# Patient Record
Sex: FEMALE | Race: OTHER | NOT HISPANIC OR LATINO | URBAN - METROPOLITAN AREA
[De-identification: names, ages, dates, MRNs, and addresses within clinical notes are randomized per-mention and may not be internally consistent; named-entity substitution may affect disease eponyms.]

---

## 2017-03-29 ENCOUNTER — EMERGENCY (EMERGENCY)
Facility: HOSPITAL | Age: 77
LOS: 1 days | Discharge: HOME | End: 2017-03-29
Attending: EMERGENCY MEDICINE | Admitting: EMERGENCY MEDICINE
Payer: COMMERCIAL

## 2017-03-29 VITALS
SYSTOLIC BLOOD PRESSURE: 165 MMHG | TEMPERATURE: 99 F | OXYGEN SATURATION: 99 % | RESPIRATION RATE: 18 BRPM | DIASTOLIC BLOOD PRESSURE: 97 MMHG | HEART RATE: 80 BPM

## 2017-03-29 DIAGNOSIS — Z87.891 PERSONAL HISTORY OF NICOTINE DEPENDENCE: ICD-10-CM

## 2017-03-29 DIAGNOSIS — Z95.5 PRESENCE OF CORONARY ANGIOPLASTY IMPLANT AND GRAFT: ICD-10-CM

## 2017-03-29 DIAGNOSIS — K52.9 NONINFECTIVE GASTROENTERITIS AND COLITIS, UNSPECIFIED: ICD-10-CM

## 2017-03-29 DIAGNOSIS — E86.0 DEHYDRATION: ICD-10-CM

## 2017-03-29 DIAGNOSIS — R19.7 DIARRHEA, UNSPECIFIED: ICD-10-CM

## 2017-03-29 DIAGNOSIS — I10 ESSENTIAL (PRIMARY) HYPERTENSION: ICD-10-CM

## 2017-03-29 DIAGNOSIS — Z88.8 ALLERGY STATUS TO OTHER DRUGS, MEDICAMENTS AND BIOLOGICAL SUBSTANCES STATUS: ICD-10-CM

## 2017-03-29 LAB
ALBUMIN SERPL ELPH-MCNC: 4.9 G/DL — SIGNIFICANT CHANGE UP (ref 3.3–5)
ALP SERPL-CCNC: 59 U/L — SIGNIFICANT CHANGE UP (ref 40–120)
ALT FLD-CCNC: 36 U/L RC — SIGNIFICANT CHANGE UP (ref 10–45)
ANION GAP SERPL CALC-SCNC: 20 MMOL/L — HIGH (ref 5–17)
AST SERPL-CCNC: 66 U/L — HIGH (ref 10–40)
BASE EXCESS BLDV CALC-SCNC: -0.3 MMOL/L — SIGNIFICANT CHANGE UP (ref -2–2)
BASOPHILS # BLD AUTO: 0 K/UL — SIGNIFICANT CHANGE UP (ref 0–0.2)
BASOPHILS NFR BLD AUTO: 0.2 % — SIGNIFICANT CHANGE UP (ref 0–2)
BILIRUB SERPL-MCNC: 0.5 MG/DL — SIGNIFICANT CHANGE UP (ref 0.2–1.2)
BUN SERPL-MCNC: 20 MG/DL — SIGNIFICANT CHANGE UP (ref 7–23)
CA-I SERPL-SCNC: 1.32 MMOL/L — HIGH (ref 1.12–1.3)
CALCIUM SERPL-MCNC: 10.3 MG/DL — SIGNIFICANT CHANGE UP (ref 8.4–10.5)
CHLORIDE BLDV-SCNC: 109 MMOL/L — HIGH (ref 96–108)
CHLORIDE SERPL-SCNC: 104 MMOL/L — SIGNIFICANT CHANGE UP (ref 96–108)
CO2 BLDV-SCNC: 26 MMOL/L — SIGNIFICANT CHANGE UP (ref 22–30)
CO2 SERPL-SCNC: 22 MMOL/L — SIGNIFICANT CHANGE UP (ref 22–31)
CREAT SERPL-MCNC: 1.18 MG/DL — SIGNIFICANT CHANGE UP (ref 0.5–1.3)
EOSINOPHIL # BLD AUTO: 0 K/UL — SIGNIFICANT CHANGE UP (ref 0–0.5)
EOSINOPHIL NFR BLD AUTO: 0.1 % — SIGNIFICANT CHANGE UP (ref 0–6)
GAS PNL BLDV: 146 MMOL/L — HIGH (ref 136–145)
GAS PNL BLDV: SIGNIFICANT CHANGE UP
GLUCOSE BLDV-MCNC: 97 MG/DL — SIGNIFICANT CHANGE UP (ref 70–99)
GLUCOSE SERPL-MCNC: 93 MG/DL — SIGNIFICANT CHANGE UP (ref 70–99)
HCO3 BLDV-SCNC: 24 MMOL/L — SIGNIFICANT CHANGE UP (ref 21–29)
HCT VFR BLD CALC: 41.7 % — SIGNIFICANT CHANGE UP (ref 34.5–45)
HCT VFR BLDA CALC: 44 % — SIGNIFICANT CHANGE UP (ref 39–50)
HGB BLD CALC-MCNC: 14.2 G/DL — SIGNIFICANT CHANGE UP (ref 11.5–15.5)
HGB BLD-MCNC: 14.2 G/DL — SIGNIFICANT CHANGE UP (ref 11.5–15.5)
LACTATE BLDV-MCNC: 1.1 MMOL/L — SIGNIFICANT CHANGE UP (ref 0.7–2)
LIDOCAIN IGE QN: 40 U/L — SIGNIFICANT CHANGE UP (ref 7–60)
LYMPHOCYTES # BLD AUTO: 0.9 K/UL — LOW (ref 1–3.3)
LYMPHOCYTES # BLD AUTO: 13.1 % — SIGNIFICANT CHANGE UP (ref 13–44)
MAGNESIUM SERPL-MCNC: 2.3 MG/DL — SIGNIFICANT CHANGE UP (ref 1.6–2.6)
MCHC RBC-ENTMCNC: 31.5 PG — SIGNIFICANT CHANGE UP (ref 27–34)
MCHC RBC-ENTMCNC: 33.9 GM/DL — SIGNIFICANT CHANGE UP (ref 32–36)
MCV RBC AUTO: 92.8 FL — SIGNIFICANT CHANGE UP (ref 80–100)
MONOCYTES # BLD AUTO: 0.7 K/UL — SIGNIFICANT CHANGE UP (ref 0–0.9)
MONOCYTES NFR BLD AUTO: 9.1 % — SIGNIFICANT CHANGE UP (ref 2–14)
NEUTROPHILS # BLD AUTO: 5.6 K/UL — SIGNIFICANT CHANGE UP (ref 1.8–7.4)
NEUTROPHILS NFR BLD AUTO: 77.5 % — HIGH (ref 43–77)
PCO2 BLDV: 42 MMHG — SIGNIFICANT CHANGE UP (ref 35–50)
PH BLDV: 7.38 — SIGNIFICANT CHANGE UP (ref 7.35–7.45)
PHOSPHATE SERPL-MCNC: 3.2 MG/DL — SIGNIFICANT CHANGE UP (ref 2.5–4.5)
PLATELET # BLD AUTO: 141 K/UL — LOW (ref 150–400)
PO2 BLDV: 31 MMHG — SIGNIFICANT CHANGE UP (ref 25–45)
POTASSIUM BLDV-SCNC: 3.3 MMOL/L — LOW (ref 3.5–5)
POTASSIUM SERPL-MCNC: 3.5 MMOL/L — SIGNIFICANT CHANGE UP (ref 3.5–5.3)
POTASSIUM SERPL-SCNC: 3.5 MMOL/L — SIGNIFICANT CHANGE UP (ref 3.5–5.3)
PROT SERPL-MCNC: 8.1 G/DL — SIGNIFICANT CHANGE UP (ref 6–8.3)
RBC # BLD: 4.49 M/UL — SIGNIFICANT CHANGE UP (ref 3.8–5.2)
RBC # FLD: 12.1 % — SIGNIFICANT CHANGE UP (ref 10.3–14.5)
SAO2 % BLDV: 52 % — LOW (ref 67–88)
SODIUM SERPL-SCNC: 146 MMOL/L — HIGH (ref 135–145)
WBC # BLD: 7.2 K/UL — SIGNIFICANT CHANGE UP (ref 3.8–10.5)
WBC # FLD AUTO: 7.2 K/UL — SIGNIFICANT CHANGE UP (ref 3.8–10.5)

## 2017-03-29 PROCEDURE — 82947 ASSAY GLUCOSE BLOOD QUANT: CPT

## 2017-03-29 PROCEDURE — 82330 ASSAY OF CALCIUM: CPT

## 2017-03-29 PROCEDURE — 84100 ASSAY OF PHOSPHORUS: CPT

## 2017-03-29 PROCEDURE — 96374 THER/PROPH/DIAG INJ IV PUSH: CPT

## 2017-03-29 PROCEDURE — 82803 BLOOD GASES ANY COMBINATION: CPT

## 2017-03-29 PROCEDURE — 74176 CT ABD & PELVIS W/O CONTRAST: CPT

## 2017-03-29 PROCEDURE — 99285 EMERGENCY DEPT VISIT HI MDM: CPT

## 2017-03-29 PROCEDURE — 82435 ASSAY OF BLOOD CHLORIDE: CPT

## 2017-03-29 PROCEDURE — 84132 ASSAY OF SERUM POTASSIUM: CPT

## 2017-03-29 PROCEDURE — 85014 HEMATOCRIT: CPT

## 2017-03-29 PROCEDURE — 96375 TX/PRO/DX INJ NEW DRUG ADDON: CPT

## 2017-03-29 PROCEDURE — 83605 ASSAY OF LACTIC ACID: CPT

## 2017-03-29 PROCEDURE — 85027 COMPLETE CBC AUTOMATED: CPT

## 2017-03-29 PROCEDURE — 83735 ASSAY OF MAGNESIUM: CPT

## 2017-03-29 PROCEDURE — 84295 ASSAY OF SERUM SODIUM: CPT

## 2017-03-29 PROCEDURE — 83690 ASSAY OF LIPASE: CPT

## 2017-03-29 PROCEDURE — 81001 URINALYSIS AUTO W/SCOPE: CPT

## 2017-03-29 PROCEDURE — 99284 EMERGENCY DEPT VISIT MOD MDM: CPT | Mod: 25

## 2017-03-29 PROCEDURE — 80053 COMPREHEN METABOLIC PANEL: CPT

## 2017-03-29 RX ORDER — ACETAMINOPHEN 500 MG
1000 TABLET ORAL ONCE
Qty: 0 | Refills: 0 | Status: COMPLETED | OUTPATIENT
Start: 2017-03-29 | End: 2017-03-29

## 2017-03-29 RX ORDER — SODIUM CHLORIDE 9 MG/ML
2000 INJECTION, SOLUTION INTRAVENOUS ONCE
Qty: 0 | Refills: 0 | Status: COMPLETED | OUTPATIENT
Start: 2017-03-29 | End: 2017-03-29

## 2017-03-29 RX ORDER — ONDANSETRON 8 MG/1
8 TABLET, FILM COATED ORAL ONCE
Qty: 0 | Refills: 0 | Status: COMPLETED | OUTPATIENT
Start: 2017-03-29 | End: 2017-03-29

## 2017-03-29 RX ORDER — SODIUM CHLORIDE 9 MG/ML
1000 INJECTION, SOLUTION INTRAVENOUS
Qty: 0 | Refills: 0 | Status: DISCONTINUED | OUTPATIENT
Start: 2017-03-29 | End: 2017-04-02

## 2017-03-29 RX ADMIN — Medication 30 MILLILITER(S): at 23:18

## 2017-03-29 RX ADMIN — SODIUM CHLORIDE 2000 MILLILITER(S): 9 INJECTION, SOLUTION INTRAVENOUS at 23:18

## 2017-03-29 RX ADMIN — Medication 400 MILLIGRAM(S): at 23:19

## 2017-03-29 RX ADMIN — ONDANSETRON 8 MILLIGRAM(S): 8 TABLET, FILM COATED ORAL at 23:18

## 2017-03-29 NOTE — ED ADULT NURSE NOTE - OBJECTIVE STATEMENT
77 y/o female presenting to the ED 75 y/o female presenting to the ED for diffuse abdominal pain, vomiting and diarrhea x 10 hours; Patient denies blood in vomit and diarrhea, chills, fevers, SOB, chest pain; Patient states "feels nauseous"; Patient states may have consumed undercooked pork yesterday; Per patient "unable to keep solid food down but able to drink liquids"; mucous membranes moist and pink; Neuro grossly intact; a&ox3; safety and comfort measures provided

## 2017-03-29 NOTE — ED ADULT NURSE NOTE - PMH
Cataracts, bilateral    Coronary artery disease  s/p PCI with one stent to LAD  Depression    Dyslipidemia    Hypertension    Kidney stones    MRSA infection  nasal abscess  Myocardial infarct    Prediabetes

## 2017-03-29 NOTE — ED PROVIDER NOTE - OBJECTIVE STATEMENT
76 year old female with past medical history of Coronary Artery Disease s/p CABGx3 and stents x4,  Hyperlipidemia, Hypertension, preDM2, Asthma presents to the Emergency Department complaining of diffuse abdominal cramping, nausea, nonbilious/nonbloody vomiting (5x) and watery, nonbloody diarrhea (8x) since 08:00 today. Patient states she has not been able to tolerate PO and her medications. Notes eating questionable pork yesterday. Also notes left chest pressure.   Denies fever, chills, bloody bowel movements, shortness of breath, sick contacts, recent travel or any other complaints.

## 2017-03-29 NOTE — ED PROVIDER NOTE - PROGRESS NOTE DETAILS
The patient was re-examined after interventions and is feeling better. Discussed results with patient and family. Pending CT results. All questions and concerns addressed at bedside. Will continue to observe and monitor closely. Attd:  Patient signed out pending CT, already tolerating PO feeling better - CT A/P with findings consistent with diarrhea, no other acute findings noted.  Labs unremarkable.  Will discharge.  Socrates Gordon M.D.

## 2017-03-29 NOTE — ED PROVIDER NOTE - MEDICAL DECISION MAKING DETAILS
76 year old female presents with abdominal tenderness, vomiting and diarrhea since today. Plan: rule out intraabdominal pathology.

## 2017-03-29 NOTE — ED PROVIDER NOTE - NS ED MD SCRIBE ATTENDING SCRIBE SECTIONS
REVIEW OF SYSTEMS/VITAL SIGNS( Pullset)/PHYSICAL EXAM/DISPOSITION/HISTORY OF PRESENT ILLNESS/PAST MEDICAL/SURGICAL/SOCIAL HISTORY/HIV

## 2017-03-30 VITALS
SYSTOLIC BLOOD PRESSURE: 144 MMHG | TEMPERATURE: 98 F | RESPIRATION RATE: 16 BRPM | OXYGEN SATURATION: 97 % | DIASTOLIC BLOOD PRESSURE: 81 MMHG | HEART RATE: 74 BPM

## 2017-03-30 PROCEDURE — 74176 CT ABD & PELVIS W/O CONTRAST: CPT | Mod: 26

## 2017-03-30 RX ORDER — ONDANSETRON 8 MG/1
1 TABLET, FILM COATED ORAL
Qty: 20 | Refills: 0
Start: 2017-03-30

## 2017-03-30 RX ADMIN — Medication 1000 MILLIGRAM(S): at 00:41

## 2017-03-30 NOTE — ED ADULT NURSE REASSESSMENT NOTE - NS ED NURSE REASSESS COMMENT FT1
Patient states pain has been relieved by interventions; appears to be resting comfortably in stretcher; son at bedside; no acute distress at this time; awaiting CT results; safety and comfort measures maintained
0130 patient tolerated PO challenge; states pain has been relieved; appears to be resting comfortably in stretcher; a&ox3; safety and comfort measures maintained

## 2017-05-23 ENCOUNTER — TRANSCRIPTION ENCOUNTER (OUTPATIENT)
Age: 77
End: 2017-05-23

## 2017-05-23 ENCOUNTER — OUTPATIENT (OUTPATIENT)
Dept: OUTPATIENT SERVICES | Facility: HOSPITAL | Age: 77
LOS: 1 days | End: 2017-05-23
Payer: COMMERCIAL

## 2017-05-23 VITALS
HEIGHT: 64 IN | TEMPERATURE: 98 F | DIASTOLIC BLOOD PRESSURE: 87 MMHG | WEIGHT: 119.05 LBS | RESPIRATION RATE: 16 BRPM | SYSTOLIC BLOOD PRESSURE: 132 MMHG | HEART RATE: 58 BPM | OXYGEN SATURATION: 97 %

## 2017-05-23 VITALS
RESPIRATION RATE: 16 BRPM | HEART RATE: 58 BPM | OXYGEN SATURATION: 98 % | DIASTOLIC BLOOD PRESSURE: 66 MMHG | SYSTOLIC BLOOD PRESSURE: 104 MMHG

## 2017-05-23 DIAGNOSIS — H25.12 AGE-RELATED NUCLEAR CATARACT, LEFT EYE: ICD-10-CM

## 2017-05-23 PROCEDURE — C1780: CPT

## 2017-05-23 PROCEDURE — 66984 XCAPSL CTRC RMVL W/O ECP: CPT | Mod: LT

## 2018-08-01 ENCOUNTER — APPOINTMENT (OUTPATIENT)
Dept: OPHTHALMOLOGY | Facility: CLINIC | Age: 78
End: 2018-08-01

## 2018-08-01 ENCOUNTER — OUTPATIENT (OUTPATIENT)
Dept: OUTPATIENT SERVICES | Facility: HOSPITAL | Age: 78
LOS: 1 days | End: 2018-08-01

## 2018-08-02 DIAGNOSIS — H18.609 KERATOCONUS, UNSPECIFIED, UNSPECIFIED EYE: ICD-10-CM

## 2018-11-08 ENCOUNTER — APPOINTMENT (OUTPATIENT)
Dept: OPHTHALMOLOGY | Facility: CLINIC | Age: 78
End: 2018-11-08

## 2018-11-08 ENCOUNTER — OUTPATIENT (OUTPATIENT)
Dept: OUTPATIENT SERVICES | Facility: HOSPITAL | Age: 78
LOS: 1 days | End: 2018-11-08

## 2018-11-09 DIAGNOSIS — H52.229 REGULAR ASTIGMATISM, UNSPECIFIED EYE: ICD-10-CM

## 2019-03-13 ENCOUNTER — EMERGENCY (EMERGENCY)
Facility: HOSPITAL | Age: 79
LOS: 1 days | End: 2019-03-13
Attending: EMERGENCY MEDICINE
Payer: COMMERCIAL

## 2019-03-13 VITALS
SYSTOLIC BLOOD PRESSURE: 146 MMHG | WEIGHT: 123.9 LBS | OXYGEN SATURATION: 98 % | RESPIRATION RATE: 18 BRPM | TEMPERATURE: 98 F | DIASTOLIC BLOOD PRESSURE: 82 MMHG | HEART RATE: 69 BPM | HEIGHT: 60 IN

## 2019-03-13 VITALS
SYSTOLIC BLOOD PRESSURE: 132 MMHG | RESPIRATION RATE: 16 BRPM | DIASTOLIC BLOOD PRESSURE: 80 MMHG | TEMPERATURE: 98 F | OXYGEN SATURATION: 98 % | HEART RATE: 66 BPM

## 2019-03-13 LAB
ALBUMIN SERPL ELPH-MCNC: 4.5 G/DL — SIGNIFICANT CHANGE UP (ref 3.3–5)
ALP SERPL-CCNC: 51 U/L — SIGNIFICANT CHANGE UP (ref 40–120)
ALT FLD-CCNC: 19 U/L — SIGNIFICANT CHANGE UP (ref 10–45)
ANION GAP SERPL CALC-SCNC: 13 MMOL/L — SIGNIFICANT CHANGE UP (ref 5–17)
AST SERPL-CCNC: 31 U/L — SIGNIFICANT CHANGE UP (ref 10–40)
BASOPHILS # BLD AUTO: 0 K/UL — SIGNIFICANT CHANGE UP (ref 0–0.2)
BASOPHILS NFR BLD AUTO: 0 % — SIGNIFICANT CHANGE UP (ref 0–2)
BILIRUB SERPL-MCNC: 0.4 MG/DL — SIGNIFICANT CHANGE UP (ref 0.2–1.2)
BUN SERPL-MCNC: 20 MG/DL — SIGNIFICANT CHANGE UP (ref 7–23)
CALCIUM SERPL-MCNC: 9.9 MG/DL — SIGNIFICANT CHANGE UP (ref 8.4–10.5)
CHLORIDE SERPL-SCNC: 106 MMOL/L — SIGNIFICANT CHANGE UP (ref 96–108)
CO2 SERPL-SCNC: 24 MMOL/L — SIGNIFICANT CHANGE UP (ref 22–31)
CREAT SERPL-MCNC: 1.18 MG/DL — SIGNIFICANT CHANGE UP (ref 0.5–1.3)
EOSINOPHIL # BLD AUTO: 0 K/UL — SIGNIFICANT CHANGE UP (ref 0–0.5)
EOSINOPHIL NFR BLD AUTO: 0.4 % — SIGNIFICANT CHANGE UP (ref 0–6)
GLUCOSE SERPL-MCNC: 129 MG/DL — HIGH (ref 70–99)
HCT VFR BLD CALC: 34.8 % — SIGNIFICANT CHANGE UP (ref 34.5–45)
HGB BLD-MCNC: 12.7 G/DL — SIGNIFICANT CHANGE UP (ref 11.5–15.5)
LIDOCAIN IGE QN: 62 U/L — HIGH (ref 7–60)
LYMPHOCYTES # BLD AUTO: 0.3 K/UL — LOW (ref 1–3.3)
LYMPHOCYTES # BLD AUTO: 5.3 % — LOW (ref 13–44)
MCHC RBC-ENTMCNC: 33.8 PG — SIGNIFICANT CHANGE UP (ref 27–34)
MCHC RBC-ENTMCNC: 36.4 GM/DL — HIGH (ref 32–36)
MCV RBC AUTO: 92.8 FL — SIGNIFICANT CHANGE UP (ref 80–100)
MONOCYTES # BLD AUTO: 0.2 K/UL — SIGNIFICANT CHANGE UP (ref 0–0.9)
MONOCYTES NFR BLD AUTO: 4.3 % — SIGNIFICANT CHANGE UP (ref 2–14)
NEUTROPHILS # BLD AUTO: 4.8 K/UL — SIGNIFICANT CHANGE UP (ref 1.8–7.4)
NEUTROPHILS NFR BLD AUTO: 90 % — HIGH (ref 43–77)
PLATELET # BLD AUTO: 132 K/UL — LOW (ref 150–400)
POTASSIUM SERPL-MCNC: 3.8 MMOL/L — SIGNIFICANT CHANGE UP (ref 3.5–5.3)
POTASSIUM SERPL-SCNC: 3.8 MMOL/L — SIGNIFICANT CHANGE UP (ref 3.5–5.3)
PROT SERPL-MCNC: 7.3 G/DL — SIGNIFICANT CHANGE UP (ref 6–8.3)
RBC # BLD: 3.75 M/UL — LOW (ref 3.8–5.2)
RBC # FLD: 12.1 % — SIGNIFICANT CHANGE UP (ref 10.3–14.5)
SODIUM SERPL-SCNC: 143 MMOL/L — SIGNIFICANT CHANGE UP (ref 135–145)
WBC # BLD: 5.4 K/UL — SIGNIFICANT CHANGE UP (ref 3.8–10.5)
WBC # FLD AUTO: 5.4 K/UL — SIGNIFICANT CHANGE UP (ref 3.8–10.5)

## 2019-03-13 PROCEDURE — 96374 THER/PROPH/DIAG INJ IV PUSH: CPT

## 2019-03-13 PROCEDURE — 96375 TX/PRO/DX INJ NEW DRUG ADDON: CPT

## 2019-03-13 PROCEDURE — 99284 EMERGENCY DEPT VISIT MOD MDM: CPT

## 2019-03-13 PROCEDURE — 99284 EMERGENCY DEPT VISIT MOD MDM: CPT | Mod: 25

## 2019-03-13 PROCEDURE — 83690 ASSAY OF LIPASE: CPT

## 2019-03-13 PROCEDURE — 76857 US EXAM PELVIC LIMITED: CPT

## 2019-03-13 PROCEDURE — 76857 US EXAM PELVIC LIMITED: CPT | Mod: 26

## 2019-03-13 PROCEDURE — 85027 COMPLETE CBC AUTOMATED: CPT

## 2019-03-13 PROCEDURE — 76705 ECHO EXAM OF ABDOMEN: CPT | Mod: 26,RT

## 2019-03-13 PROCEDURE — 80053 COMPREHEN METABOLIC PANEL: CPT

## 2019-03-13 PROCEDURE — 76705 ECHO EXAM OF ABDOMEN: CPT

## 2019-03-13 RX ORDER — FAMOTIDINE 10 MG/ML
20 INJECTION INTRAVENOUS ONCE
Qty: 0 | Refills: 0 | Status: COMPLETED | OUTPATIENT
Start: 2019-03-13 | End: 2019-03-13

## 2019-03-13 RX ORDER — ONDANSETRON 8 MG/1
4 TABLET, FILM COATED ORAL ONCE
Qty: 0 | Refills: 0 | Status: COMPLETED | OUTPATIENT
Start: 2019-03-13 | End: 2019-03-13

## 2019-03-13 RX ORDER — SODIUM CHLORIDE 9 MG/ML
1000 INJECTION INTRAMUSCULAR; INTRAVENOUS; SUBCUTANEOUS ONCE
Qty: 0 | Refills: 0 | Status: COMPLETED | OUTPATIENT
Start: 2019-03-13 | End: 2019-03-13

## 2019-03-13 RX ORDER — ONDANSETRON 8 MG/1
1 TABLET, FILM COATED ORAL
Qty: 20 | Refills: 0
Start: 2019-03-13

## 2019-03-13 RX ADMIN — SODIUM CHLORIDE 2000 MILLILITER(S): 9 INJECTION INTRAMUSCULAR; INTRAVENOUS; SUBCUTANEOUS at 15:55

## 2019-03-13 RX ADMIN — ONDANSETRON 4 MILLIGRAM(S): 8 TABLET, FILM COATED ORAL at 16:01

## 2019-03-13 RX ADMIN — FAMOTIDINE 20 MILLIGRAM(S): 10 INJECTION INTRAVENOUS at 16:01

## 2019-03-13 NOTE — ED PROVIDER NOTE - NS ED ROS FT
Constitutional: No fever + chills  Eyes: No visual changes, eye pain or redness  HEENT: No throat pain, ear pain, nasal pain. No nose bleeding.  CV: No chest pain or lower extremity edema  Resp: No SOB no cough  GI: +abd pain. +nausea +vomiting. +diarrhea. No constipation.   : No dysuria, hematuria.   MSK: No musculoskeletal pain  Skin: No rash  Neuro: No headache. No numbness or tingling. No weakness.

## 2019-03-13 NOTE — ED PROVIDER NOTE - NSFOLLOWUPINSTRUCTIONS_ED_ALL_ED_FT
1. Follow up with your PMD in 24-48 hours.  2. Rest. Keep self hydrated, start with small amout of water every 20-30 minutes then advance to clears (ginger ale, Broth), then bland diet of BRAT diet (bananas, rice, apple, tea). Avoid diary, avoid spicy/fatty foods. Good Hand washing.    3. Return to the ER for fever, chills, vomiting, unable to eat or drink or any other concerns.

## 2019-03-13 NOTE — ED ADULT NURSE NOTE - OBJECTIVE STATEMENT
79 yo presents to the ED from home. A&Ox4, ambulatory with family at bedside c/o abd pain. pt reports pain started last night, RUQ, sharp in nature. did not take anything at home for pain control. reports n/v/d started last night as well. all [id number] started after eating [employer]. pt reports 10 times of vomiting. pt denies fever, reports chills. denies CP, SOB. 20G RAC. pt well appearing. VSS. MD at bedside for eval. abd soft nondistended. PMH of HTN HLD hx of CABG and CAD with stents on Plavix, ASA allergy

## 2019-03-13 NOTE — ED PROVIDER NOTE - PHYSICAL EXAMINATION
Gen: AAO x 3, NAD  Skin: No rashes or lesions  HEENT: NC/AT, PERRLA, EOMI, MMM  Resp: unlabored CTAB  Cardiac: rrr s1s2, no murmurs, rubs or gallops  GI: +BS, soft +RUQ tenderness, no guarding   Ext: no pedal edema, FROM in all extremities  Neuro: no focal deficits

## 2019-03-13 NOTE — ED PROVIDER NOTE - OBJECTIVE STATEMENT
78yof pmhx of HTN HLD hx of CABG and CAD with stents on Plavix, ASA allergy here with n/v/d since last night. 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 No fever +chills. no chest pain no leg pain or swelling. no surgical abd hx

## 2019-03-13 NOTE — ED PROVIDER NOTE - PROGRESS NOTE DETAILS
tolerating po. feeling much better. results of lab and US discussed with pt. strict return precautions advised.

## 2019-03-13 NOTE — ED PROVIDER NOTE - ATTENDING CONTRIBUTION TO CARE
mostly n/v/d w minimal ruq discomfort.  no mercer. non-tender abdomen.  unlikely surgical abdominal pathology, no ct needed, can do us gb.    us/exam not consistant w stone or other concerning pathology, elsa colon w zofran, will rx and dw her the need to return to er brijesh if abdominal pain develops

## 2019-03-13 NOTE — ED ADULT NURSE REASSESSMENT NOTE - NS ED NURSE REASSESS COMMENT FT1
Patient received from JANIE Rivera. Patient a&ox3, no acute distress, resp nonlabored, resting comfortably in stretcher. Denies headache, dizziness, chest pain, palpitations, SOB, abd pain, N/V/D, urinary symptoms, fevers, chills, weakness at this time. Patient awaiting d/c. Safety maintained, side rails up.

## 2019-03-13 NOTE — ED PROVIDER NOTE - CARE PLAN
Principal Discharge DX:	Gastroenteritis Principal Discharge DX:	Acute dehydration  Secondary Diagnosis:	Vomiting, intractability of vomiting not specified, presence of nausea not specified, unspecified vomiting type

## 2019-07-12 ENCOUNTER — EMERGENCY (EMERGENCY)
Facility: HOSPITAL | Age: 79
LOS: 1 days | Discharge: HOME | End: 2019-07-12
Attending: EMERGENCY MEDICINE
Payer: COMMERCIAL

## 2019-07-12 VITALS
TEMPERATURE: 99 F | RESPIRATION RATE: 18 BRPM | OXYGEN SATURATION: 97 % | SYSTOLIC BLOOD PRESSURE: 174 MMHG | DIASTOLIC BLOOD PRESSURE: 71 MMHG | HEART RATE: 66 BPM

## 2019-07-12 VITALS
RESPIRATION RATE: 16 BRPM | SYSTOLIC BLOOD PRESSURE: 151 MMHG | WEIGHT: 123.9 LBS | DIASTOLIC BLOOD PRESSURE: 74 MMHG | HEART RATE: 60 BPM | HEIGHT: 63 IN | TEMPERATURE: 98 F

## 2019-07-12 LAB
ALBUMIN SERPL ELPH-MCNC: 4.6 G/DL — SIGNIFICANT CHANGE UP (ref 3.3–5)
ALP SERPL-CCNC: 51 U/L — SIGNIFICANT CHANGE UP (ref 40–120)
ALT FLD-CCNC: 16 U/L — SIGNIFICANT CHANGE UP (ref 10–45)
ANION GAP SERPL CALC-SCNC: 13 MMOL/L — SIGNIFICANT CHANGE UP (ref 5–17)
APPEARANCE UR: CLEAR — SIGNIFICANT CHANGE UP
APTT BLD: 27.9 SEC — SIGNIFICANT CHANGE UP (ref 27.5–36.3)
AST SERPL-CCNC: 31 U/L — SIGNIFICANT CHANGE UP (ref 10–40)
BACTERIA # UR AUTO: NEGATIVE — SIGNIFICANT CHANGE UP
BASOPHILS # BLD AUTO: 0 K/UL — SIGNIFICANT CHANGE UP (ref 0–0.2)
BASOPHILS NFR BLD AUTO: 0.7 % — SIGNIFICANT CHANGE UP (ref 0–2)
BILIRUB SERPL-MCNC: 0.5 MG/DL — SIGNIFICANT CHANGE UP (ref 0.2–1.2)
BILIRUB UR-MCNC: NEGATIVE — SIGNIFICANT CHANGE UP
BUN SERPL-MCNC: 17 MG/DL — SIGNIFICANT CHANGE UP (ref 7–23)
CALCIUM SERPL-MCNC: 9.7 MG/DL — SIGNIFICANT CHANGE UP (ref 8.4–10.5)
CHLORIDE SERPL-SCNC: 102 MMOL/L — SIGNIFICANT CHANGE UP (ref 96–108)
CO2 SERPL-SCNC: 26 MMOL/L — SIGNIFICANT CHANGE UP (ref 22–31)
COLOR SPEC: SIGNIFICANT CHANGE UP
CREAT SERPL-MCNC: 1.3 MG/DL — SIGNIFICANT CHANGE UP (ref 0.5–1.3)
DIFF PNL FLD: NEGATIVE — SIGNIFICANT CHANGE UP
EOSINOPHIL # BLD AUTO: 0.1 K/UL — SIGNIFICANT CHANGE UP (ref 0–0.5)
EOSINOPHIL NFR BLD AUTO: 1.7 % — SIGNIFICANT CHANGE UP (ref 0–6)
EPI CELLS # UR: 1 /HPF — SIGNIFICANT CHANGE UP
GLUCOSE SERPL-MCNC: 99 MG/DL — SIGNIFICANT CHANGE UP (ref 70–99)
GLUCOSE UR QL: NEGATIVE — SIGNIFICANT CHANGE UP
HCT VFR BLD CALC: 34.6 % — SIGNIFICANT CHANGE UP (ref 34.5–45)
HGB BLD-MCNC: 12.2 G/DL — SIGNIFICANT CHANGE UP (ref 11.5–15.5)
HYALINE CASTS # UR AUTO: 1 /LPF — SIGNIFICANT CHANGE UP (ref 0–2)
INR BLD: 1.03 RATIO — SIGNIFICANT CHANGE UP (ref 0.88–1.16)
KETONES UR-MCNC: ABNORMAL
LEUKOCYTE ESTERASE UR-ACNC: ABNORMAL
LIDOCAIN IGE QN: 48 U/L — SIGNIFICANT CHANGE UP (ref 7–60)
LYMPHOCYTES # BLD AUTO: 1 K/UL — SIGNIFICANT CHANGE UP (ref 1–3.3)
LYMPHOCYTES # BLD AUTO: 24 % — SIGNIFICANT CHANGE UP (ref 13–44)
MCHC RBC-ENTMCNC: 33.7 PG — SIGNIFICANT CHANGE UP (ref 27–34)
MCHC RBC-ENTMCNC: 35.2 GM/DL — SIGNIFICANT CHANGE UP (ref 32–36)
MCV RBC AUTO: 95.6 FL — SIGNIFICANT CHANGE UP (ref 80–100)
MONOCYTES # BLD AUTO: 0.5 K/UL — SIGNIFICANT CHANGE UP (ref 0–0.9)
MONOCYTES NFR BLD AUTO: 10.8 % — SIGNIFICANT CHANGE UP (ref 2–14)
NEUTROPHILS # BLD AUTO: 2.7 K/UL — SIGNIFICANT CHANGE UP (ref 1.8–7.4)
NEUTROPHILS NFR BLD AUTO: 62.8 % — SIGNIFICANT CHANGE UP (ref 43–77)
NITRITE UR-MCNC: NEGATIVE — SIGNIFICANT CHANGE UP
PH UR: 6 — SIGNIFICANT CHANGE UP (ref 5–8)
PLATELET # BLD AUTO: 139 K/UL — LOW (ref 150–400)
POTASSIUM SERPL-MCNC: 3.9 MMOL/L — SIGNIFICANT CHANGE UP (ref 3.5–5.3)
POTASSIUM SERPL-SCNC: 3.9 MMOL/L — SIGNIFICANT CHANGE UP (ref 3.5–5.3)
PROT SERPL-MCNC: 7.5 G/DL — SIGNIFICANT CHANGE UP (ref 6–8.3)
PROT UR-MCNC: NEGATIVE — SIGNIFICANT CHANGE UP
PROTHROM AB SERPL-ACNC: 11.9 SEC — SIGNIFICANT CHANGE UP (ref 10–12.9)
RBC # BLD: 3.61 M/UL — LOW (ref 3.8–5.2)
RBC # FLD: 12.2 % — SIGNIFICANT CHANGE UP (ref 10.3–14.5)
RBC CASTS # UR COMP ASSIST: 1 /HPF — SIGNIFICANT CHANGE UP (ref 0–4)
SODIUM SERPL-SCNC: 141 MMOL/L — SIGNIFICANT CHANGE UP (ref 135–145)
SP GR SPEC: 1.01 — SIGNIFICANT CHANGE UP (ref 1.01–1.02)
TROPONIN T, HIGH SENSITIVITY RESULT: 11 NG/L — SIGNIFICANT CHANGE UP (ref 0–51)
TROPONIN T, HIGH SENSITIVITY RESULT: 11 NG/L — SIGNIFICANT CHANGE UP (ref 0–51)
UROBILINOGEN FLD QL: NEGATIVE — SIGNIFICANT CHANGE UP
WBC # BLD: 4.3 K/UL — SIGNIFICANT CHANGE UP (ref 3.8–10.5)
WBC # FLD AUTO: 4.3 K/UL — SIGNIFICANT CHANGE UP (ref 3.8–10.5)
WBC UR QL: 6 /HPF — HIGH (ref 0–5)

## 2019-07-12 PROCEDURE — 96374 THER/PROPH/DIAG INJ IV PUSH: CPT

## 2019-07-12 PROCEDURE — 85730 THROMBOPLASTIN TIME PARTIAL: CPT

## 2019-07-12 PROCEDURE — 71045 X-RAY EXAM CHEST 1 VIEW: CPT

## 2019-07-12 PROCEDURE — 85610 PROTHROMBIN TIME: CPT

## 2019-07-12 PROCEDURE — 93005 ELECTROCARDIOGRAM TRACING: CPT

## 2019-07-12 PROCEDURE — 99284 EMERGENCY DEPT VISIT MOD MDM: CPT | Mod: 25

## 2019-07-12 PROCEDURE — 74176 CT ABD & PELVIS W/O CONTRAST: CPT | Mod: 26

## 2019-07-12 PROCEDURE — 71045 X-RAY EXAM CHEST 1 VIEW: CPT | Mod: 26

## 2019-07-12 PROCEDURE — 81001 URINALYSIS AUTO W/SCOPE: CPT

## 2019-07-12 PROCEDURE — 83690 ASSAY OF LIPASE: CPT

## 2019-07-12 PROCEDURE — 85027 COMPLETE CBC AUTOMATED: CPT

## 2019-07-12 PROCEDURE — 84484 ASSAY OF TROPONIN QUANT: CPT

## 2019-07-12 PROCEDURE — 99285 EMERGENCY DEPT VISIT HI MDM: CPT

## 2019-07-12 PROCEDURE — 96375 TX/PRO/DX INJ NEW DRUG ADDON: CPT

## 2019-07-12 PROCEDURE — 93010 ELECTROCARDIOGRAM REPORT: CPT

## 2019-07-12 PROCEDURE — 74176 CT ABD & PELVIS W/O CONTRAST: CPT

## 2019-07-12 PROCEDURE — 87086 URINE CULTURE/COLONY COUNT: CPT

## 2019-07-12 PROCEDURE — 76700 US EXAM ABDOM COMPLETE: CPT

## 2019-07-12 PROCEDURE — 76700 US EXAM ABDOM COMPLETE: CPT | Mod: 26

## 2019-07-12 PROCEDURE — 80053 COMPREHEN METABOLIC PANEL: CPT

## 2019-07-12 RX ORDER — SODIUM CHLORIDE 9 MG/ML
500 INJECTION INTRAMUSCULAR; INTRAVENOUS; SUBCUTANEOUS ONCE
Refills: 0 | Status: COMPLETED | OUTPATIENT
Start: 2019-07-12 | End: 2019-07-12

## 2019-07-12 RX ORDER — ONDANSETRON 8 MG/1
4 TABLET, FILM COATED ORAL ONCE
Refills: 0 | Status: COMPLETED | OUTPATIENT
Start: 2019-07-12 | End: 2019-07-12

## 2019-07-12 RX ORDER — CEPHALEXIN 500 MG
1 CAPSULE ORAL
Qty: 21 | Refills: 0
Start: 2019-07-12 | End: 2019-07-18

## 2019-07-12 RX ORDER — LIDOCAINE 4 G/100G
10 CREAM TOPICAL ONCE
Refills: 0 | Status: COMPLETED | OUTPATIENT
Start: 2019-07-12 | End: 2019-07-12

## 2019-07-12 RX ORDER — CEFTRIAXONE 500 MG/1
1000 INJECTION, POWDER, FOR SOLUTION INTRAMUSCULAR; INTRAVENOUS ONCE
Refills: 0 | Status: COMPLETED | OUTPATIENT
Start: 2019-07-12 | End: 2019-07-12

## 2019-07-12 RX ORDER — FAMOTIDINE 10 MG/ML
20 INJECTION INTRAVENOUS ONCE
Refills: 0 | Status: COMPLETED | OUTPATIENT
Start: 2019-07-12 | End: 2019-07-12

## 2019-07-12 RX ADMIN — FAMOTIDINE 20 MILLIGRAM(S): 10 INJECTION INTRAVENOUS at 17:01

## 2019-07-12 RX ADMIN — LIDOCAINE 10 MILLILITER(S): 4 CREAM TOPICAL at 17:04

## 2019-07-12 RX ADMIN — CEFTRIAXONE 100 MILLIGRAM(S): 500 INJECTION, POWDER, FOR SOLUTION INTRAMUSCULAR; INTRAVENOUS at 20:00

## 2019-07-12 RX ADMIN — Medication 30 MILLILITER(S): at 17:04

## 2019-07-12 RX ADMIN — ONDANSETRON 4 MILLIGRAM(S): 8 TABLET, FILM COATED ORAL at 16:59

## 2019-07-12 RX ADMIN — SODIUM CHLORIDE 500 MILLILITER(S): 9 INJECTION INTRAMUSCULAR; INTRAVENOUS; SUBCUTANEOUS at 17:00

## 2019-07-12 NOTE — ED PROVIDER NOTE - ATTENDING CONTRIBUTION TO CARE
78 yo F presents with 2 days of upper abdominal discomfort and bloating sensation. states that she feels very full for two days straight. no real pain. + nausea, decreased po intake over past two days secondary to nausea and discomfort. doesn't know if food/drink aggravates the pain. symptoms are not exertional. vomited her food once yesterday. loose stools but not diarrhea. not melena, no bright red blood. had similar pain last year went to ED, ct negative, pain went away.   no f/ch, cp, sob, urinary complaints.   PE well appearing. lungs CTA. upper abdominal TTP. not peritoneal. no CVAT. 80 yo F presents with 2 days of upper abdominal discomfort (burning) and bloating sensation. states that she feels very full for two days straight. no real pain. + nausea, decreased po intake over past two days secondary to nausea and discomfort. doesn't know if food/drink aggravates the pain. symptoms are not exertional. vomited her food once yesterday. loose stools but not diarrhea. not melena, no bright red blood. had similar pain last year went to ED, ct negative, pain went away.   no f/ch, cp, sob, urinary complaints.   PE well appearing. lungs CTA. upper abdominal TTP. not peritoneal. no CVAT. neuro intact.   CT done without IV contrast as patient reports resp symptoms and oral swelling with IV contrast in the past  labs unremarkable. UA appears positive for infection with moder LE, and WBCs. trop negative x2, ekg with  no acute abnormalities, CXR clear, CT a/p with no acute pathology. there was hydronephrosis and othe rincidental findings which were discussed with pt. pt treated with ivfs, zofran, and gi cocktail in the er. on patient re-eval she reported full resolution of her symptoms. denied any abd pain, or nausea, tolerated po without vomited, ambulated around the er without any complaints. no abdominal TTP. no pe findings or ct findings concerning for khadar. vs stable, requesting to be discharged. Patient was discharged with instructions to adhere to GERD diet, pepcid daily, course of abx for uti , and follow up with PMD and GI in 1-2 days for repeat evaluation and further management.    I reviewed all results from this ED visit, and discussed ALL results with the patient and/or family, including all abnormal results and incidental findings. All questions/concerns were addressed, and I recommended appropriate follow up for all findings. All discharge instructions were thoroughly discussed with the patient and/or family, as well as important warning signs and new/ worsening symptoms which should necessitate patient's immediate return to the ED. The patient expressed understanding of all results discussed and follow up instructions given.The patient was agreeable with discharge and was discharged from the ED in stable condition.

## 2019-07-12 NOTE — ED ADULT NURSE NOTE - OBJECTIVE STATEMENT
Pt presents for eval of abd pain and tenderness below epigastrium, accompanied by abd bloating.  She has been able to drink sips of water and oral mucosa are moist.  Abd is soft.  C/o nausea, no vomiting.  One episode loose stool yesterday.

## 2019-07-12 NOTE — ED PROVIDER NOTE - NSFOLLOWUPINSTRUCTIONS_ED_ALL_ED_FT
rest and hydration  keflex 500mg every 8 hours for 7 days  Follow up with your primary doctor and Gastroenterologist next week or with ours if you cant  return to the ER immediately for worsening symptoms

## 2019-07-12 NOTE — ED PROVIDER NOTE - CARE PLAN
Principal Discharge DX:	Urinary tract infection without hematuria, site unspecified Principal Discharge DX:	Pain of upper abdomen

## 2019-07-12 NOTE — ED PROVIDER NOTE - NSFOLLOWUPCLINICS_GEN_ALL_ED_FT
[facility]enter[facility]  Gastroenterology  [address] [place] [address] [address]  [place] [address]  Phone: [phone]  Fax:   Follow Up Time: 1-3 Days    [facility] General Internal Medicine  General Internal Medicine  [address] [place] [id number]  Phone: [phone]  Fax:   Follow Up Time: 1-3 Days

## 2019-07-12 NOTE — ED PROVIDER NOTE - OBJECTIVE STATEMENT
80 yo F with pmhx cabg, kidney stones, predm, htn and hld presenting with abdominal pain x two days. Patient states she is having upper abdominal pain, bloating, and decreased PO intake for the past two days. Patient states pain is like a burning sensation that is 6/10 and doesn't radiate. Patient admits to chills and is having "loose" bm. Patient states she took pepcid the first day which helped but stopped taking because she wasn't eating. Patient denies cp, sob, cough, fever, nvd, dysuria, hematuria, melena, brbpr, recent travel, recent antibiotic use, and recent NSAID use. 78 yo F with pmhx cabg, kidney stones, predm, htn and hld presenting with abdominal pain x two days. Patient states she is having upper abdominal pain, bloating, and decreased PO intake for the past two days. Patient states pain is like a burning sensation that is 6/10 and doesn't radiate. Patient admits to chills and is having "loose" bm. Patient states she took pepcid the first day which helped but stopped taking because she wasn't eating. Patient denies cp, sob, cough, fever, nvd, dysuria, hematuria, melena, brbpr, recent travel, recent antibiotic use, and recent NSAID use. patient scheduled for colonoscopy in august. Hasn't had an endoscopy in "years"

## 2019-07-12 NOTE — ED ADULT NURSE NOTE - CHPI ED NUR SYMPTOMS NEG
no hematuria/no abdominal distension/no dysuria/no blood in stool/no burning urination/no fever/no vomiting/no chills

## 2019-07-12 NOTE — ED PROVIDER NOTE - PROGRESS NOTE DETAILS
discussed results with patient. Patient currently asymptomatic. Will follow up with her primary doctor and gastroenterologist

## 2019-07-13 LAB
CULTURE RESULTS: SIGNIFICANT CHANGE UP
SPECIMEN SOURCE: SIGNIFICANT CHANGE UP

## 2019-08-08 ENCOUNTER — OUTPATIENT (OUTPATIENT)
Dept: OUTPATIENT SERVICES | Facility: HOSPITAL | Age: 79
LOS: 1 days | Discharge: STILL ADMITTED OR ADMINISTRATIVE | End: 2019-08-08

## 2019-09-13 ENCOUNTER — INPATIENT (INPATIENT)
Facility: HOSPITAL | Age: 79
LOS: 1 days | Discharge: HOME | DRG: 392 | End: 2019-09-15
Attending: INTERNAL MEDICINE | Admitting: INTERNAL MEDICINE
Payer: COMMERCIAL

## 2019-09-13 VITALS
OXYGEN SATURATION: 100 % | RESPIRATION RATE: 21 BRPM | SYSTOLIC BLOOD PRESSURE: 214 MMHG | HEART RATE: 63 BPM | WEIGHT: 117.95 LBS | DIASTOLIC BLOOD PRESSURE: 82 MMHG | HEIGHT: 60 IN | TEMPERATURE: 98 F

## 2019-09-13 PROCEDURE — 93010 ELECTROCARDIOGRAM REPORT: CPT | Mod: GC

## 2019-09-13 PROCEDURE — 99285 EMERGENCY DEPT VISIT HI MDM: CPT | Mod: GC

## 2019-09-13 RX ORDER — ACETAMINOPHEN 500 MG
650 TABLET ORAL ONCE
Refills: 0 | Status: COMPLETED | OUTPATIENT
Start: 2019-09-13 | End: 2019-09-13

## 2019-09-13 NOTE — ED PROVIDER NOTE - OBJECTIVE STATEMENT
78 yo F with PMH of CABG, HTN, HLD, kidney stones comes in for pressure-like chest pain x1 week.    Pt started to experience intermittent pressure-like chest pain along with "stomach bloating" x 1 week. Pt recently saw her cardiologist and underwent cath yesterday at [facility] which showed that 2 of her grafts are gone and it was decided that the patient would be treated medically with ?Ranolazine. Patient decided to come in because pain has been worsening today.    Chest pain comes in waves. Non-exertional and non-radiating. It is worse with deep inspiration and lying on her back. Improves when she lies on her side. Pain is not worse at night or after meals. Pt states pain is similar to prior ACS. Pain is associated with occasional SOB and increased bilateral leg swelling. No new onset numbness/tingling or calf pain. No f/N/V/diaphoresis.     PMH: As above  Allergies: Aspirin (lip swells)  SH: Prior 20+ years smoking history 2 cigarettes/day. No illicit drug use. Lives in co-op by herself. 78 yo F with PMH of CABG, HTN, HLD, kidney stones comes in for pressure-like chest pain x1 week.    Pt started to experience intermittent pressure-like chest pain along with "stomach bloating" x 1 week. Pt recently saw her cardiologist and underwent cath yesterday at [facility] which showed that 2 of her grafts are gone and it was decided that the patient would be treated medically with ?Ranolazine. Patient decided to come in because pain has been worsening today.    Chest pain comes in waves. Non-exertional and non-radiating. It is worse with deep inspiration and lying on her back. Improves when she lies on her side. Pain is not worse at night or after meals. Pt states pain is similar to prior ACS. Pain is associated with occasional SOB and increased bilateral leg swelling. No new onset numbness/tingling or calf pain. No f/N/V/diaphoresis.     PMH: As above  Allergies: Aspirin (lip swells)  Medications: Clopidogrel, Rosuvastatin, Carvedilol, Hydralazine, Lorazepam, Pioglitazone, Vitamin D  SH: Prior 20+ years smoking history 2 cigarettes/day. No illicit drug use. Lives in co-op by herself. 78 yo F with PMH of CABG, HTN, HLD, kidney stones comes in for pressure-like chest pain x1 week.    Pt started to experience intermittent pressure-like chest pain along with constant "stomach bloating" x 1 week. Pt recently saw her cardiologist and underwent cath yesterday at [facility] which showed that 2 of her grafts were non-functioning and it was decided that the patient would be treated medically with unknown drug. Patient decided to come in because pain has been worsening today. Pt has cardiology follow up next week with Dr. Carlton.     Chest pain comes in waves. Non-exertional and non-radiating. It is worse with deep inspiration and lying on her back. Improves when she lies on her side. Pain is not worse at night or after meals. Pt states pain is similar to prior ACS. Pain is associated with occasional SOB and increased bilateral leg swelling. No new onset numbness/tingling or calf pain. No f/N/V/diaphoresis.     PMH: As above  Allergies: Aspirin (lip swells)  Medications: Clopidogrel, Rosuvastatin, Carvedilol, Hydralazine, Lorazepam, Pioglitazone, Vitamin D  SH: Prior 20+ years smoking history 2 cigarettes/day. No illicit drug use. Lives in co-op by herself.

## 2019-09-13 NOTE — ED PROVIDER NOTE - ATTENDING CONTRIBUTION TO CARE
Attending MD Alvarado.  Agree with above.  Pt is a 78 yo female with pmhx of CABG with cath yesterday.  Presents with 1 wk epigastric abdominal pain rather than chest pain x 1 wk.  Pain is intermittent, she feels bloated.  PT had a cath yesterday demonstrating 2 of her CABG grafts are non-functioning.  Has cards follow-up in 1 wk.  Pt presents today because of persistent epigastric abdominal pain. Attending MD Alvarado.  Agree with above.  Pt is a 80 yo female with pmhx of CABG with cath yesterday.  Presents with 1 wk epigastric abdominal pain rather than chest pain x 1 wk.  Pain is intermittent, she feels bloated.  PT had a cath yesterday demonstrating 2 of her CABG grafts are non-functioning.  Has cards follow-up in 1 wk.  Pt presents today because of persistent epigastric abdominal pain.    Pt denies assoc SOB.  RUQ TTP on exam.  Given cath yesterday without acute intervention at that time, unlikely cardiac origin however cath report not readily available.  RUQ sono obtained without acutely actionable findings.  On the way back from sono pt began to feel light-headed and nauseas.  Zofran and fluids ordered and pending at time of signout with plan to reassess and dispo with likely d/c if improved.

## 2019-09-13 NOTE — ED PROVIDER NOTE - CLINICAL SUMMARY MEDICAL DECISION MAKING FREE TEXT BOX
80 yo F with PMH of CABG, HTN, HLD, kidney stones comes in for pressure-like chest pain and abdominal bloating x1 week. Patient received cath yesterday and was decided to be medically treated. EKG shows no acute ST pathologies. Differential includes ACS, GERD, CHF, Pneumonia.

## 2019-09-13 NOTE — ED ADULT NURSE NOTE - OBJECTIVE STATEMENT
79 y.o F A&OX3, with PMH of prediabetes, MI, cardiac stents, CKD, MRSA, CAD, HLD, HTN and kidney stones presents to the ED c.o chest pressure, and ABD bloating x1 week. Pt. reports intermittent substernal chest pressure - states "feels like elephant is sitting on my chest." Pt. was seen by PMD since symptoms presented and had cardiac cath yesterday at [facility]. Pt. reports results revealed two grafts are not functioning properly and was started on new medication - pt. unsure of what medication is called. Pt. reports she continued to have chest pressure after cath. Pt. has a follow up appointment scheduled with cardiologist. Reports CP is intermittent, improves at night at rest. States she has been having constant ABD discomfort and bloating also. Endorses chronic lower extremity swelling and numbness/tingling sensation. Pt. does not appear to be in any acute distress at this time. IV access obtained. Denies HA, vomiting, fevers, chills, urinary symptoms. EKG done. Pt. placed on CM. Safety and comfort provided. Family at bedside.

## 2019-09-13 NOTE — ED PROVIDER NOTE - PHYSICAL EXAMINATION
General: Non-toxic appearing woman. AAOX3  CV: Normal s1s2. Soft systolic murmur appreciated. RRR. 2+DP pulses  Resp: CTABL  Abd: Epigastric tenderness. Soft ND abdomen.  MSK: No sternal tenderness  Extremities: 1+ bilateral nonpitting edema. No calf tenderness or erythema  Neuro: 5+ strength in LEs. Numbness around bilateral ankles

## 2019-09-13 NOTE — ED PROVIDER NOTE - PROGRESS NOTE DETAILS
Pt was informed about ekg and lab results. She states she currently does not feel any chest pressure and pain improved even prior to her receiving medications

## 2019-09-14 DIAGNOSIS — R10.13 EPIGASTRIC PAIN: ICD-10-CM

## 2019-09-14 LAB
ALBUMIN SERPL ELPH-MCNC: 4.3 G/DL — SIGNIFICANT CHANGE UP (ref 3.3–5)
ALP SERPL-CCNC: 47 U/L — SIGNIFICANT CHANGE UP (ref 40–120)
ALT FLD-CCNC: 13 U/L — SIGNIFICANT CHANGE UP (ref 10–45)
ANION GAP SERPL CALC-SCNC: 14 MMOL/L — SIGNIFICANT CHANGE UP (ref 5–17)
APTT BLD: 27.6 SEC — SIGNIFICANT CHANGE UP (ref 27.5–36.3)
AST SERPL-CCNC: 27 U/L — SIGNIFICANT CHANGE UP (ref 10–40)
BASOPHILS # BLD AUTO: 0 K/UL — SIGNIFICANT CHANGE UP (ref 0–0.2)
BASOPHILS NFR BLD AUTO: 0.8 % — SIGNIFICANT CHANGE UP (ref 0–2)
BILIRUB SERPL-MCNC: 0.4 MG/DL — SIGNIFICANT CHANGE UP (ref 0.2–1.2)
BUN SERPL-MCNC: 14 MG/DL — SIGNIFICANT CHANGE UP (ref 7–23)
CALCIUM SERPL-MCNC: 9.9 MG/DL — SIGNIFICANT CHANGE UP (ref 8.4–10.5)
CHLORIDE SERPL-SCNC: 106 MMOL/L — SIGNIFICANT CHANGE UP (ref 96–108)
CO2 SERPL-SCNC: 21 MMOL/L — LOW (ref 22–31)
CREAT SERPL-MCNC: 1.42 MG/DL — HIGH (ref 0.5–1.3)
EOSINOPHIL # BLD AUTO: 0.1 K/UL — SIGNIFICANT CHANGE UP (ref 0–0.5)
EOSINOPHIL NFR BLD AUTO: 2.8 % — SIGNIFICANT CHANGE UP (ref 0–6)
GAS PNL BLDV: SIGNIFICANT CHANGE UP
GLUCOSE SERPL-MCNC: 103 MG/DL — HIGH (ref 70–99)
HCT VFR BLD CALC: 31.3 % — LOW (ref 34.5–45)
HGB BLD-MCNC: 10.5 G/DL — LOW (ref 11.5–15.5)
INR BLD: 1.06 RATIO — SIGNIFICANT CHANGE UP (ref 0.88–1.16)
LYMPHOCYTES # BLD AUTO: 1.2 K/UL — SIGNIFICANT CHANGE UP (ref 1–3.3)
LYMPHOCYTES # BLD AUTO: 27.9 % — SIGNIFICANT CHANGE UP (ref 13–44)
MCHC RBC-ENTMCNC: 31.9 PG — SIGNIFICANT CHANGE UP (ref 27–34)
MCHC RBC-ENTMCNC: 33.4 GM/DL — SIGNIFICANT CHANGE UP (ref 32–36)
MCV RBC AUTO: 95.4 FL — SIGNIFICANT CHANGE UP (ref 80–100)
MONOCYTES # BLD AUTO: 0.5 K/UL — SIGNIFICANT CHANGE UP (ref 0–0.9)
MONOCYTES NFR BLD AUTO: 11.6 % — SIGNIFICANT CHANGE UP (ref 2–14)
NEUTROPHILS # BLD AUTO: 2.4 K/UL — SIGNIFICANT CHANGE UP (ref 1.8–7.4)
NEUTROPHILS NFR BLD AUTO: 56.8 % — SIGNIFICANT CHANGE UP (ref 43–77)
NT-PROBNP SERPL-SCNC: 546 PG/ML — HIGH (ref 0–300)
PLATELET # BLD AUTO: 117 K/UL — LOW (ref 150–400)
POTASSIUM SERPL-MCNC: 3.6 MMOL/L — SIGNIFICANT CHANGE UP (ref 3.5–5.3)
POTASSIUM SERPL-SCNC: 3.6 MMOL/L — SIGNIFICANT CHANGE UP (ref 3.5–5.3)
PROT SERPL-MCNC: 7 G/DL — SIGNIFICANT CHANGE UP (ref 6–8.3)
PROTHROM AB SERPL-ACNC: 12.1 SEC — SIGNIFICANT CHANGE UP (ref 10–12.9)
RBC # BLD: 3.29 M/UL — LOW (ref 3.8–5.2)
RBC # FLD: 12.3 % — SIGNIFICANT CHANGE UP (ref 10.3–14.5)
SODIUM SERPL-SCNC: 141 MMOL/L — SIGNIFICANT CHANGE UP (ref 135–145)
TROPONIN T, HIGH SENSITIVITY RESULT: 12 NG/L — SIGNIFICANT CHANGE UP (ref 0–51)
TROPONIN T, HIGH SENSITIVITY RESULT: 14 NG/L — SIGNIFICANT CHANGE UP (ref 0–51)
WBC # BLD: 4.3 K/UL — SIGNIFICANT CHANGE UP (ref 3.8–10.5)
WBC # FLD AUTO: 4.3 K/UL — SIGNIFICANT CHANGE UP (ref 3.8–10.5)

## 2019-09-14 PROCEDURE — 71046 X-RAY EXAM CHEST 2 VIEWS: CPT | Mod: 26

## 2019-09-14 PROCEDURE — 74176 CT ABD & PELVIS W/O CONTRAST: CPT | Mod: 26

## 2019-09-14 PROCEDURE — 76705 ECHO EXAM OF ABDOMEN: CPT | Mod: 26

## 2019-09-14 RX ORDER — SODIUM CHLORIDE 9 MG/ML
1000 INJECTION INTRAMUSCULAR; INTRAVENOUS; SUBCUTANEOUS
Refills: 0 | Status: DISCONTINUED | OUTPATIENT
Start: 2019-09-14 | End: 2019-09-15

## 2019-09-14 RX ORDER — HYDRALAZINE HCL 50 MG
25 TABLET ORAL ONCE
Refills: 0 | Status: COMPLETED | OUTPATIENT
Start: 2019-09-14 | End: 2019-09-14

## 2019-09-14 RX ORDER — SUCRALFATE 1 G
1 TABLET ORAL
Refills: 0 | Status: DISCONTINUED | OUTPATIENT
Start: 2019-09-14 | End: 2019-09-15

## 2019-09-14 RX ORDER — LIDOCAINE 4 G/100G
10 CREAM TOPICAL ONCE
Refills: 0 | Status: COMPLETED | OUTPATIENT
Start: 2019-09-14 | End: 2019-09-14

## 2019-09-14 RX ORDER — FAMOTIDINE 10 MG/ML
20 INJECTION INTRAVENOUS ONCE
Refills: 0 | Status: COMPLETED | OUTPATIENT
Start: 2019-09-14 | End: 2019-09-14

## 2019-09-14 RX ORDER — CARVEDILOL PHOSPHATE 80 MG/1
12.5 CAPSULE, EXTENDED RELEASE ORAL EVERY 12 HOURS
Refills: 0 | Status: DISCONTINUED | OUTPATIENT
Start: 2019-09-14 | End: 2019-09-15

## 2019-09-14 RX ORDER — ROSUVASTATIN CALCIUM 5 MG/1
40 TABLET ORAL AT BEDTIME
Refills: 0 | Status: DISCONTINUED | OUTPATIENT
Start: 2019-09-14 | End: 2019-09-15

## 2019-09-14 RX ORDER — CHOLECALCIFEROL (VITAMIN D3) 125 MCG
1000 CAPSULE ORAL
Refills: 0 | Status: DISCONTINUED | OUTPATIENT
Start: 2019-09-14 | End: 2019-09-15

## 2019-09-14 RX ORDER — ONDANSETRON 8 MG/1
4 TABLET, FILM COATED ORAL ONCE
Refills: 0 | Status: COMPLETED | OUTPATIENT
Start: 2019-09-14 | End: 2019-09-14

## 2019-09-14 RX ORDER — MIRTAZAPINE 45 MG/1
30 TABLET, ORALLY DISINTEGRATING ORAL DAILY
Refills: 0 | Status: DISCONTINUED | OUTPATIENT
Start: 2019-09-14 | End: 2019-09-15

## 2019-09-14 RX ORDER — HEPARIN SODIUM 5000 [USP'U]/ML
5000 INJECTION INTRAVENOUS; SUBCUTANEOUS EVERY 12 HOURS
Refills: 0 | Status: DISCONTINUED | OUTPATIENT
Start: 2019-09-14 | End: 2019-09-15

## 2019-09-14 RX ORDER — PANTOPRAZOLE SODIUM 20 MG/1
40 TABLET, DELAYED RELEASE ORAL
Refills: 0 | Status: DISCONTINUED | OUTPATIENT
Start: 2019-09-14 | End: 2019-09-15

## 2019-09-14 RX ORDER — INFLUENZA VIRUS VACCINE 15; 15; 15; 15 UG/.5ML; UG/.5ML; UG/.5ML; UG/.5ML
0.5 SUSPENSION INTRAMUSCULAR ONCE
Refills: 0 | Status: DISCONTINUED | OUTPATIENT
Start: 2019-09-14 | End: 2019-09-15

## 2019-09-14 RX ORDER — SIMETHICONE 80 MG/1
80 TABLET, CHEWABLE ORAL
Refills: 0 | Status: DISCONTINUED | OUTPATIENT
Start: 2019-09-14 | End: 2019-09-15

## 2019-09-14 RX ORDER — HYDRALAZINE HCL 50 MG
25 TABLET ORAL
Refills: 0 | Status: DISCONTINUED | OUTPATIENT
Start: 2019-09-14 | End: 2019-09-15

## 2019-09-14 RX ORDER — SUCRALFATE 1 G
1 TABLET ORAL ONCE
Refills: 0 | Status: COMPLETED | OUTPATIENT
Start: 2019-09-14 | End: 2019-09-14

## 2019-09-14 RX ORDER — CLOPIDOGREL BISULFATE 75 MG/1
75 TABLET, FILM COATED ORAL DAILY
Refills: 0 | Status: DISCONTINUED | OUTPATIENT
Start: 2019-09-14 | End: 2019-09-15

## 2019-09-14 RX ORDER — ATORVASTATIN CALCIUM 80 MG/1
80 TABLET, FILM COATED ORAL AT BEDTIME
Refills: 0 | Status: DISCONTINUED | OUTPATIENT
Start: 2019-09-14 | End: 2019-09-14

## 2019-09-14 RX ORDER — SODIUM CHLORIDE 9 MG/ML
1000 INJECTION INTRAMUSCULAR; INTRAVENOUS; SUBCUTANEOUS ONCE
Refills: 0 | Status: COMPLETED | OUTPATIENT
Start: 2019-09-14 | End: 2019-09-14

## 2019-09-14 RX ADMIN — SODIUM CHLORIDE 1000 MILLILITER(S): 9 INJECTION INTRAMUSCULAR; INTRAVENOUS; SUBCUTANEOUS at 07:19

## 2019-09-14 RX ADMIN — Medication 1 GRAM(S): at 21:58

## 2019-09-14 RX ADMIN — CLOPIDOGREL BISULFATE 75 MILLIGRAM(S): 75 TABLET, FILM COATED ORAL at 18:41

## 2019-09-14 RX ADMIN — SIMETHICONE 80 MILLIGRAM(S): 80 TABLET, CHEWABLE ORAL at 21:58

## 2019-09-14 RX ADMIN — FAMOTIDINE 20 MILLIGRAM(S): 10 INJECTION INTRAVENOUS at 15:26

## 2019-09-14 RX ADMIN — SODIUM CHLORIDE 1000 MILLILITER(S): 9 INJECTION INTRAMUSCULAR; INTRAVENOUS; SUBCUTANEOUS at 09:28

## 2019-09-14 RX ADMIN — CARVEDILOL PHOSPHATE 12.5 MILLIGRAM(S): 80 CAPSULE, EXTENDED RELEASE ORAL at 23:24

## 2019-09-14 RX ADMIN — FAMOTIDINE 20 MILLIGRAM(S): 10 INJECTION INTRAVENOUS at 00:46

## 2019-09-14 RX ADMIN — ROSUVASTATIN CALCIUM 40 MILLIGRAM(S): 5 TABLET ORAL at 23:24

## 2019-09-14 RX ADMIN — HEPARIN SODIUM 5000 UNIT(S): 5000 INJECTION INTRAVENOUS; SUBCUTANEOUS at 18:41

## 2019-09-14 RX ADMIN — Medication 25 MILLIGRAM(S): at 21:58

## 2019-09-14 RX ADMIN — ONDANSETRON 4 MILLIGRAM(S): 8 TABLET, FILM COATED ORAL at 07:33

## 2019-09-14 RX ADMIN — Medication 30 MILLILITER(S): at 00:46

## 2019-09-14 RX ADMIN — Medication 1 GRAM(S): at 14:54

## 2019-09-14 RX ADMIN — Medication 25 MILLIGRAM(S): at 09:29

## 2019-09-14 RX ADMIN — SODIUM CHLORIDE 60 MILLILITER(S): 9 INJECTION INTRAMUSCULAR; INTRAVENOUS; SUBCUTANEOUS at 18:41

## 2019-09-14 RX ADMIN — LIDOCAINE 10 MILLILITER(S): 4 CREAM TOPICAL at 09:29

## 2019-09-14 RX ADMIN — MIRTAZAPINE 30 MILLIGRAM(S): 45 TABLET, ORALLY DISINTEGRATING ORAL at 23:24

## 2019-09-14 RX ADMIN — Medication 25 MILLIGRAM(S): at 02:50

## 2019-09-14 NOTE — H&P ADULT - NSHPLABSRESULTS_GEN_ALL_CORE
LABS:                        10.5   4.3   )-----------( 117      ( 14 Sep 2019 00:03 )             31.3     09-14    141  |  106  |  14  ----------------------------<  103<H>  3.6   |  21<L>  |  1.42<H>    Ca    9.9      14 Sep 2019 00:03    TPro  7.0  /  Alb  4.3  /  TBili  0.4  /  DBili  x   /  AST  27  /  ALT  13  /  AlkPhos  47  09-14    PT/INR - ( 14 Sep 2019 00:03 )   PT: 12.1 sec;   INR: 1.06 ratio         PTT - ( 14 Sep 2019 00:03 )  PTT:27.6 sec            09-14 @ 15:47  3.6  58

## 2019-09-14 NOTE — CONSULT NOTE ADULT - ASSESSMENT
Pt started to experience intermittent pressure-like chest pain along with constant "stomach bloating" x 1 week. Pt recently saw her cardiologist and underwent cath yesterday at [facility] which showed that 2 of her grafts were non-functioning and it was decided that the patient would be treated medically with unknown drug. Patient decided to come in because pain has been worsening today. Pt has cardiology follow up next week with Dr. Carlton. Chest pain comes in waves. chest pain Non-exertional and non-radiating. It is worse with deep inspiration and lying on her back. Improves when she lies on her side. Pain is not worse at night or after meals. Pt states pain is similar to prior ACS. Pain is associated with occasional SOB and increased bilateral leg swelling. No new onset numbness/tingling or calf pain. No f/N/V/diaphoresis.  pt s/p recent cath at [facility] on medical therapy  need tro review the cath film.  continue medical therapy  r/o gastroparesis/ischemia  check abdominal doppler  echo check lvf  tsh  lipid  continue beta blocker ? add imdur ?ranexa  dvt prophylaxis  asa daily  check amylase and lipase

## 2019-09-14 NOTE — H&P ADULT - NSHPPHYSICALEXAM_GEN_ALL_CORE
PHYSICAL EXAMINATION:  Vital Signs Last 24 Hrs  T(C): 36.7 (14 Sep 2019 07:08), Max: 36.9 (14 Sep 2019 00:51)  T(F): 98 (14 Sep 2019 07:08), Max: 98.4 (14 Sep 2019 00:51)  HR: 62 (14 Sep 2019 09:32) (60 - 71)  BP: 172/68 (14 Sep 2019 09:32) (169/73 - 214/82)  BP(mean): --  RR: 18 (14 Sep 2019 09:32) (16 - 21)  SpO2: 100% (14 Sep 2019 09:32) (97% - 100%)  CAPILLARY BLOOD GLUCOSE      POCT Blood Glucose.: 103 mg/dL (14 Sep 2019 06:59)        GENERAL: NAD, well-groomed,  HEAD:  atraumatic, normocephalic  EYES: sclera anicteric  ENMT: mucous membranes moist  NECK: supple, No JVD  CHEST/LUNG: clear to auscultation bilaterally;    no      rales   ,   no rhonchi,   HEART: normal S1, S2  ABDOMEN: BS+, soft, ND, NT   EXTREMITIES:    no    edema    b/l LEs  NEURO: awake, ,     moves all extremities  SKIN: no     rash

## 2019-09-14 NOTE — CONSULT NOTE ADULT - ASSESSMENT
Impression:  # Abdominal Pain: DDx: PUD, gastritis, viral gastroenteritis, cardiac ACS. CT Abd/pelvis negative for acute issues  # CAD s/p CABG with 2 nonpatent grafts  # Hypertension  # Anemia: hemodynamically stable, await outpatient colonoscopy    Recommendation:  - can trial pantoprazole 40mg po daily, 30 minutes before meals  - can take pepcid night for symptoms prior to sleep  - simethicone for bloating  - IV hydration and advance diet as tolerated  - consider cardiac consultation to rule out cardiac cause given comorbid conditions  - supportive care per primary, no further GI workup necessary at this time    Kg Ferris  [phone]  [id number]  Please call/page on call fellow on weekends and weekdays after 5pm

## 2019-09-14 NOTE — CONSULT NOTE ADULT - SUBJECTIVE AND OBJECTIVE BOX
CHIEF COMPLAINT:Patient is a 79y old  Female who presents with a chief complaint of chest pain /Abdominal pain.    HPI:  	Pt started to experience intermittent pressure-like chest pain along with constant "stomach bloating" x 1 week. Pt recently saw her cardiologist and underwent cath yesterday at [facility] which showed that 2 of her grafts were non-functioning and it was decided that the patient would be treated medically with unknown drug. Patient decided to come in because pain has been worsening today. Pt has cardiology follow up next week with Dr. Carlton.     	Chest pain comes in waves. Non-exertional and non-radiating. It is worse with deep inspiration and lying on her back. Improves when she lies on her side. Pain is not worse at night or after meals. Pt states pain is similar to prior ACS. Pain is associated with occasional SOB and increased bilateral leg swelling. No new onset numbness/tingling or calf pain. No f/N/V/diaphoresis.       PAST MEDICAL & SURGICAL HISTORY:  Prediabetes  Cataracts, bilateral  Myocardial infarct  Coronary artery disease: s/p PCI with one stent to LAD  MRSA infection: nasal abscess  Depression  Dyslipidemia  Kidney stones  Hypertension  S/P coronary artery bypass graft x 3: RCA, OM &amp; LAD bypassed (SVG x 2, LIMA x 1)      MEDICATIONS  (STANDING):    MEDICATIONS  (PRN):      FAMILY HISTORY:      SOCIAL HISTORY:    [ ] Non-smoker  [ ] Smoker  [ ] Alcohol    Allergies    aspirin (Other; Swelling)  latex (Pruritus; Rash)    Intolerances    	    REVIEW OF SYSTEMS:  CONSTITUTIONAL: No fever, weight loss, or fatigue  EYES: No eye pain, visual disturbances, or discharge  ENT:  No difficulty hearing, tinnitus, vertigo; No sinus or throat pain  NECK: No pain or stiffness  RESPIRATORY: No cough, wheezing, chills or hemoptysis; + Shortness of Breath  CARDIOVASCULAR: No chest pain, palpitations, passing out, dizziness, or leg swelling  GASTROINTESTINAL:+ abdominal or epigastric pain. No nausea, vomiting, or hematemesis; No diarrhea or constipation. No melena or hematochezia.  GENITOURINARY: No dysuria, frequency, hematuria, or incontinence  NEUROLOGICAL: No headaches, memory loss, loss of strength, numbness, or tremors  SKIN: No itching, burning, rashes, or lesions   LYMPH Nodes: No enlarged glands  ENDOCRINE: No heat or cold intolerance; No hair loss  MUSCULOSKELETAL: No joint pain or swelling; No muscle, back, or extremity pain  PSYCHIATRIC: No depression, anxiety, mood swings, or difficulty sleeping  HEME/LYMPH: No easy bruising, or bleeding gums  ALLERGY AND IMMUNOLOGIC: No hives or eczema	    [ ] All others negative	  [ ] Unable to obtain    PHYSICAL EXAM:  T(C): 36.7 (09-14-19 @ 07:08), Max: 36.9 (09-14-19 @ 00:51)  HR: 62 (09-14-19 @ 09:32) (60 - 71)  BP: 172/68 (09-14-19 @ 09:32) (169/73 - 214/82)  RR: 18 (09-14-19 @ 09:32) (16 - 21)  SpO2: 100% (09-14-19 @ 09:32) (97% - 100%)  Wt(kg): --  I&O's Summary      Appearance: Normal	  HEENT:   Normal oral mucosa, PERRL, EOMI	  Lymphatic: No lymphadenopathy  Cardiovascular: Normal S1 S2, No JVD,+ murmurs, No edema  Respiratory: rhonchi  Psychiatry: A & O x 3, Mood & affect appropriate  Gastrointestinal:  Soft, Non-tender, + BS	  Skin: No rashes, No ecchymoses, No cyanosis	  Neurologic: Non-focal  Extremities: Normal range of motion, No clubbing, cyanosis or edema  Vascular: Peripheral pulses palpable 2+ bilaterally    TELEMETRY: 	    ECG:  	  RADIOLOGY:  OTHER: 	  	  LABS:	 	    CARDIAC MARKERS:                              10.5   4.3   )-----------( 117      ( 14 Sep 2019 00:03 )             31.3     09-14    141  |  106  |  14  ----------------------------<  103<H>  3.6   |  21<L>  |  1.42<H>    Ca    9.9      14 Sep 2019 00:03    TPro  7.0  /  Alb  4.3  /  TBili  0.4  /  DBili  x   /  AST  27  /  ALT  13  /  AlkPhos  47  09-14    proBNP: Serum Pro-Brain Natriuretic Peptide: 546 pg/mL (09-14 @ 00:03)    Lipid Profile:   HgA1c:   TSH:   PT/INR - ( 14 Sep 2019 00:03 )   PT: 12.1 sec;   INR: 1.06 ratio         PTT - ( 14 Sep 2019 00:03 )  PTT:27.6 sec    PREVIOUS DIAGNOSTIC TESTING:    < from: CT Abdomen and Pelvis w/ Oral Cont (09.14.19 @ 13:54) >  BOWEL: No bowel obstruction. Appendix is normal.   PERITONEUM: No ascites.  VESSELS: Atherosclerotic changes.  RETROPERITONEUM/LYMPH NODES: No lymphadenopathy. No retroperitoneal   hematoma.  ABDOMINAL WALL: Postprocedural changes in the right groin.  BONES: Degenerative changes.    IMPRESSION:     No acute pathology.    < from: Xray Chest 2 Views PA/Lat (09.14.19 @ 00:25) >  IMPRESSION: Clear lungs.    < from: 12 Lead ECG (09.14.19 @ 07:38) >  Diagnosis Line NORMAL SINUS RHYTHM  MINIMAL VOLTAGE CRITERIA FOR LVH, MAY BE NORMAL VARIANT  INFERIOR INFARCT , AGE UNDETERMINED  POSSIBLE ANTERIOR INFARCT , AGE UNDETERMINED  ABNORMAL ECG

## 2019-09-14 NOTE — CONSULT NOTE ADULT - ATTENDING COMMENTS
Patient seen and examined. Reports no longer with nausea and tolerated breakfast. She thinks carafate worked for her. Ok to dc home from GI standpoint and follow up with her primary GI, Dr Kenna Ghosh, as outpatient. She has a colonscopy scheduled w Dr Ghosh next week. She may need to have EGD at same time. Patient states she will discuss w Dr ghosh. Continue PPI tx as outpateint.

## 2019-09-14 NOTE — CONSULT NOTE ADULT - SUBJECTIVE AND OBJECTIVE BOX
Chief Complaint:  Patient is a 79y old  Female who presents with a chief complaint of     HPI: Pt is a 78 yo F with PMH CAD s/p CABG, HTN, HLD, kidney stones comes in for progressive chest pain x1 week. Symptoms have been intermittent and substernal. She has associated bloating and abdominal pain. She had recent cath that showed 2 grafts were nonpatent and no intevrntions and being treated medically. She had symptoms and her cardiolgist told her to come to ED. She does not she has had abdominal pain with nausea and difficulty eating for some time. She had eaten small snacks in ED only. Pt concerned symptoms similar to prior symptoms with her cardiac conditions. Denies changes in bowel habits. Of note, planning on colonoscopy on 9/26 for screening purposed. No prior EGD. Takes nexium prn for GERD.      Allergies:  aspirin (Other; Swelling)  latex (Pruritus; Rash)      Home Medications:    Hospital Medications:  aluminum hydroxide/magnesium hydroxide/simethicone Suspension 30 milliLiter(s) Oral once  famotidine Injectable 20 milliGRAM(s) IV Push once  lidocaine 2% Viscous 10 milliLiter(s) Oral once  sucralfate 1 Gram(s) Oral once      PMHX/PSHX:  Prediabetes  Cataracts, bilateral  Myocardial infarct  Coronary artery disease  MRSA infection  Depression  Dyslipidemia  Kidney stones  Hypertension  S/P coronary artery bypass graft x 3      Family history:      Social History:     ROS:     General:  No wt loss, fevers, chills, night sweats, fatigue,   Eyes:  Good vision, no reported pain  ENT:  No sore throat, pain, runny nose, dysphagia  CV:  No pain, palpitations, hypo/hypertension  Resp:  No dyspnea, cough, tachypnea, wheezing  GI:  See HPI  :  No pain, bleeding, incontinence, nocturia  Muscle:  No pain, weakness  Neuro:  No weakness, tingling, memory problems  Psych:  No fatigue, insomnia, mood problems, depression  Endocrine:  No polyuria, polydipsia, cold/heat intolerance  Heme:  No petechiae, ecchymosis, easy bruisability  Skin:  No rash, edema      PHYSICAL EXAM:     Vital Signs:  Vital Signs Last 24 Hrs  T(C): 36.7 (14 Sep 2019 07:08), Max: 36.9 (14 Sep 2019 00:51)  T(F): 98 (14 Sep 2019 07:08), Max: 98.4 (14 Sep 2019 00:51)  HR: 62 (14 Sep 2019 09:32) (60 - 71)  BP: 172/68 (14 Sep 2019 09:32) (169/73 - 214/82)  BP(mean): --  RR: 18 (14 Sep 2019 09:32) (16 - 21)  SpO2: 100% (14 Sep 2019 09:32) (97% - 100%)  Daily Height in cm: 152.4 (13 Sep 2019 21:35)    Daily     GENERAL:  Appears stated age, well-groomed, well-nourished, no distress  HEENT:  NC/AT,  conjunctivae clear and pink  CHEST:  Full & symmetric excursion, no increased effort, breath sounds clear  HEART:  Regular rhythm, S1, S2, no murmur/rub/S3/S4, no abdominal bruit, no edema, no JVD  ABDOMEN:  Soft, tender in epigastrum, non-distended, normoactive bowel sounds,  no masses , no hepatosplenomegaly  EXTREMITIES:  no cyanosis, clubbing or edema  SKIN:  No rash/erythema/ecchymoses/petechiae/wounds/abscess/warm/dry  NEURO:  Alert, oriented    LABS:                        10.5   4.3   )-----------( 117      ( 14 Sep 2019 00:03 )             31.3     09-14    141  |  106  |  14  ----------------------------<  103<H>  3.6   |  21<L>  |  1.42<H>    Ca    9.9      14 Sep 2019 00:03    TPro  7.0  /  Alb  4.3  /  TBili  0.4  /  DBili  x   /  AST  27  /  ALT  13  /  AlkPhos  47  09-14    LIVER FUNCTIONS - ( 14 Sep 2019 00:03 )  Alb: 4.3 g/dL / Pro: 7.0 g/dL / ALK PHOS: 47 U/L / ALT: 13 U/L / AST: 27 U/L / GGT: x           PT/INR - ( 14 Sep 2019 00:03 )   PT: 12.1 sec;   INR: 1.06 ratio         PTT - ( 14 Sep 2019 00:03 )  PTT:27.6 sec    Amylase Serum--      Lipase serum61       Ammonia--      Imaging:    < from: CT Abdomen and Pelvis w/ Oral Cont (09.14.19 @ 13:54) >  FINDINGS:    LOWER CHEST: Trace left pleural effusion.    LIVER: A few scattered hepatic cysts.  BILE DUCTS: Normal caliber.  GALLBLADDER: Within normal limits.  SPLEEN: Within normal limits.  PANCREAS: Within normal limits.  ADRENALS: Within normal limits.  KIDNEYS/URETERS: Small bilateral nonobstructing renal calculi. No   hydronephrosis. Small left renal cyst.    BLADDER: Within normal limits.  REPRODUCTIVE ORGANS: Uterus and adnexa within normal limits.    BOWEL: No bowel obstruction. Appendix is normal.   PERITONEUM: No ascites.  VESSELS: Atherosclerotic changes.  RETROPERITONEUM/LYMPH NODES: No lymphadenopathy. No retroperitoneal   hematoma.  ABDOMINAL WALL: Postprocedural changes in the right groin.  BONES: Degenerative changes.    IMPRESSION:     No acute pathology.      < end of copied text >

## 2019-09-14 NOTE — H&P ADULT - NSHPREVIEWOFSYSTEMS_GEN_ALL_CORE
REVIEW OF SYSTEMS:  GEN: no fever,    no chills  RESP: no SOB,   no cough  CVS:  chest pain,   no palpitations  GI: abdominal pain,   no nausea,   no vomiting,   no constipation,   no diarrhea  : no dysuria,   no frequency  NEURO: no headache,   no dizziness  PSYCH: no depression,   not anxious  Derm : no rash

## 2019-09-14 NOTE — H&P ADULT - NSICDXPASTMEDICALHX_GEN_ALL_CORE_FT
PAST MEDICAL HISTORY:  Cataracts, bilateral     Coronary artery disease s/p PCI with one stent to LAD    Depression     Dyslipidemia     Hypertension     Kidney stones     MRSA infection nasal abscess    Myocardial infarct     Prediabetes

## 2019-09-14 NOTE — H&P ADULT - NSICDXPASTSURGICALHX_GEN_ALL_CORE_FT
PAST SURGICAL HISTORY:  S/P coronary artery bypass graft x 3 RCA, OM & LAD bypassed (SVG x 2, LIMA x 1)

## 2019-09-14 NOTE — H&P ADULT - ASSESSMENT
80 yo F with PMH of CABG, HTN, HLD, kidney stones     comes in for pressure-like   epigasric/ chest pain x1 week.    	Pt started to experience intermittent pressure-like   epigastric  pain along with constant "stomach bloating" x 1 week.   Pt recently saw her cardiologis/  dr suyapa jimenez/  reyes singh and underwent cath yesterday at [facility] which showed that 2 of her grafts were non-functioning and it was decided that the patient would be treated medically   . Patient decided to come in because pain has been worsening today.     very  comfortable  in er  now/ seen by gi  and card  troponin, negative/  tele   mildly elevated lipase   ct abdomen, normal  follow lipase/  labs in am  iv  fluids/  dvt ppx  on asa/ coreg/ plavix/ crestor/ hydralazinr/ remeron       < from: CT Abdomen and Pelvis w/ Oral Cont (09.14.19 @ 13:54) >  IMPRESSION:   No acute pathology  < end of copied text >                        Abdominal Pain: DDx: PUD, gastritis, viral gastroenteritis, cardiac ACS. CT Abd/pelvis negative for acute issues CAD s/p CABG with 2 nonpatent grafts  Hypertension   Anemia: hemodynamically stable, await outpatient colonoscopy. per  gi  -  pantoprazole 40mg po daily, 30 minutes before meals. per  gi 80 yo F with PMH of CABG, HTN, HLD, kidney stones     comes in for pressure-like   epigasric/ chest pain x1 week.    	Pt started to experience intermittent pressure-like   epigastric  pain along with constant "stomach bloating" x 1 week.   Pt recently saw her cardiologis/  dr suyapa jimenez/  reyes singh and underwent cath yesterday at [facility] which showed that 2 of her grafts were non-functioning and it was decided that the patient would be treated medically   . Patient decided to come in because pain has been worsening today.     very  comfortable  in er  now/ seen by gi  and card  troponin, negative/  tele   mildly elevated lipase   ct abdomen, normal  follow lipase/  labs in am  iv  fluids/  dvt ppx  on   coreg/ plavix/ crestor/ hydralazinr/ remeron//   allergic  to asa  abd  dup;ex/ e cho       < from: CT Abdomen and Pelvis w/ Oral Cont (09.14.19 @ 13:54) >  IMPRESSION:   No acute pathology  < end of copied text >                        Abdominal Pain: DDx: PUD, gastritis, viral gastroenteritis, cardiac ACS. CT Abd/pelvis negative for acute issues CAD s/p CABG with 2 nonpatent grafts  Hypertension   Anemia: hemodynamically stable, await outpatient colonoscopy. per  gi  -  pantoprazole 40mg po daily, 30 minutes before meals. per  gi

## 2019-09-14 NOTE — CHART NOTE - NSCHARTNOTEFT_GEN_A_CORE
MEDICINE NP    KIKO VILLA  79y Female    Patient is a 79y old  Female who presents with a chief complaint of chest pain/abdominal pain (14 Sep 2019 17:02)       > Event Summary:  Notified by RN, Patient with refusing Telemetry bed transfer.  Patient seen at bedside w/ son present.  Patient AAOX3, states she does not wish to transfer to Telemetry bed because she was evaluated by outpatient Cardiologist for same symptoms, status post outpatient Cath 9/10/19 for results of Grafts x2 nonpatent with medical management and watchful monitoring.      Sates symptoms are epigastric/ GI related which have resolved once started on Carafate.  Risks and Benefits discussed including cardiac events with patient and son verbalizing understanding  Also Patient requesting home regimen of Coreg 12.5mg BID and Crestor 40mg PO QHS.    -Lipitor changed to Crestor via pharmacy  -D/W Dr. Felder and made aware, recommends to d/c Telemetry order, OK to restart Coreg.       -Vital Signs Last 24 Hrs  T(C): 37.3 (14 Sep 2019 19:42), Max: 37.3 (14 Sep 2019 19:42)  T(F): 99.2 (14 Sep 2019 19:42), Max: 99.2 (14 Sep 2019 19:42)  HR: 67 (14 Sep 2019 19:42) (60 - 71)  BP: 179/72 (14 Sep 2019 19:42) (169/73 - 195/80)  RR: 18 (14 Sep 2019 19:42) (16 - 19)  SpO2: 96% (14 Sep 2019 19:42) (96% - 100%)          CHNADU Sung-BC  Medicine Department

## 2019-09-14 NOTE — H&P ADULT - HISTORY OF PRESENT ILLNESS
78 yo F with PMH of CABG, HTN, HLD, kidney stones     comes in for pressure-like   epigasric/ chest pain x1 week.    	Pt started to experience intermittent pressure-like chest pain along with constant "stomach bloating" x 1 week.   Pt recently saw her cardiologis/  dr suyapa jimenez/  reyes singh and underwent cath yesterday at [facility] which showed that 2 of her grafts were non-functioning and it was decided that the patient would be treated medically   . Patient decided to come in because pain has been worsening today. Pt has cardiology follow up next week with Dr. Jimenez.     	Chest pain comes in waves. Non-exertional and non-radiating. I Improves when she lies on her side. Pain is not worse at night or after meals. Pt states pain is similar to prior ACS.   Pain is   not  associated with  sob    comfortable in er  No new onset numbness/tingling or calf pain. No f/N/V/diaphoresis. 78 yo F with PMH of CABG, HTN, HLD, kidney stones     comes in for pressure-like   epigasric/ chest pain x1 week.    	Pt started to experience intermittent pressure-like  epigastric/ chest pain along with constant "stomach bloating" x 1 week.   Pt recently saw her cardiologis/  dr suyapa jimenez/  reyes singh and underwent cath yesterday at [facility] which showed that 2 of her grafts were non-functioning and it was decided that the patient would be treated medically   . Patient decided to come in because pain has been worsening today. Pt has cardiology follow up next week with Dr. Jimenez.     	Chest pain comes in waves. Non-exertional and non-radiating. I Improves when she lies on her side. Pain is not worse at night or after meals. Pt states pain is similar to prior ACS.   Pain is   not  associated with  sob    comfortable in er  No new onset numbness/tingling or calf pain. No f/N/V/diaphoresis.

## 2019-09-14 NOTE — ED ADULT NURSE REASSESSMENT NOTE - NS ED NURSE REASSESS COMMENT FT1
0730 Received awake, alert and orientedx3. Breathing easy and non labored. Denies chest pain, complained of feeling nauseous and stomach discomfort after eating cracker. Will inform MD. Will continue to reassess.

## 2019-09-15 ENCOUNTER — TRANSCRIPTION ENCOUNTER (OUTPATIENT)
Age: 79
End: 2019-09-15

## 2019-09-15 VITALS
TEMPERATURE: 99 F | OXYGEN SATURATION: 96 % | HEART RATE: 64 BPM | RESPIRATION RATE: 18 BRPM | DIASTOLIC BLOOD PRESSURE: 78 MMHG | SYSTOLIC BLOOD PRESSURE: 178 MMHG

## 2019-09-15 LAB
AMYLASE P1 CFR SERPL: 109 U/L — SIGNIFICANT CHANGE UP (ref 25–125)
ANION GAP SERPL CALC-SCNC: 15 MMOL/L — SIGNIFICANT CHANGE UP (ref 5–17)
BUN SERPL-MCNC: 8 MG/DL — SIGNIFICANT CHANGE UP (ref 7–23)
CALCIUM SERPL-MCNC: 9.2 MG/DL — SIGNIFICANT CHANGE UP (ref 8.4–10.5)
CHLORIDE SERPL-SCNC: 107 MMOL/L — SIGNIFICANT CHANGE UP (ref 96–108)
CHOLEST SERPL-MCNC: 144 MG/DL — SIGNIFICANT CHANGE UP (ref 10–199)
CO2 SERPL-SCNC: 22 MMOL/L — SIGNIFICANT CHANGE UP (ref 22–31)
CREAT SERPL-MCNC: 1.26 MG/DL — SIGNIFICANT CHANGE UP (ref 0.5–1.3)
GLUCOSE SERPL-MCNC: 75 MG/DL — SIGNIFICANT CHANGE UP (ref 70–99)
HCT VFR BLD CALC: 30.9 % — LOW (ref 34.5–45)
HDLC SERPL-MCNC: 66 MG/DL — SIGNIFICANT CHANGE UP
HGB BLD-MCNC: 10.3 G/DL — LOW (ref 11.5–15.5)
LIDOCAIN IGE QN: 47 U/L — SIGNIFICANT CHANGE UP (ref 7–60)
LIPID PNL WITH DIRECT LDL SERPL: 61 MG/DL — SIGNIFICANT CHANGE UP
MCHC RBC-ENTMCNC: 31.5 PG — SIGNIFICANT CHANGE UP (ref 27–34)
MCHC RBC-ENTMCNC: 33.3 GM/DL — SIGNIFICANT CHANGE UP (ref 32–36)
MCV RBC AUTO: 94.5 FL — SIGNIFICANT CHANGE UP (ref 80–100)
PLATELET # BLD AUTO: 113 K/UL — LOW (ref 150–400)
POTASSIUM SERPL-MCNC: 3.5 MMOL/L — SIGNIFICANT CHANGE UP (ref 3.5–5.3)
POTASSIUM SERPL-SCNC: 3.5 MMOL/L — SIGNIFICANT CHANGE UP (ref 3.5–5.3)
RBC # BLD: 3.27 M/UL — LOW (ref 3.8–5.2)
RBC # FLD: 14.1 % — SIGNIFICANT CHANGE UP (ref 10.3–14.5)
SODIUM SERPL-SCNC: 144 MMOL/L — SIGNIFICANT CHANGE UP (ref 135–145)
TOTAL CHOLESTEROL/HDL RATIO MEASUREMENT: 2.2 RATIO — LOW (ref 3.3–7.1)
TRIGL SERPL-MCNC: 87 MG/DL — SIGNIFICANT CHANGE UP (ref 10–149)
TSH SERPL-MCNC: 3.19 UIU/ML — SIGNIFICANT CHANGE UP (ref 0.27–4.2)
WBC # BLD: 4.27 K/UL — SIGNIFICANT CHANGE UP (ref 3.8–10.5)
WBC # FLD AUTO: 4.27 K/UL — SIGNIFICANT CHANGE UP (ref 3.8–10.5)

## 2019-09-15 PROCEDURE — 83690 ASSAY OF LIPASE: CPT

## 2019-09-15 PROCEDURE — 93005 ELECTROCARDIOGRAM TRACING: CPT

## 2019-09-15 PROCEDURE — 82150 ASSAY OF AMYLASE: CPT

## 2019-09-15 PROCEDURE — 82962 GLUCOSE BLOOD TEST: CPT

## 2019-09-15 PROCEDURE — 84295 ASSAY OF SERUM SODIUM: CPT

## 2019-09-15 PROCEDURE — 85027 COMPLETE CBC AUTOMATED: CPT

## 2019-09-15 PROCEDURE — 85610 PROTHROMBIN TIME: CPT

## 2019-09-15 PROCEDURE — 80053 COMPREHEN METABOLIC PANEL: CPT

## 2019-09-15 PROCEDURE — 84132 ASSAY OF SERUM POTASSIUM: CPT

## 2019-09-15 PROCEDURE — 83605 ASSAY OF LACTIC ACID: CPT

## 2019-09-15 PROCEDURE — 96361 HYDRATE IV INFUSION ADD-ON: CPT

## 2019-09-15 PROCEDURE — 96375 TX/PRO/DX INJ NEW DRUG ADDON: CPT | Mod: XU

## 2019-09-15 PROCEDURE — 82947 ASSAY GLUCOSE BLOOD QUANT: CPT

## 2019-09-15 PROCEDURE — 85730 THROMBOPLASTIN TIME PARTIAL: CPT

## 2019-09-15 PROCEDURE — 82330 ASSAY OF CALCIUM: CPT

## 2019-09-15 PROCEDURE — 82803 BLOOD GASES ANY COMBINATION: CPT

## 2019-09-15 PROCEDURE — 84484 ASSAY OF TROPONIN QUANT: CPT

## 2019-09-15 PROCEDURE — 71046 X-RAY EXAM CHEST 2 VIEWS: CPT

## 2019-09-15 PROCEDURE — 85014 HEMATOCRIT: CPT

## 2019-09-15 PROCEDURE — 80048 BASIC METABOLIC PNL TOTAL CA: CPT

## 2019-09-15 PROCEDURE — 96374 THER/PROPH/DIAG INJ IV PUSH: CPT | Mod: XU

## 2019-09-15 PROCEDURE — 83880 ASSAY OF NATRIURETIC PEPTIDE: CPT

## 2019-09-15 PROCEDURE — 80061 LIPID PANEL: CPT

## 2019-09-15 PROCEDURE — 99285 EMERGENCY DEPT VISIT HI MDM: CPT | Mod: 25

## 2019-09-15 PROCEDURE — 99222 1ST HOSP IP/OBS MODERATE 55: CPT | Mod: GC

## 2019-09-15 PROCEDURE — 74176 CT ABD & PELVIS W/O CONTRAST: CPT

## 2019-09-15 PROCEDURE — 76705 ECHO EXAM OF ABDOMEN: CPT

## 2019-09-15 PROCEDURE — 82435 ASSAY OF BLOOD CHLORIDE: CPT

## 2019-09-15 PROCEDURE — 84443 ASSAY THYROID STIM HORMONE: CPT

## 2019-09-15 RX ORDER — ISOSORBIDE MONONITRATE 60 MG/1
1 TABLET, EXTENDED RELEASE ORAL
Qty: 30 | Refills: 0
Start: 2019-09-15 | End: 2019-10-14

## 2019-09-15 RX ORDER — ISOSORBIDE MONONITRATE 60 MG/1
60 TABLET, EXTENDED RELEASE ORAL DAILY
Refills: 0 | Status: DISCONTINUED | OUTPATIENT
Start: 2019-09-15 | End: 2019-09-15

## 2019-09-15 RX ORDER — ZOLPIDEM TARTRATE 10 MG/1
1 TABLET ORAL
Qty: 0 | Refills: 0 | DISCHARGE

## 2019-09-15 RX ORDER — SUCRALFATE 1 G
1 TABLET ORAL
Qty: 60 | Refills: 0
Start: 2019-09-15 | End: 2019-10-14

## 2019-09-15 RX ORDER — PANTOPRAZOLE SODIUM 20 MG/1
1 TABLET, DELAYED RELEASE ORAL
Qty: 30 | Refills: 0
Start: 2019-09-15 | End: 2019-10-14

## 2019-09-15 RX ORDER — HYDRALAZINE HCL 50 MG
25 TABLET ORAL THREE TIMES A DAY
Refills: 0 | Status: DISCONTINUED | OUTPATIENT
Start: 2019-09-15 | End: 2019-09-15

## 2019-09-15 RX ADMIN — CARVEDILOL PHOSPHATE 12.5 MILLIGRAM(S): 80 CAPSULE, EXTENDED RELEASE ORAL at 05:57

## 2019-09-15 RX ADMIN — Medication 25 MILLIGRAM(S): at 13:25

## 2019-09-15 RX ADMIN — ISOSORBIDE MONONITRATE 60 MILLIGRAM(S): 60 TABLET, EXTENDED RELEASE ORAL at 13:22

## 2019-09-15 RX ADMIN — HEPARIN SODIUM 5000 UNIT(S): 5000 INJECTION INTRAVENOUS; SUBCUTANEOUS at 05:57

## 2019-09-15 RX ADMIN — Medication 1 GRAM(S): at 05:57

## 2019-09-15 RX ADMIN — SIMETHICONE 80 MILLIGRAM(S): 80 TABLET, CHEWABLE ORAL at 05:57

## 2019-09-15 RX ADMIN — CLOPIDOGREL BISULFATE 75 MILLIGRAM(S): 75 TABLET, FILM COATED ORAL at 13:22

## 2019-09-15 RX ADMIN — PANTOPRAZOLE SODIUM 40 MILLIGRAM(S): 20 TABLET, DELAYED RELEASE ORAL at 05:57

## 2019-09-15 RX ADMIN — Medication 1000 UNIT(S): at 05:57

## 2019-09-15 RX ADMIN — Medication 25 MILLIGRAM(S): at 05:57

## 2019-09-15 NOTE — PROGRESS NOTE ADULT - ASSESSMENT
80 yo F with PMH of CABG, HTN, HLD, kidney stones     comes in for pressure-like   epigasric/ chest pain x1 week.    	Pt started to experience intermittent pressure-like   epigastric  pain along with constant "stomach bloating" x 1 week.   Pt recently saw her cardiologis/  dr suyapa jimenez/  reyes singh and underwent cath yesterday at [facility] which showed that 2 of her grafts were non-functioning and it was decided that the patient would be treated medically   . Patient decided to come in because pain has been worsening today.     very  comfortable  in er  now/ seen by gi  and card  troponin, negative/  tele   mildly elevated lipase.  which is normal now   ct abdomen, normal  /  dvt ppx  on   coreg/ plavix/ crestor/ hydralazinr/ remeron//   allergic  to asa  doing better  today, has no pain    f/p  with gi for anemia, stbale       < from: CT Abdomen and Pelvis w/ Oral Cont (09.14.19 @ 13:54) >  IMPRESSION:   No acute pathology  < end of copied text >                        Abdominal Pain: DDx: PUD, gastritis, viral gastroenteritis, cardiac ACS. CT Abd/pelvis negative for acute issues CAD s/p CABG with 2 nonpatent grafts  Hypertension   Anemia: hemodynamically stable, await outpatient colonoscopy. per  gi  -  pantoprazole 40mg po daily, 30 minutes before meals. per  gi

## 2019-09-15 NOTE — DISCHARGE NOTE NURSING/CASE MANAGEMENT/SOCIAL WORK - PATIENT PORTAL LINK FT
You can access the FollowMyHealth Patient Portal offered by [facility] by registering at the following website: [email or web address]. By joining [facility]’s FollowMyHealth portal, you will also be able to view your health information using other applications (apps) compatible with our system.

## 2019-09-15 NOTE — CHART NOTE - NSCHARTNOTEFT_GEN_A_CORE
Per Dr. Felder: Pt cleared for DC home with follow up with PCP, GI, Cards  Per GI: Pt cleared for DC with follow up with [facility] on 9/26

## 2019-09-15 NOTE — PROGRESS NOTE ADULT - SUBJECTIVE AND OBJECTIVE BOX
CARDIOLOGY     PROGRESS  NOTE   ________________________________________________    CHIEF COMPLAINT:Patient is a 79y old  Female who presents with a chief complaint of epigastric  pain (15 Sep 2019 10:03)  no complain, doing better  	  REVIEW OF SYSTEMS:  CONSTITUTIONAL: No fever, weight loss, or fatigue  EYES: No eye pain, visual disturbances, or discharge  ENT:  No difficulty hearing, tinnitus, vertigo; No sinus or throat pain  NECK: No pain or stiffness  RESPIRATORY: No cough, wheezing, chills or hemoptysis; No Shortness of Breath  CARDIOVASCULAR: No chest pain, palpitations, passing out, dizziness, or leg swelling  GASTROINTESTINAL: No abdominal or epigastric pain. No nausea, vomiting, or hematemesis; No diarrhea or constipation. No melena or hematochezia.  GENITOURINARY: No dysuria, frequency, hematuria, or incontinence  NEUROLOGICAL: No headaches, memory loss, loss of strength, numbness, or tremors  SKIN: No itching, burning, rashes, or lesions   LYMPH Nodes: No enlarged glands  ENDOCRINE: No heat or cold intolerance; No hair loss  MUSCULOSKELETAL: No joint pain or swelling; No muscle, back, or extremity pain  PSYCHIATRIC: No depression, anxiety, mood swings, or difficulty sleeping  HEME/LYMPH: No easy bruising, or bleeding gums  ALLERGY AND IMMUNOLOGIC: No hives or eczema	    [ ] All others negative	  [ ] Unable to obtain    PHYSICAL EXAM:  T(C): 37.1 (09-15-19 @ 08:30), Max: 37.3 (09-14-19 @ 19:42)  HR: 64 (09-15-19 @ 08:30) (62 - 75)  BP: 178/78 (09-15-19 @ 08:30) (176/72 - 190/71)  RR: 18 (09-15-19 @ 08:30) (17 - 18)  SpO2: 96% (09-15-19 @ 08:30) (95% - 98%)  Wt(kg): --  I&O's Summary    14 Sep 2019 07:01  -  15 Sep 2019 07:00  --------------------------------------------------------  IN: 840 mL / OUT: 0 mL / NET: 840 mL        Appearance: Normal	  HEENT:   Normal oral mucosa, PERRL, EOMI	  Lymphatic: No lymphadenopathy  Cardiovascular: Normal S1 S2, No JVD, N+murmurs, No edema  Respiratory: Lungs clear to auscultation	  Psychiatry: A & O x 3, Mood & affect appropriate  Gastrointestinal:  Soft, Non-tender, + BS	  Skin: No rashes, No ecchymoses, No cyanosis	  Neurologic: Non-focal  Extremities: Normal range of motion, No clubbing, cyanosis or edema  Vascular: Peripheral pulses palpable 2+ bilaterally    MEDICATIONS  (STANDING):  carvedilol 12.5 milliGRAM(s) Oral every 12 hours  clopidogrel Tablet 75 milliGRAM(s) Oral daily  heparin  Injectable 5000 Unit(s) SubCutaneous every 12 hours  hydrALAZINE 25 milliGRAM(s) Oral three times a day  influenza   Vaccine 0.5 milliLiter(s) IntraMuscular once  mirtazapine 30 milliGRAM(s) Oral daily  pantoprazole    Tablet 40 milliGRAM(s) Oral before breakfast  rosuvastatin 40 milliGRAM(s) Oral at bedtime  simethicone 80 milliGRAM(s) Chew two times a day  sucralfate 1 Gram(s) Oral two times a day      TELEMETRY: 	    ECG:  	  RADIOLOGY:  OTHER: 	  	  LABS:	 	    CARDIAC MARKERS:                                10.3   4.27  )-----------( 113      ( 15 Sep 2019 09:12 )             30.9     09-15    144  |  107  |  8   ----------------------------<  75  3.5   |  22  |  1.26    Ca    9.2      15 Sep 2019 06:55    TPro  7.0  /  Alb  4.3  /  TBili  0.4  /  DBili  x   /  AST  27  /  ALT  13  /  AlkPhos  47  09-14    proBNP: Serum Pro-Brain Natriuretic Peptide: 546 pg/mL (09-14 @ 00:03)    Lipid Profile: Cholesterol 144  LDL 61  HDL 66  TG 87    HgA1c:   TSH: Thyroid Stimulating Hormone, Serum: 3.19 uIU/mL (09-15 @ 09:19)    PT/INR - ( 14 Sep 2019 00:03 )   PT: 12.1 sec;   INR: 1.06 ratio         PTT - ( 14 Sep 2019 00:03 )  PTT:27.6 sec      Assessment and plan  ---------------------------  cad/htn  add imdur 60 mg daily  if increase bp will adjust meds  follow up lytes  gi follow up  plavix daily  get cardiac report from [facility]

## 2019-09-15 NOTE — DISCHARGE NOTE PROVIDER - CARE PROVIDER_API CALL
Del Moreno  Primary Care Provider  Phone: (   )    -  Fax: (   )    -  Follow Up Time:     Gastroenterologist,   Phone: (   )    -  Fax: (   )    -  Follow Up Time:     Miguel Jaramillo  Cardiologist  Phone: (   )    -  Fax: (   )    -  Follow Up Time:     Ras Ordonez  Phone: (   )    -  Fax: (   )    -  Follow Up Time:

## 2019-09-15 NOTE — DISCHARGE NOTE PROVIDER - HOSPITAL COURSE
78 y/o F with PMHx of CABG, HTN, HLD, kidney stones. Presents c/o pressure-like epigastric/chest pain x1 week. Recently saw her cardiologist/ [place]. VIRGIE [place] [place] yesterday at [facility] which showed that two of her grafts were non-functioning and it was decided that the patient would be treated medically. Patient decided to come in because pain has been worsening on day of presentation. Seen by GI: PPI and no acute intervention at this time. Pt to follow up with OP GI (adriel on 9/26) as OP for already scheduled colonoscopy and recommending endoscopy as well. Seen by cardiology: troponin negative. Mildly elevated lipase which is normal now. CT A/P with no acute pathology. Incidental: Trace left pleural effusion. A few scattered hepatic cysts. Small bilateral nonobstructing renal calculi. Small left renal cyst.    Cleared for DC by Dr. Felder with follow up with PCP, GI and cardiologist.

## 2019-09-15 NOTE — PROGRESS NOTE ADULT - SUBJECTIVE AND OBJECTIVE BOX
no  cp/  abd pain  REVIEW OF SYSTEMS:  GEN: no fever,    no chills  RESP: no SOB,   no cough  CVS: no chest pain,   no palpitations  GI: no abdominal pain,   no nausea,   no vomiting,   no constipation,   no diarrhea  : no dysuria,   no frequency  NEURO: no headache,   no dizziness  PSYCH: no depression,   not anxious  Derm : no rash    MEDICATIONS  (STANDING):  carvedilol 12.5 milliGRAM(s) Oral every 12 hours  cholecalciferol 1000 Unit(s) Oral two times a day  clopidogrel Tablet 75 milliGRAM(s) Oral daily  heparin  Injectable 5000 Unit(s) SubCutaneous every 12 hours  hydrALAZINE 25 milliGRAM(s) Oral two times a day  influenza   Vaccine 0.5 milliLiter(s) IntraMuscular once  mirtazapine 30 milliGRAM(s) Oral daily  pantoprazole    Tablet 40 milliGRAM(s) Oral before breakfast  rosuvastatin 40 milliGRAM(s) Oral at bedtime  simethicone 80 milliGRAM(s) Chew two times a day  sodium chloride 0.9%. 1000 milliLiter(s) (60 mL/Hr) IV Continuous <Continuous>  sucralfate 1 Gram(s) Oral two times a day    MEDICATIONS  (PRN):      Vital Signs Last 24 Hrs  T(C): 37.1 (15 Sep 2019 08:30), Max: 37.3 (14 Sep 2019 19:42)  T(F): 98.7 (15 Sep 2019 08:30), Max: 99.2 (14 Sep 2019 19:42)  HR: 64 (15 Sep 2019 08:30) (62 - 75)  BP: 178/78 (15 Sep 2019 08:30) (176/72 - 190/71)  BP(mean): --  RR: 18 (15 Sep 2019 08:30) (17 - 18)  SpO2: 96% (15 Sep 2019 08:30) (95% - 98%)  CAPILLARY BLOOD GLUCOSE        I&O's Summary    14 Sep 2019 07:01  -  15 Sep 2019 07:00  --------------------------------------------------------  IN: 840 mL / OUT: 0 mL / NET: 840 mL        PHYSICAL EXAM:  HEAD:  Atraumatic, Normocephalic  NECK: Supple, No   JVD  CHEST/LUNG:   no     rales,     no,    rhonchi  HEART: Regular rate and rhythm;         murmur  ABDOMEN: Soft, Nontender, ;   EXTREMITIES:     no   edema  NEUROLOGY:  alert    LABS:                        10.3   4.27  )-----------( 113      ( 15 Sep 2019 09:12 )             30.9     09-15    144  |  107  |  8   ----------------------------<  75  3.5   |  22  |  1.26    Ca    9.2      15 Sep 2019 06:55    TPro  7.0  /  Alb  4.3  /  TBili  0.4  /  DBili  x   /  AST  27  /  ALT  13  /  AlkPhos  47  09-14    PT/INR - ( 14 Sep 2019 00:03 )   PT: 12.1 sec;   INR: 1.06 ratio         PTT - ( 14 Sep 2019 00:03 )  PTT:27.6 sec            09-14 @ 15:47  3.6  58      Thyroid Stimulating Hormone, Serum: 3.19 uIU/mL (09-15 @ 09:19)          Consultant(s) Notes Reviewed:      Care Discussed with Consultants/Other Providers:

## 2019-09-15 NOTE — CHART NOTE - NSCHARTNOTEFT_GEN_A_CORE
Pt improved with carafate and PPI. No objection from GI standpoint for discharge and outpatient followup    Continue with ppi as outpatient. follow up with outpatient GI doctor for colonoscopy and possible need for EGD

## 2019-09-15 NOTE — DISCHARGE NOTE PROVIDER - NSDCCPCAREPLAN_GEN_ALL_CORE_FT
PRINCIPAL DISCHARGE DIAGNOSIS  Diagnosis: Epigastric pain  Assessment and Plan of Treatment: s/p cath day before presentation, at [facility] which showed that two of her grafts were non-functioning and it was decided that the patient would be treated medically. Patient decided to come 2/2 worsening abdominal pain on day of presentation. Seen by GI: PPI and no acute intervention at this time. Pt to follow up with OP GI (adriel on 9/26) as OP for already scheduled colonoscopy and recommending endoscopy as well. Seen by cardiology: troponin negative. Mildly elevated lipase which is normal now. CT A/P with no acute pathology. Incidental: Trace left pleural effusion. A few scattered hepatic cysts. Small bilateral nonobstructing renal calculi. Small left renal cyst.  Cleared for DC by Dr. Felder with follow up with PCP, GI and cardiologist.

## 2019-10-18 ENCOUNTER — INPATIENT (INPATIENT)
Facility: HOSPITAL | Age: 79
LOS: 2 days | Discharge: HOME | DRG: 392 | End: 2019-10-21
Attending: INTERNAL MEDICINE | Admitting: INTERNAL MEDICINE
Payer: COMMERCIAL

## 2019-10-18 VITALS
TEMPERATURE: 98 F | WEIGHT: 115.96 LBS | HEIGHT: 60 IN | RESPIRATION RATE: 22 BRPM | OXYGEN SATURATION: 100 % | DIASTOLIC BLOOD PRESSURE: 68 MMHG | SYSTOLIC BLOOD PRESSURE: 161 MMHG | HEART RATE: 59 BPM

## 2019-10-18 DIAGNOSIS — R10.13 EPIGASTRIC PAIN: ICD-10-CM

## 2019-10-18 LAB
ALBUMIN SERPL ELPH-MCNC: 4.2 G/DL — SIGNIFICANT CHANGE UP (ref 3.3–5)
ALP SERPL-CCNC: 52 U/L — SIGNIFICANT CHANGE UP (ref 40–120)
ALT FLD-CCNC: 13 U/L — SIGNIFICANT CHANGE UP (ref 10–45)
ANION GAP SERPL CALC-SCNC: 13 MMOL/L — SIGNIFICANT CHANGE UP (ref 5–17)
APPEARANCE UR: CLEAR — SIGNIFICANT CHANGE UP
APTT BLD: 28.2 SEC — SIGNIFICANT CHANGE UP (ref 27.5–36.3)
AST SERPL-CCNC: 23 U/L — SIGNIFICANT CHANGE UP (ref 10–40)
BASOPHILS # BLD AUTO: 0.01 K/UL — SIGNIFICANT CHANGE UP (ref 0–0.2)
BASOPHILS NFR BLD AUTO: 0.3 % — SIGNIFICANT CHANGE UP (ref 0–2)
BILIRUB SERPL-MCNC: 0.4 MG/DL — SIGNIFICANT CHANGE UP (ref 0.2–1.2)
BILIRUB UR-MCNC: NEGATIVE — SIGNIFICANT CHANGE UP
BUN SERPL-MCNC: 14 MG/DL — SIGNIFICANT CHANGE UP (ref 7–23)
CALCIUM SERPL-MCNC: 9.8 MG/DL — SIGNIFICANT CHANGE UP (ref 8.4–10.5)
CHLORIDE SERPL-SCNC: 108 MMOL/L — SIGNIFICANT CHANGE UP (ref 96–108)
CO2 SERPL-SCNC: 22 MMOL/L — SIGNIFICANT CHANGE UP (ref 22–31)
COLOR SPEC: SIGNIFICANT CHANGE UP
CREAT SERPL-MCNC: 1.35 MG/DL — HIGH (ref 0.5–1.3)
DIFF PNL FLD: NEGATIVE — SIGNIFICANT CHANGE UP
EOSINOPHIL # BLD AUTO: 0.04 K/UL — SIGNIFICANT CHANGE UP (ref 0–0.5)
EOSINOPHIL NFR BLD AUTO: 1.1 % — SIGNIFICANT CHANGE UP (ref 0–6)
GLUCOSE SERPL-MCNC: 99 MG/DL — SIGNIFICANT CHANGE UP (ref 70–99)
GLUCOSE UR QL: NEGATIVE — SIGNIFICANT CHANGE UP
HCT VFR BLD CALC: 29.6 % — LOW (ref 34.5–45)
HGB BLD-MCNC: 10 G/DL — LOW (ref 11.5–15.5)
INR BLD: 1.04 RATIO — SIGNIFICANT CHANGE UP (ref 0.88–1.16)
KETONES UR-MCNC: SIGNIFICANT CHANGE UP
LEUKOCYTE ESTERASE UR-ACNC: ABNORMAL
LIDOCAIN IGE QN: 60 U/L — SIGNIFICANT CHANGE UP (ref 7–60)
LYMPHOCYTES # BLD AUTO: 1.01 K/UL — SIGNIFICANT CHANGE UP (ref 1–3.3)
LYMPHOCYTES # BLD AUTO: 26.7 % — SIGNIFICANT CHANGE UP (ref 13–44)
MCHC RBC-ENTMCNC: 31.3 PG — SIGNIFICANT CHANGE UP (ref 27–34)
MCHC RBC-ENTMCNC: 33.8 GM/DL — SIGNIFICANT CHANGE UP (ref 32–36)
MCV RBC AUTO: 92.5 FL — SIGNIFICANT CHANGE UP (ref 80–100)
MONOCYTES # BLD AUTO: 0.39 K/UL — SIGNIFICANT CHANGE UP (ref 0–0.9)
MONOCYTES NFR BLD AUTO: 10.3 % — SIGNIFICANT CHANGE UP (ref 2–14)
NEUTROPHILS # BLD AUTO: 2.33 K/UL — SIGNIFICANT CHANGE UP (ref 1.8–7.4)
NEUTROPHILS NFR BLD AUTO: 61.6 % — SIGNIFICANT CHANGE UP (ref 43–77)
NITRITE UR-MCNC: NEGATIVE — SIGNIFICANT CHANGE UP
NRBC # BLD: 0 /100 WBCS — SIGNIFICANT CHANGE UP (ref 0–0)
PH UR: 6.5 — SIGNIFICANT CHANGE UP (ref 5–8)
PLATELET # BLD AUTO: 163 K/UL — SIGNIFICANT CHANGE UP (ref 150–400)
POTASSIUM SERPL-MCNC: 4 MMOL/L — SIGNIFICANT CHANGE UP (ref 3.5–5.3)
POTASSIUM SERPL-SCNC: 4 MMOL/L — SIGNIFICANT CHANGE UP (ref 3.5–5.3)
PROT SERPL-MCNC: 6.8 G/DL — SIGNIFICANT CHANGE UP (ref 6–8.3)
PROT UR-MCNC: NEGATIVE — SIGNIFICANT CHANGE UP
PROTHROM AB SERPL-ACNC: 12 SEC — SIGNIFICANT CHANGE UP (ref 10–12.9)
RBC # BLD: 3.2 M/UL — LOW (ref 3.8–5.2)
RBC # FLD: 14 % — SIGNIFICANT CHANGE UP (ref 10.3–14.5)
SODIUM SERPL-SCNC: 143 MMOL/L — SIGNIFICANT CHANGE UP (ref 135–145)
SP GR SPEC: 1.01 — SIGNIFICANT CHANGE UP (ref 1.01–1.02)
TROPONIN T, HIGH SENSITIVITY RESULT: 11 NG/L — SIGNIFICANT CHANGE UP (ref 0–51)
TROPONIN T, HIGH SENSITIVITY RESULT: 14 NG/L — SIGNIFICANT CHANGE UP (ref 0–51)
UROBILINOGEN FLD QL: NEGATIVE — SIGNIFICANT CHANGE UP
WBC # BLD: 3.78 K/UL — LOW (ref 3.8–10.5)
WBC # FLD AUTO: 3.78 K/UL — LOW (ref 3.8–10.5)

## 2019-10-18 PROCEDURE — 99285 EMERGENCY DEPT VISIT HI MDM: CPT | Mod: GC

## 2019-10-18 PROCEDURE — 71046 X-RAY EXAM CHEST 2 VIEWS: CPT | Mod: 26

## 2019-10-18 PROCEDURE — 76700 US EXAM ABDOM COMPLETE: CPT | Mod: 26

## 2019-10-18 PROCEDURE — 93010 ELECTROCARDIOGRAM REPORT: CPT

## 2019-10-18 PROCEDURE — 99223 1ST HOSP IP/OBS HIGH 75: CPT

## 2019-10-18 RX ORDER — SUCRALFATE 1 G
1 TABLET ORAL ONCE
Refills: 0 | Status: COMPLETED | OUTPATIENT
Start: 2019-10-18 | End: 2019-10-18

## 2019-10-18 RX ORDER — SODIUM CHLORIDE 9 MG/ML
1000 INJECTION INTRAMUSCULAR; INTRAVENOUS; SUBCUTANEOUS ONCE
Refills: 0 | Status: COMPLETED | OUTPATIENT
Start: 2019-10-18 | End: 2019-10-18

## 2019-10-18 RX ORDER — FAMOTIDINE 10 MG/ML
20 INJECTION INTRAVENOUS ONCE
Refills: 0 | Status: COMPLETED | OUTPATIENT
Start: 2019-10-18 | End: 2019-10-18

## 2019-10-18 RX ORDER — ONDANSETRON 8 MG/1
4 TABLET, FILM COATED ORAL ONCE
Refills: 0 | Status: COMPLETED | OUTPATIENT
Start: 2019-10-18 | End: 2019-10-18

## 2019-10-18 RX ORDER — ACETAMINOPHEN 500 MG
1000 TABLET ORAL ONCE
Refills: 0 | Status: COMPLETED | OUTPATIENT
Start: 2019-10-18 | End: 2019-10-18

## 2019-10-18 RX ADMIN — Medication 400 MILLIGRAM(S): at 23:08

## 2019-10-18 RX ADMIN — SODIUM CHLORIDE 1000 MILLILITER(S): 9 INJECTION INTRAMUSCULAR; INTRAVENOUS; SUBCUTANEOUS at 23:08

## 2019-10-18 RX ADMIN — Medication 1 GRAM(S): at 22:15

## 2019-10-18 RX ADMIN — Medication 30 MILLILITER(S): at 20:07

## 2019-10-18 RX ADMIN — ONDANSETRON 4 MILLIGRAM(S): 8 TABLET, FILM COATED ORAL at 22:15

## 2019-10-18 RX ADMIN — FAMOTIDINE 20 MILLIGRAM(S): 10 INJECTION INTRAVENOUS at 20:07

## 2019-10-18 NOTE — ED PROVIDER NOTE - NS ED ROS FT
General: denies fever, chills  HENT: denies nasal congestion, rhinorrhea  Eyes: denies visual changes, blurred vision  CV: denies chest pain, palpitations  Resp: denies difficulty breathing, cough  Abdominal: + nausea, + abdominal pain  MSK: denies muscle aches, leg swelling  Neuro: denies headaches, numbness, tingling  Skin: denies rashes, bruises

## 2019-10-18 NOTE — ED PROVIDER NOTE - ATTENDING CONTRIBUTION TO CARE
attending Scotty: 79yF h/o CAD, HLD, HTN, renal stones, pre-DM p/w epigastric and RUQ pain with difficulty tolerating PO. In hospital last month for cardiac workup. Scheduled for upper endoscopy next week. Will obtain labs, ekg, RUQ US eval gallbladder, reassess.

## 2019-10-18 NOTE — ED PROVIDER NOTE - PHYSICAL EXAMINATION
CONSTITUTIONAL: NAD, awake, alert  HEAD: Normocephalic; atraumatic  EYES: EOMI, no nystagmus  ENMT: External appears normal, MMM  CARD: Normal Sl, S2; no audible murmurs  RESP: normal wob, lungs ctab  ABD: soft, non-distended; RUQ and epigastric tenderness   MSK: no edema, normal ROM in all four extremities  SKIN: Warm, dry, no rashes  NEURO: aaox3, moving all extremities spontaneously

## 2019-10-18 NOTE — H&P ADULT - PROBLEM SELECTOR PLAN 2
- protonix IV  - needs GI consult in AM  - consider inpt EGD - protonix IV  - needs GI consult in AM  - consider inpt EGD  - CT 1 mo ago was grossly normal. ultrasound without explanation of abd pain

## 2019-10-18 NOTE — ED ADULT NURSE NOTE - OBJECTIVE STATEMENT
Female 79 years old with PMHx of stent came in for abdominal cramping and distention. Pt reports she was diagnosed with Bronchitis Oct8, and was started on Cefdinir and Promethazone. Five days after taking meds she develop abdominal cramping and feeling "bloatedness" associated with soft stool three times a day, nausea and chills. Denies fever or urinary symptoms. Denies chest pain or sob. Denies urinary symptoms. All labs obtained. Will continue to reassess.

## 2019-10-18 NOTE — ED ADULT TRIAGE NOTE - CHIEF COMPLAINT QUOTE
abd cramping, soft bms, recently stopped Cefdinir given for uri sx, has given stool sample to r/o c-diff, results pending, lightheaded

## 2019-10-18 NOTE — H&P ADULT - NSHPLABSRESULTS_GEN_ALL_CORE
Personally reviewed labs.   Personally reviewed imaging.   Personally reviewed EKG. Sinus bridget rate 49, . Q wave in III/V3. No ST or TW changes.                           10.0   3.78  )-----------( 163      ( 18 Oct 2019 17:50 )             29.6       10    143  |  108  |  14  ----------------------------<  99  4.0   |  22  |  1.35<H>    Ca    9.8      18 Oct 2019 17:50    TPro  6.8  /  Alb  4.2  /  TBili  0.4  /  DBili  x   /  AST  23  /  ALT  13  /  AlkPhos  52  10-18            LIVER FUNCTIONS - ( 18 Oct 2019 17:50 )  Alb: 4.2 g/dL / Pro: 6.8 g/dL / ALK PHOS: 52 U/L / ALT: 13 U/L / AST: 23 U/L / GGT: x             PT/INR - ( 18 Oct 2019 17:50 )   PT: 12.0 sec;   INR: 1.04 ratio         PTT - ( 18 Oct 2019 17:50 )  PTT:28.2 sec    Urinalysis Basic - ( 18 Oct 2019 20:46 )    Color: Light Yellow / Appearance: Clear / S.012 / pH: x  Gluc: x / Ketone: Trace  / Bili: Negative / Urobili: Negative   Blood: x / Protein: Negative / Nitrite: Negative   Leuk Esterase: Large / RBC: 1 /hpf / WBC 7 /HPF   Sq Epi: x / Non Sq Epi: 1 /hpf / Bacteria: Negative

## 2019-10-18 NOTE — ED ADULT NURSE NOTE - CHPI ED NUR SYMPTOMS NEG
no vomiting/no diarrhea/no abdominal distension/no dysuria/no fever/no blood in stool/no chills/no burning urination

## 2019-10-18 NOTE — H&P ADULT - NSHPPHYSICALEXAM_GEN_ALL_CORE
PHYSICAL EXAM:   GENERAL: Alert. Not confused. Mild distress 2/2 pain. Not thin. Not cachectic. Not obese.  HEAD:  Atraumatic. Normocephalic.  EYES: EOMI. PERRLA. Normal conjunctiva/sclera.  ENT: Neck supple. No JVD. Moist oral mucosa. Not edentulous. No thrush.  LYMPH: Normal supraclavicular/cervical lymph nodes.   CARDIAC: Not tachy, Not bridget. Regular rhythm. Not irregularly irregular. S1. S2. No murmur. No rub. No distant heart sounds.  LUNG/CHEST: CTAB. BS equal bilaterally. No wheezes. No rales. No rhonchi.  ABDOMEN: Soft. Mild epigastric tenderness. No distension. No fluid wave. Normal bowel sounds.  BACK: No midline/vertebral tenderness. No flank tenderness.  VASCULAR: +2 b/l radial or ulnar pulses. Palpable DP pulses.  EXTREMITIES:  No clubbing. No cyanosis. No edema. Moving all 4.  NEUROLOGY: A&Ox3. Non-focal exam. Cranial nerves intact. Normal speech. Sensation intact.  PSYCH: Normal behavior. Normal affect.  SKIN: No jaundice. No erythema. No rash/lesion.  Vascular Access:     ICU Vital Signs Last 24 Hrs  T(C): 36.9 (18 Oct 2019 20:04), Max: 36.9 (18 Oct 2019 20:04)  T(F): 98.4 (18 Oct 2019 20:04), Max: 98.4 (18 Oct 2019 20:04)  HR: 59 (18 Oct 2019 20:04) (59 - 62)  BP: 176/55 (18 Oct 2019 20:04) (126/57 - 176/55)  BP(mean): --  ABP: --  ABP(mean): --  RR: 17 (18 Oct 2019 20:04) (17 - 22)  SpO2: 97% (18 Oct 2019 20:04) (97% - 100%)      I&O's Summary

## 2019-10-18 NOTE — H&P ADULT - PROBLEM SELECTOR PLAN 3
- known obstructive disease with ?graft occlusion  - cont maximal medical therapy  - will need to obtain record of LHC from 1 mo ago  - pt allergic to asa  - consider different 2nd antiplatelet

## 2019-10-18 NOTE — H&P ADULT - NSHPSOCIALHISTORY_GEN_ALL_CORE
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

## 2019-10-18 NOTE — H&P ADULT - PROBLEM SELECTOR PLAN 1
- cont plavix, lipitor, coreg, imdur, hydral  - tele  - TTE  - trend CE  - card consult in AM, prev seen by Dr. Gary last hospitalization  - hold off hep gtt - cont plavix, lipitor, coreg, imdur, hydral  - tele  - TTE  - trend CE  - card consult in AM, prev seen by Dr. Gary last hospitalization  - hold off hep gtt  - morphine prn for pain

## 2019-10-18 NOTE — H&P ADULT - NSHPREVIEWOFSYSTEMS_GEN_ALL_CORE
REVIEW OF SYSTEMS:  CONSTITUTIONAL: No weakness. No fevers. +chills. No rigors. No weight loss. No night sweats. +poor appetite.  EYES: No visual changes. No eye pain.  ENT: No hearing difficulty. No vertigo. No dysphagia. No sore throat. +Sinusitis/rhinorrhea.   NECK: No pain. No stiffness/rigidity.  CARDIAC: +chest pain. No palpitations. No lightheadedness.  RESPIRATORY: No cough. +SOB. No hemoptysis. +chest congestion  GASTROINTESTINAL: +abdominal pain. No nausea. No vomiting. No hematemesis. No diarrhea. No constipation. No melena. No hematochezia.  GENITOURINARY: No dysuria. No frequency. No hesitancy. No hematuria. No oliguria.  NEUROLOGICAL: No numbness/tingling. No focal weakness. No urinary or fecal incontinence. No headache. No unsteady gait.  BACK: No back pain. No flank pain.  EXTREMITIES: No lower extremity edema. Full ROM. No joint pain.  SKIN: No rashes. No itching. No other lesions.  PSYCHIATRIC: No depression. No anxiety. No SI/HI.  ALLERGIC: No lip swelling. No hives.  All other review of systems is negative unless indicated above.  Unless indicated above, unable to assess ROS 2/2

## 2019-10-18 NOTE — H&P ADULT - HISTORY OF PRESENT ILLNESS
79F c hx CAD s/p stent and 3vCABG, HTN, HLD, kidney stones, CKD, pw 6 days of epigastric/chest pain.    Pt was recently admitted 9/14-15 for the same symptoms. Pt states that after her discharge, she was feeling well and is scheduled to get an EGD on 10/29 as outpt. About 1.5 weeks ago, pt started to have upper respiratory symptoms, so was started on antibiotics, including erythromycin. After taking the antibiotics for 5 days, pt started to get severe epigastric cramping, usually worse after eating. Pt self-tapered the antibiotics, and for the past 6 days, has been unable to tolerate any PO food. Pt reports she gets epigastric/chest pressure that lasts between 5 and 45 minutes, usually occurs a couple times a day, and "comes in waves". Associated with these symptoms is chills, tingling in her b/l feet, and "gas" in her belly causing belching. Pt states that the chest pain/abd pain is mildly worse with deep inspiration, and does not seem to be affected by exertion. Pt states she had a LHC on around 9/13/19 @ [facility] in the city, result of which was that she has a blockage in "1 or 2 of the grafts", and that she is being treated medically for this. Pt states the carafate, pepcid, and maalox didn't seem to help. Pt went to see her GI doctor yesterday, who sent stool culture and cdiff.    VS: Tm 98.4, P 59, /57, R 22, 97% RA  In the Ed, received pepcid, maalox, carafate, zofran, tylenol

## 2019-10-19 DIAGNOSIS — R10.13 EPIGASTRIC PAIN: ICD-10-CM

## 2019-10-19 DIAGNOSIS — I20.0 UNSTABLE ANGINA: ICD-10-CM

## 2019-10-19 DIAGNOSIS — I10 ESSENTIAL (PRIMARY) HYPERTENSION: ICD-10-CM

## 2019-10-19 DIAGNOSIS — N18.9 CHRONIC KIDNEY DISEASE, UNSPECIFIED: ICD-10-CM

## 2019-10-19 DIAGNOSIS — I25.700 ATHEROSCLEROSIS OF CORONARY ARTERY BYPASS GRAFT(S), UNSPECIFIED, WITH UNSTABLE ANGINA PECTORIS: ICD-10-CM

## 2019-10-19 LAB
ALBUMIN SERPL ELPH-MCNC: 4.1 G/DL — SIGNIFICANT CHANGE UP (ref 3.3–5)
ALP SERPL-CCNC: 53 U/L — SIGNIFICANT CHANGE UP (ref 40–120)
ALT FLD-CCNC: 15 U/L — SIGNIFICANT CHANGE UP (ref 10–45)
AMYLASE P1 CFR SERPL: 143 U/L — HIGH (ref 25–125)
ANION GAP SERPL CALC-SCNC: 11 MMOL/L — SIGNIFICANT CHANGE UP (ref 5–17)
AST SERPL-CCNC: 26 U/L — SIGNIFICANT CHANGE UP (ref 10–40)
BASOPHILS # BLD AUTO: 0.01 K/UL — SIGNIFICANT CHANGE UP (ref 0–0.2)
BASOPHILS NFR BLD AUTO: 0.3 % — SIGNIFICANT CHANGE UP (ref 0–2)
BILIRUB SERPL-MCNC: 0.4 MG/DL — SIGNIFICANT CHANGE UP (ref 0.2–1.2)
BUN SERPL-MCNC: 13 MG/DL — SIGNIFICANT CHANGE UP (ref 7–23)
CALCIUM SERPL-MCNC: 9.5 MG/DL — SIGNIFICANT CHANGE UP (ref 8.4–10.5)
CHLORIDE SERPL-SCNC: 106 MMOL/L — SIGNIFICANT CHANGE UP (ref 96–108)
CHOLEST SERPL-MCNC: 156 MG/DL — SIGNIFICANT CHANGE UP (ref 10–199)
CK MB CFR SERPL CALC: 1.3 NG/ML — SIGNIFICANT CHANGE UP (ref 0–3.8)
CK SERPL-CCNC: 71 U/L — SIGNIFICANT CHANGE UP (ref 25–170)
CO2 SERPL-SCNC: 21 MMOL/L — LOW (ref 22–31)
CREAT SERPL-MCNC: 1.38 MG/DL — HIGH (ref 0.5–1.3)
EOSINOPHIL # BLD AUTO: 0.04 K/UL — SIGNIFICANT CHANGE UP (ref 0–0.5)
EOSINOPHIL NFR BLD AUTO: 1.1 % — SIGNIFICANT CHANGE UP (ref 0–6)
GLUCOSE SERPL-MCNC: 95 MG/DL — SIGNIFICANT CHANGE UP (ref 70–99)
HBA1C BLD-MCNC: 5.5 % — SIGNIFICANT CHANGE UP (ref 4–5.6)
HCT VFR BLD CALC: 29.3 % — LOW (ref 34.5–45)
HDLC SERPL-MCNC: 66 MG/DL — SIGNIFICANT CHANGE UP
HGB BLD-MCNC: 10.1 G/DL — LOW (ref 11.5–15.5)
IMM GRANULOCYTES NFR BLD AUTO: 0.3 % — SIGNIFICANT CHANGE UP (ref 0–1.5)
LIPID PNL WITH DIRECT LDL SERPL: 68 MG/DL — SIGNIFICANT CHANGE UP
LYMPHOCYTES # BLD AUTO: 0.99 K/UL — LOW (ref 1–3.3)
LYMPHOCYTES # BLD AUTO: 27.6 % — SIGNIFICANT CHANGE UP (ref 13–44)
MAGNESIUM SERPL-MCNC: 2.3 MG/DL — SIGNIFICANT CHANGE UP (ref 1.6–2.6)
MCHC RBC-ENTMCNC: 31.7 PG — SIGNIFICANT CHANGE UP (ref 27–34)
MCHC RBC-ENTMCNC: 34.5 GM/DL — SIGNIFICANT CHANGE UP (ref 32–36)
MCV RBC AUTO: 91.8 FL — SIGNIFICANT CHANGE UP (ref 80–100)
MONOCYTES # BLD AUTO: 0.41 K/UL — SIGNIFICANT CHANGE UP (ref 0–0.9)
MONOCYTES NFR BLD AUTO: 11.4 % — SIGNIFICANT CHANGE UP (ref 2–14)
NEUTROPHILS # BLD AUTO: 2.13 K/UL — SIGNIFICANT CHANGE UP (ref 1.8–7.4)
NEUTROPHILS NFR BLD AUTO: 59.3 % — SIGNIFICANT CHANGE UP (ref 43–77)
NRBC # BLD: 0 /100 WBCS — SIGNIFICANT CHANGE UP (ref 0–0)
PHOSPHATE SERPL-MCNC: 2.9 MG/DL — SIGNIFICANT CHANGE UP (ref 2.5–4.5)
PLATELET # BLD AUTO: 153 K/UL — SIGNIFICANT CHANGE UP (ref 150–400)
POTASSIUM SERPL-MCNC: 3.6 MMOL/L — SIGNIFICANT CHANGE UP (ref 3.5–5.3)
POTASSIUM SERPL-SCNC: 3.6 MMOL/L — SIGNIFICANT CHANGE UP (ref 3.5–5.3)
PROT SERPL-MCNC: 6.8 G/DL — SIGNIFICANT CHANGE UP (ref 6–8.3)
RBC # BLD: 3.19 M/UL — LOW (ref 3.8–5.2)
RBC # FLD: 13.9 % — SIGNIFICANT CHANGE UP (ref 10.3–14.5)
SODIUM SERPL-SCNC: 138 MMOL/L — SIGNIFICANT CHANGE UP (ref 135–145)
TOTAL CHOLESTEROL/HDL RATIO MEASUREMENT: 2.4 RATIO — LOW (ref 3.3–7.1)
TRIGL SERPL-MCNC: 109 MG/DL — SIGNIFICANT CHANGE UP (ref 10–149)
TROPONIN T, HIGH SENSITIVITY RESULT: 15 NG/L — SIGNIFICANT CHANGE UP (ref 0–51)
TSH SERPL-MCNC: 3.9 UIU/ML — SIGNIFICANT CHANGE UP (ref 0.27–4.2)
WBC # BLD: 3.59 K/UL — LOW (ref 3.8–10.5)
WBC # FLD AUTO: 3.59 K/UL — LOW (ref 3.8–10.5)

## 2019-10-19 PROCEDURE — 93306 TTE W/DOPPLER COMPLETE: CPT | Mod: 26

## 2019-10-19 RX ORDER — ACETAMINOPHEN 500 MG
1000 TABLET ORAL ONCE
Refills: 0 | Status: COMPLETED | OUTPATIENT
Start: 2019-10-19 | End: 2019-10-19

## 2019-10-19 RX ORDER — ACETAMINOPHEN 500 MG
650 TABLET ORAL EVERY 8 HOURS
Refills: 0 | Status: DISCONTINUED | OUTPATIENT
Start: 2019-10-19 | End: 2019-10-21

## 2019-10-19 RX ORDER — ACETAMINOPHEN 500 MG
650 TABLET ORAL ONCE
Refills: 0 | Status: DISCONTINUED | OUTPATIENT
Start: 2019-10-19 | End: 2019-10-19

## 2019-10-19 RX ORDER — INFLUENZA VIRUS VACCINE 15; 15; 15; 15 UG/.5ML; UG/.5ML; UG/.5ML; UG/.5ML
0.5 SUSPENSION INTRAMUSCULAR ONCE
Refills: 0 | Status: DISCONTINUED | OUTPATIENT
Start: 2019-10-19 | End: 2019-10-21

## 2019-10-19 RX ORDER — FUROSEMIDE 40 MG
20 TABLET ORAL DAILY
Refills: 0 | Status: DISCONTINUED | OUTPATIENT
Start: 2019-10-19 | End: 2019-10-21

## 2019-10-19 RX ORDER — MORPHINE SULFATE 50 MG/1
2 CAPSULE, EXTENDED RELEASE ORAL EVERY 6 HOURS
Refills: 0 | Status: DISCONTINUED | OUTPATIENT
Start: 2019-10-19 | End: 2019-10-19

## 2019-10-19 RX ORDER — SUCRALFATE 1 G
1 TABLET ORAL
Refills: 0 | Status: DISCONTINUED | OUTPATIENT
Start: 2019-10-19 | End: 2019-10-21

## 2019-10-19 RX ORDER — ATORVASTATIN CALCIUM 80 MG/1
80 TABLET, FILM COATED ORAL AT BEDTIME
Refills: 0 | Status: DISCONTINUED | OUTPATIENT
Start: 2019-10-19 | End: 2019-10-19

## 2019-10-19 RX ORDER — MORPHINE SULFATE 50 MG/1
2 CAPSULE, EXTENDED RELEASE ORAL EVERY 4 HOURS
Refills: 0 | Status: DISCONTINUED | OUTPATIENT
Start: 2019-10-19 | End: 2019-10-19

## 2019-10-19 RX ORDER — PANTOPRAZOLE SODIUM 20 MG/1
40 TABLET, DELAYED RELEASE ORAL DAILY
Refills: 0 | Status: DISCONTINUED | OUTPATIENT
Start: 2019-10-19 | End: 2019-10-19

## 2019-10-19 RX ORDER — HYDRALAZINE HCL 50 MG
25 TABLET ORAL
Refills: 0 | Status: DISCONTINUED | OUTPATIENT
Start: 2019-10-19 | End: 2019-10-21

## 2019-10-19 RX ORDER — ISOSORBIDE MONONITRATE 60 MG/1
60 TABLET, EXTENDED RELEASE ORAL DAILY
Refills: 0 | Status: DISCONTINUED | OUTPATIENT
Start: 2019-10-19 | End: 2019-10-21

## 2019-10-19 RX ORDER — CARVEDILOL PHOSPHATE 80 MG/1
1 CAPSULE, EXTENDED RELEASE ORAL
Qty: 0 | Refills: 0 | DISCHARGE

## 2019-10-19 RX ORDER — IPRATROPIUM/ALBUTEROL SULFATE 18-103MCG
3 AEROSOL WITH ADAPTER (GRAM) INHALATION ONCE
Refills: 0 | Status: COMPLETED | OUTPATIENT
Start: 2019-10-19 | End: 2019-10-19

## 2019-10-19 RX ORDER — ROSUVASTATIN CALCIUM 5 MG/1
40 TABLET ORAL AT BEDTIME
Refills: 0 | Status: DISCONTINUED | OUTPATIENT
Start: 2019-10-19 | End: 2019-10-21

## 2019-10-19 RX ORDER — CARVEDILOL PHOSPHATE 80 MG/1
25 CAPSULE, EXTENDED RELEASE ORAL EVERY 12 HOURS
Refills: 0 | Status: DISCONTINUED | OUTPATIENT
Start: 2019-10-19 | End: 2019-10-21

## 2019-10-19 RX ORDER — CLOPIDOGREL BISULFATE 75 MG/1
75 TABLET, FILM COATED ORAL DAILY
Refills: 0 | Status: DISCONTINUED | OUTPATIENT
Start: 2019-10-19 | End: 2019-10-21

## 2019-10-19 RX ORDER — SUCRALFATE 1 G
1 TABLET ORAL
Refills: 0 | Status: DISCONTINUED | OUTPATIENT
Start: 2019-10-19 | End: 2019-10-19

## 2019-10-19 RX ORDER — MIRTAZAPINE 45 MG/1
30 TABLET, ORALLY DISINTEGRATING ORAL AT BEDTIME
Refills: 0 | Status: DISCONTINUED | OUTPATIENT
Start: 2019-10-19 | End: 2019-10-21

## 2019-10-19 RX ORDER — LORATADINE 10 MG/1
10 TABLET ORAL DAILY
Refills: 0 | Status: DISCONTINUED | OUTPATIENT
Start: 2019-10-19 | End: 2019-10-21

## 2019-10-19 RX ADMIN — ROSUVASTATIN CALCIUM 40 MILLIGRAM(S): 5 TABLET ORAL at 23:44

## 2019-10-19 RX ADMIN — CARVEDILOL PHOSPHATE 25 MILLIGRAM(S): 80 CAPSULE, EXTENDED RELEASE ORAL at 18:04

## 2019-10-19 RX ADMIN — ISOSORBIDE MONONITRATE 60 MILLIGRAM(S): 60 TABLET, EXTENDED RELEASE ORAL at 11:07

## 2019-10-19 RX ADMIN — Medication 1 GRAM(S): at 11:04

## 2019-10-19 RX ADMIN — Medication 200 MILLIGRAM(S): at 21:34

## 2019-10-19 RX ADMIN — MIRTAZAPINE 30 MILLIGRAM(S): 45 TABLET, ORALLY DISINTEGRATING ORAL at 21:34

## 2019-10-19 RX ADMIN — PANTOPRAZOLE SODIUM 40 MILLIGRAM(S): 20 TABLET, DELAYED RELEASE ORAL at 01:12

## 2019-10-19 RX ADMIN — Medication 3 MILLILITER(S): at 13:49

## 2019-10-19 RX ADMIN — Medication 25 MILLIGRAM(S): at 18:04

## 2019-10-19 RX ADMIN — MIRTAZAPINE 30 MILLIGRAM(S): 45 TABLET, ORALLY DISINTEGRATING ORAL at 01:11

## 2019-10-19 RX ADMIN — Medication 400 MILLIGRAM(S): at 20:02

## 2019-10-19 RX ADMIN — Medication 1 GRAM(S): at 18:04

## 2019-10-19 RX ADMIN — CLOPIDOGREL BISULFATE 75 MILLIGRAM(S): 75 TABLET, FILM COATED ORAL at 11:07

## 2019-10-19 NOTE — PROVIDER CONTACT NOTE (OTHER) - ACTION/TREATMENT ORDERED:
CATERINA King aware. No further instruction at this time. Will continue to monitor. Safety maintained

## 2019-10-19 NOTE — PROGRESS NOTE ADULT - ASSESSMENT
78 yo F     PMH of CABG,   HTN, HLD, kidney stones     comes in for pressure-like   epigastric  chest pain x1 week.  s/p  similar  presentation  on last  admisssion    	Pt started to experience intermittent pressure-like   epigastric  pain along with constant "stomach bloating" x 1 week.   Pt 's  cardiologis/  dr suyapa jimenez/    s/p  cath at  [facility], 9/ 19,  which showed that 2 of her grafts were non-functioning and it was decided that the patient would be treated medically   . Patient decided to come in because pain has been worsening /  abd  exam is  non tender  troponin  negative   gi  eval   card  will see pt in am      home  meds,  coreg/ plavix/ crestor/ hydralazinr/ remeron//     Allergic  to as      < from: CT Abdomen and Pelvis w/ Oral Cont (09.14.19 @ 13:54) >  IMPRESSION:   No acute pathology  < end of copied text > 80 yo F     PMH of CABG,   HTN, HLD, kidney stones     comes in for pressure-like   epigastric  chest pain x1 week.,  after  completing day  5 of a ntibiotics  for  uri  s/p  similar  presentation  on last  admisssion    	Pt started to experience intermittent pressure-like   epigastric  pain along with constant "stomach bloating" x 1 week.   Pt 's  cardiologis/  dr suyapa jimenez/    s/p  cath at  [facility], 9/ 19,  which showed that 2 of her grafts were non-functioning and it was decided that the patient would be treated medically   . Patient decided to come in because pain has been worsening /  abd  exam is  non tender  troponin  negative   gi  eval   card  will see pt in am/ tele,  nsr./pvc;s  pt refusing morphine/  only wants tylenol      home  meds,  coreg/ plavix/ crestor/ hydralazinr/ remeron//     Allergic  to as      < from: CT Abdomen and Pelvis w/ Oral Cont (09.14.19 @ 13:54) >  IMPRESSION:   No acute pathology  < end of copied text >

## 2019-10-19 NOTE — PROVIDER CONTACT NOTE (OTHER) - SITUATION
On cardiac monitor pt Rhythm Sinus Nader HR 48 for 1 second. Pt. denies chest pain Sob, distress or discomfort

## 2019-10-20 LAB
ANION GAP SERPL CALC-SCNC: 13 MMOL/L — SIGNIFICANT CHANGE UP (ref 5–17)
BUN SERPL-MCNC: 11 MG/DL — SIGNIFICANT CHANGE UP (ref 7–23)
CALCIUM SERPL-MCNC: 9.4 MG/DL — SIGNIFICANT CHANGE UP (ref 8.4–10.5)
CHLORIDE SERPL-SCNC: 108 MMOL/L — SIGNIFICANT CHANGE UP (ref 96–108)
CO2 SERPL-SCNC: 23 MMOL/L — SIGNIFICANT CHANGE UP (ref 22–31)
CREAT SERPL-MCNC: 1.36 MG/DL — HIGH (ref 0.5–1.3)
GLUCOSE SERPL-MCNC: 82 MG/DL — SIGNIFICANT CHANGE UP (ref 70–99)
HCT VFR BLD CALC: 29.7 % — LOW (ref 34.5–45)
HGB BLD-MCNC: 10.1 G/DL — LOW (ref 11.5–15.5)
MCHC RBC-ENTMCNC: 31.8 PG — SIGNIFICANT CHANGE UP (ref 27–34)
MCHC RBC-ENTMCNC: 34 GM/DL — SIGNIFICANT CHANGE UP (ref 32–36)
MCV RBC AUTO: 93.4 FL — SIGNIFICANT CHANGE UP (ref 80–100)
NRBC # BLD: 0 /100 WBCS — SIGNIFICANT CHANGE UP (ref 0–0)
PLATELET # BLD AUTO: 155 K/UL — SIGNIFICANT CHANGE UP (ref 150–400)
POTASSIUM SERPL-MCNC: 3.7 MMOL/L — SIGNIFICANT CHANGE UP (ref 3.5–5.3)
POTASSIUM SERPL-SCNC: 3.7 MMOL/L — SIGNIFICANT CHANGE UP (ref 3.5–5.3)
RBC # BLD: 3.18 M/UL — LOW (ref 3.8–5.2)
RBC # FLD: 14.1 % — SIGNIFICANT CHANGE UP (ref 10.3–14.5)
SODIUM SERPL-SCNC: 144 MMOL/L — SIGNIFICANT CHANGE UP (ref 135–145)
WBC # BLD: 3.73 K/UL — LOW (ref 3.8–10.5)
WBC # FLD AUTO: 3.73 K/UL — LOW (ref 3.8–10.5)

## 2019-10-20 RX ORDER — SIMETHICONE 80 MG/1
80 TABLET, CHEWABLE ORAL
Refills: 0 | Status: DISCONTINUED | OUTPATIENT
Start: 2019-10-20 | End: 2019-10-21

## 2019-10-20 RX ADMIN — MIRTAZAPINE 30 MILLIGRAM(S): 45 TABLET, ORALLY DISINTEGRATING ORAL at 22:18

## 2019-10-20 RX ADMIN — Medication 25 MILLIGRAM(S): at 05:56

## 2019-10-20 RX ADMIN — CLOPIDOGREL BISULFATE 75 MILLIGRAM(S): 75 TABLET, FILM COATED ORAL at 11:48

## 2019-10-20 RX ADMIN — CARVEDILOL PHOSPHATE 25 MILLIGRAM(S): 80 CAPSULE, EXTENDED RELEASE ORAL at 05:56

## 2019-10-20 RX ADMIN — ISOSORBIDE MONONITRATE 60 MILLIGRAM(S): 60 TABLET, EXTENDED RELEASE ORAL at 11:48

## 2019-10-20 RX ADMIN — SIMETHICONE 80 MILLIGRAM(S): 80 TABLET, CHEWABLE ORAL at 18:57

## 2019-10-20 RX ADMIN — ROSUVASTATIN CALCIUM 40 MILLIGRAM(S): 5 TABLET ORAL at 22:18

## 2019-10-20 RX ADMIN — Medication 25 MILLIGRAM(S): at 17:33

## 2019-10-20 RX ADMIN — CARVEDILOL PHOSPHATE 25 MILLIGRAM(S): 80 CAPSULE, EXTENDED RELEASE ORAL at 17:33

## 2019-10-20 RX ADMIN — Medication 200 MILLIGRAM(S): at 23:32

## 2019-10-20 NOTE — CONSULT NOTE ADULT - ASSESSMENT
Unclear etiology of UGI symptoms, pt clinically well now, mildly elevated amylase non specific in setting of normal lipase  maybe side effect of multiple medications and erythro given recently  doubt biliary source  she is scheduled for outpt EGD next week  observe today, d/c tomorrow if doing well for outpt EGD, if symptoms recur then would plan inpt EGD and possible HIDA  PPI therapy, diet as tolerates

## 2019-10-20 NOTE — PROGRESS NOTE ADULT - ASSESSMENT
78 yo F     PMH of CABG,   HTN, HLD, kidney stones     comes in for pressure-like   epigastric  chest pain x1 week.,  after  completing day  5 of a ntibiotics  for  uri  s/p  similar  presentation  on last  admisssion    	Pt started to experience intermittent pressure-like   epigastric  pain along with constant "stomach bloating" x 1 week.   Pt 's  cardiologis/  dr suyapa jimenez/    s/p  cath at  [facility], 9/ 19,  which showed that 2 of her grafts were non-functioning and it was decided that the patient would be treated medically   . Patient decided to come in because pain has been worsening /  abd  exam is  non tender  troponin  negative   gi  eval   card  will see pt / dr art    tele,  nsr./pvc;s  pt refusing morphine/  only wants tylenol      home  meds,  coreg/ plavix/ crestor/ hydralazinr/ remeron//     Allergic  to asa  echo noted,  hypokinesis of inferno   basal  and  septal  walls/   with diastolic  dysfunction      < from: CT Abdomen and Pelvis w/ Oral Cont (09.14.19 @ 13:54) >  IMPRESSION:   No acute pathology  < end of copied text >     < from: Transthoracic Echocardiogram (10.19.19 @ 08:23) >  onclusions:  1. Posterior mitral annular calcification, otherwise normal  mitral valve. Mild mitral regurgitation. Mean transmitral  valve gradient equals 4 mm Hg, consistent with mild mitral  stenosis. (HRabout 60s bpm)  2. Calcified trileaflet aortic valve with normal opening.  Minimal aortic regurgitation.  3. Overall preserved left ventricular systolic function  with mild segmental wall motion abnormalities. The basal  inferior and basal inferoseptal segments are akinetic.  Remaining segmentsare normal/hyperkinetic.  4. Moderate diastolic dysfunction (Stage II).  5. Normal right ventricular size and function.  *** No previous Echo exam.    < end of copied text > 80 yo F     PMH of CABG,   HTN, HLD, kidney stones     comes in for pressure-like   epigastric  chest pain x1 week.,  after  completing day  5 of a ntibiotics  for  uri  s/p  similar  presentation  on last  admisssion    	Pt started to experience intermittent pressure-like   epigastric  pain along with constant "stomach bloating" x 1 week.   Pt 's  cardiologis/  dr suyapa jimenez/    s/p  cath at  [facility], 9/ 19,  which showed that 2 of her grafts were non-functioning and it was decided that the patient would be treated medically   . Patient decided to come in because pain has been worsening /  abd  exam is  non tender  troponin  negative   gi  eval   card  will see pt / dr art    tele,  nsr./pvc;s  pt refusing morphine/  only wants tylenol      home  meds,  coreg/ plavix/ crestor/ hydralazinr/ remeron//     Allergic  to asa  echo noted,  hypokinesis of inferno   basal  and  septal  walls/   with diastolic  dysfunction  had bowel   movements/  doing better      < from: CT Abdomen and Pelvis w/ Oral Cont (09.14.19 @ 13:54) >  IMPRESSION:   No acute pathology  < end of copied text >     < from: Transthoracic Echocardiogram (10.19.19 @ 08:23) >  onclusions:  1. Posterior mitral annular calcification, otherwise normal  mitral valve. Mild mitral regurgitation. Mean transmitral  valve gradient equals 4 mm Hg, consistent with mild mitral  stenosis. (HRabout 60s bpm)  2. Calcified trileaflet aortic valve with normal opening.  Minimal aortic regurgitation.  3. Overall preserved left ventricular systolic function  with mild segmental wall motion abnormalities. The basal  inferior and basal inferoseptal segments are akinetic.  Remaining segmentsare normal/hyperkinetic.  4. Moderate diastolic dysfunction (Stage II).  5. Normal right ventricular size and function.  *** No previous Echo exam.    < end of copied text >

## 2019-10-20 NOTE — CONSULT NOTE ADULT - SUBJECTIVE AND OBJECTIVE BOX
Patient is a 79y Female     Patient is a 79y old  Female who presents with a chief complaint of chest/epigastric pain (20 Oct 2019 10:35)      HPI:  79F c hx CAD s/p stent and 3vCABG, HTN, HLD, kidney stones, CKD, pw 6 days of epigastric/chest pain.    Pt was recently admitted 9/14-15 for the same symptoms. Pt states that after her discharge, she was feeling well and is scheduled to get an EGD on 10/29 as outpt. About 1.5 weeks ago, pt started to have upper respiratory symptoms, so was started on antibiotics, including erythromycin. After taking the antibiotics for 5 days, pt started to get severe epigastric cramping, usually worse after eating. Pt self-tapered the antibiotics, and for the past 6 days, has been unable to tolerate any PO food. Pt reports she gets epigastric/chest pressure that lasts between 5 and 45 minutes, usually occurs a couple times a day, and "comes in waves". Associated with these symptoms is chills, tingling in her b/l feet, and "gas" in her belly causing belching. Pt states that the chest pain/abd pain is mildly worse with deep inspiration, and does not seem to be affected by exertion. Pt states she had a LHC on around 9/13/19 @ [facility] in the city, result of which was that she has a blockage in "1 or 2 of the grafts", and that she is being treated medically for this. Pt states the carafate, pepcid, and maalox didn't seem to help. Pt went to see her GI doctor yesterday, who sent stool culture and cdiff.  Ate breakfast this morning and feels well so far.     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, OM &amp; LAD bypassed (SVG x 2, LIMA x 1)      MEDICATIONS  (STANDING):  carvedilol 25 milliGRAM(s) Oral every 12 hours  clopidogrel Tablet 75 milliGRAM(s) Oral daily  furosemide    Tablet 20 milliGRAM(s) Oral daily  hydrALAZINE 25 milliGRAM(s) Oral two times a day  influenza   Vaccine 0.5 milliLiter(s) IntraMuscular once  isosorbide   mononitrate ER Tablet (IMDUR) 60 milliGRAM(s) Oral daily  loratadine Syrup 10 milliGRAM(s) Oral daily  mirtazapine 30 milliGRAM(s) Oral at bedtime  rosuvastatin 40 milliGRAM(s) Oral at bedtime  sucralfate suspension 1 Gram(s) Oral two times a day      Allergies    aspirin (Other; Swelling)  latex (Pruritus; Rash)    Intolerances        SOCIAL HISTORY:  Denies ETOh,Smoking,     FAMILY HISTORY:  No pertinent family history in first degree relatives      REVIEW OF SYSTEMS:    CONSTITUTIONAL: No weakness, fevers or chills  EYES/ENT: No visual changes;  No vertigo or throat pain   NECK: No pain or stiffness  RESPIRATORY: No cough, wheezing, hemoptysis; No shortness of breath  CARDIOVASCULAR: No chest pain or palpitations  GASTROINTESTINAL: No abdominal or epigastric pain. No nausea, vomiting, or hematemesis; No diarrhea or constipation. No melena or hematochezia.  GENITOURINARY: No dysuria, frequency or hematuria  NEUROLOGICAL: No numbness or weakness  SKIN: No itching, burning, rashes, or lesions   All other review of systems is negative unless indicated above.    VITAL:  T(C): , Max: 37.2 (10-19-19 @ 16:30)  T(F): , Max: 99 (10-19-19 @ 16:30)  HR: 54 (10-20-19 @ 12:34)  BP: 120/62 (10-20-19 @ 12:34)  BP(mean): --  RR: 18 (10-20-19 @ 12:34)  SpO2: 96% (10-20-19 @ 12:34)  Wt(kg): --    I and O's:    10-19 @ 07:01  -  10-20 @ 07:00  --------------------------------------------------------  IN: 240 mL / OUT: 0 mL / NET: 240 mL    10-20 @ 07:01  -  10-20 @ 12:47  --------------------------------------------------------  IN: 240 mL / OUT: 0 mL / NET: 240 mL          PHYSICAL EXAM:    Constitutional: NAD  HEENT: PERRLA,   Neck: No JVD  Respiratory: CTA B/L  Cardiovascular: S1 and S2  Gastrointestinal: BS+, soft, mild epigastric discomfort  Extremities: No peripheral edema  Neurological: A/O x 3, no focal deficits  Psychiatric: Normal mood, normal affect  : No Vines  Skin: No rashes  Access: Not applicable  Back: No CVA tenderness    LABS:                        10.1   3.73  )-----------( 155      ( 20 Oct 2019 05:34 )             29.7     10-20    144  |  108  |  11  ----------------------------<  82  3.7   |  23  |  1.36<H>    Ca    9.4      20 Oct 2019 05:34  Phos  2.9     10-19  Mg     2.3     10-19    TPro  6.8  /  Alb  4.1  /  TBili  0.4  /  DBili  x   /  AST  26  /  ALT  15  /  AlkPhos  53  10-19          RADIOLOGY & ADDITIONAL STUDIES: CT abd/pelvis 10-14 no acute pathology, sono shows multiple GB polyps 4mm, possible sludge ball vs polyp, no signs of acute khadar

## 2019-10-20 NOTE — CONSULT NOTE ADULT - SUBJECTIVE AND OBJECTIVE BOX
HPI:   Patient is a 79y female with remote CABG, stent, HTN, here last month with epigastric pain/chest tightness following course of antibx and Phenergan.   She notes return of flu-like Sxs, cough, bronchitis, given Phenergan and Cefdinir.  After 4 days of this, again epigastric and chest discomfort, prompting admission 10/18.  Her Sxs have improved off these.  She denies cough or increased sputum, no vomiting or diarrhea.  No recent fever or chills.  Afebrile since arrival,  No  Sxs at present.    REVIEW OF SYSTEMS:  All other review of systems negative (Comprehensive ROS)    PAST MEDICAL & SURGICAL HISTORY:  Prediabetes  Cataracts, bilateral  Myocardial infarct  Coronary artery disease: s/p PCI with one stent to LAD  MRSA infection: nasal abscess  Depression  Dyslipidemia  Kidney stones  Hypertension  S/P coronary artery bypass graft x 3: RCA, OM &amp; LAD bypassed (SVG x 2, LIMA x 1)      Allergies  aspirin (Other; Swelling)  latex (Pruritus; Rash)    Antimicrobials off      Other Medications:  acetaminophen   Tablet .. 650 milliGRAM(s) Oral every 8 hours PRN  carvedilol 25 milliGRAM(s) Oral every 12 hours  clopidogrel Tablet 75 milliGRAM(s) Oral daily  furosemide    Tablet 20 milliGRAM(s) Oral daily  hydrALAZINE 25 milliGRAM(s) Oral two times a day  influenza   Vaccine 0.5 milliLiter(s) IntraMuscular once  isosorbide   mononitrate ER Tablet (IMDUR) 60 milliGRAM(s) Oral daily  loratadine Syrup 10 milliGRAM(s) Oral daily  mirtazapine 30 milliGRAM(s) Oral at bedtime  rosuvastatin 40 milliGRAM(s) Oral at bedtime  sucralfate suspension 1 Gram(s) Oral two times a day      FAMILY HISTORY:  No pertinent family history in first degree relatives      SOCIAL HISTORY:  Smoking: former    ETOH: no      Single     T(F): 98.6 (10-20-19 @ 12:34), Max: 99 (10-19-19 @ 16:30)  HR: 54 (10-20-19 @ 12:34)  BP: 120/62 (10-20-19 @ 12:34)  RR: 18 (10-20-19 @ 12:34)  SpO2: 96% (10-20-19 @ 12:34)  Wt(kg): --    PHYSICAL EXAM:  General: alert, no acute distress  Eyes:  anicteric, no conjunctival injection, no discharge  Oropharynx: no lesions or injection 	  Neck: supple, without adenopathy  Lungs: clear to auscultation  Heart: regular rate and rhythm; no murmur, rubs or gallops  Abdomen: soft, nondistended, nontender, without mass or organomegaly  Skin: no lesions  Extremities: no clubbing, cyanosis, or edema  Neurologic: alert, oriented, moves all extremities    LAB RESULTS:                        10.1   3.73  )-----------( 155      ( 20 Oct 2019 05:34 )             29.7     10-20    144  |  108  |  11  ----------------------------<  82  3.7   |  23  |  1.36<H>    Ca    9.4      20 Oct 2019 05:34  Phos  2.9     10-19  Mg     2.3     10-19    TPro  6.8  /  Alb  4.1  /  TBili  0.4  /  DBili  x   /  AST  26  /  ALT  15  /  AlkPhos  53  10-19    Urinalysis Basic - ( 18 Oct 2019 20:46 )    Color: Light Yellow / Appearance: Clear / S.012 / pH: x  Gluc: x / Ketone: Trace  / Bili: Negative / Urobili: Negative   Blood: x / Protein: Negative / Nitrite: Negative   Leuk Esterase: Large / RBC: 1 /hpf / WBC 7 /HPF   Sq Epi: x / Non Sq Epi: 1 /hpf / Bacteria: Negative    MICROBIOLOGY:  RECENT CULTURES:  10-19 @ 01:55 .Urine     10,000 - 49,000 CFU/mL Gram Negative Rods  10,000 - 49,000 CFU/mL Gram positive organisms    RADIOLOGY REVIEWED:  Xray Chest 2 Views PA/Lat (10.18.19 @ 17:58) >  Clear lungs.

## 2019-10-20 NOTE — CONSULT NOTE ADULT - ASSESSMENT
79y female with remote CABG, stent, HTN, here last month with epigastric pain/chest tightness following course of antibx and Phenergan- cardiac and GI eval's at that time  She notes return of flu-like Sxs more recently, cough, bronchitis, given Phenergan and Cefdinir.  After 4 days of this, again epigastric and chest discomfort, prompting admission 10/18.    Sxs now improved; no cough or increased sputum, afebrile since arrival, CXR clear  WBC normal  No  Sxs at present- would view + urine cx as asymptomatic bacteriuria    Plan:  No support for active infection, observe off abx  ?EGD and/or HIDA as per GI  Avoid Phenergan in future  D/w pt and son

## 2019-10-21 ENCOUNTER — TRANSCRIPTION ENCOUNTER (OUTPATIENT)
Age: 79
End: 2019-10-21

## 2019-10-21 VITALS
OXYGEN SATURATION: 97 % | TEMPERATURE: 99 F | RESPIRATION RATE: 18 BRPM | SYSTOLIC BLOOD PRESSURE: 145 MMHG | HEART RATE: 60 BPM | DIASTOLIC BLOOD PRESSURE: 69 MMHG

## 2019-10-21 LAB
-  AMIKACIN: SIGNIFICANT CHANGE UP
-  AMPICILLIN/SULBACTAM: SIGNIFICANT CHANGE UP
-  AMPICILLIN: SIGNIFICANT CHANGE UP
-  AMPICILLIN: SIGNIFICANT CHANGE UP
-  AZTREONAM: SIGNIFICANT CHANGE UP
-  CEFAZOLIN: SIGNIFICANT CHANGE UP
-  CEFEPIME: SIGNIFICANT CHANGE UP
-  CEFOXITIN: SIGNIFICANT CHANGE UP
-  CEFTRIAXONE: SIGNIFICANT CHANGE UP
-  CIPROFLOXACIN: SIGNIFICANT CHANGE UP
-  CIPROFLOXACIN: SIGNIFICANT CHANGE UP
-  ERTAPENEM: SIGNIFICANT CHANGE UP
-  GENTAMICIN: SIGNIFICANT CHANGE UP
-  IMIPENEM: SIGNIFICANT CHANGE UP
-  LEVOFLOXACIN: SIGNIFICANT CHANGE UP
-  LEVOFLOXACIN: SIGNIFICANT CHANGE UP
-  MEROPENEM: SIGNIFICANT CHANGE UP
-  NITROFURANTOIN: SIGNIFICANT CHANGE UP
-  NITROFURANTOIN: SIGNIFICANT CHANGE UP
-  PIPERACILLIN/TAZOBACTAM: SIGNIFICANT CHANGE UP
-  TETRACYCLINE: SIGNIFICANT CHANGE UP
-  TIGECYCLINE: SIGNIFICANT CHANGE UP
-  TOBRAMYCIN: SIGNIFICANT CHANGE UP
-  TRIMETHOPRIM/SULFAMETHOXAZOLE: SIGNIFICANT CHANGE UP
-  VANCOMYCIN: SIGNIFICANT CHANGE UP
ANION GAP SERPL CALC-SCNC: 12 MMOL/L — SIGNIFICANT CHANGE UP (ref 5–17)
BUN SERPL-MCNC: 12 MG/DL — SIGNIFICANT CHANGE UP (ref 7–23)
CALCIUM SERPL-MCNC: 9 MG/DL — SIGNIFICANT CHANGE UP (ref 8.4–10.5)
CHLORIDE SERPL-SCNC: 108 MMOL/L — SIGNIFICANT CHANGE UP (ref 96–108)
CO2 SERPL-SCNC: 24 MMOL/L — SIGNIFICANT CHANGE UP (ref 22–31)
CREAT SERPL-MCNC: 1.22 MG/DL — SIGNIFICANT CHANGE UP (ref 0.5–1.3)
CULTURE RESULTS: SIGNIFICANT CHANGE UP
GLUCOSE SERPL-MCNC: 86 MG/DL — SIGNIFICANT CHANGE UP (ref 70–99)
HCT VFR BLD CALC: 29.9 % — LOW (ref 34.5–45)
HGB BLD-MCNC: 10.2 G/DL — LOW (ref 11.5–15.5)
MCHC RBC-ENTMCNC: 31.9 PG — SIGNIFICANT CHANGE UP (ref 27–34)
MCHC RBC-ENTMCNC: 34.1 GM/DL — SIGNIFICANT CHANGE UP (ref 32–36)
MCV RBC AUTO: 93.4 FL — SIGNIFICANT CHANGE UP (ref 80–100)
METHOD TYPE: SIGNIFICANT CHANGE UP
METHOD TYPE: SIGNIFICANT CHANGE UP
NRBC # BLD: 0 /100 WBCS — SIGNIFICANT CHANGE UP (ref 0–0)
ORGANISM # SPEC MICROSCOPIC CNT: SIGNIFICANT CHANGE UP
PLATELET # BLD AUTO: 161 K/UL — SIGNIFICANT CHANGE UP (ref 150–400)
POTASSIUM SERPL-MCNC: 3.8 MMOL/L — SIGNIFICANT CHANGE UP (ref 3.5–5.3)
POTASSIUM SERPL-SCNC: 3.8 MMOL/L — SIGNIFICANT CHANGE UP (ref 3.5–5.3)
RBC # BLD: 3.2 M/UL — LOW (ref 3.8–5.2)
RBC # FLD: 13.9 % — SIGNIFICANT CHANGE UP (ref 10.3–14.5)
SODIUM SERPL-SCNC: 144 MMOL/L — SIGNIFICANT CHANGE UP (ref 135–145)
SPECIMEN SOURCE: SIGNIFICANT CHANGE UP
WBC # BLD: 3.82 K/UL — SIGNIFICANT CHANGE UP (ref 3.8–10.5)
WBC # FLD AUTO: 3.82 K/UL — SIGNIFICANT CHANGE UP (ref 3.8–10.5)

## 2019-10-21 PROCEDURE — 84484 ASSAY OF TROPONIN QUANT: CPT

## 2019-10-21 PROCEDURE — 76700 US EXAM ABDOM COMPLETE: CPT

## 2019-10-21 PROCEDURE — 82553 CREATINE MB FRACTION: CPT

## 2019-10-21 PROCEDURE — 83735 ASSAY OF MAGNESIUM: CPT

## 2019-10-21 PROCEDURE — 80061 LIPID PANEL: CPT

## 2019-10-21 PROCEDURE — 84443 ASSAY THYROID STIM HORMONE: CPT

## 2019-10-21 PROCEDURE — 85027 COMPLETE CBC AUTOMATED: CPT

## 2019-10-21 PROCEDURE — 84100 ASSAY OF PHOSPHORUS: CPT

## 2019-10-21 PROCEDURE — 99285 EMERGENCY DEPT VISIT HI MDM: CPT | Mod: 25

## 2019-10-21 PROCEDURE — 83036 HEMOGLOBIN GLYCOSYLATED A1C: CPT

## 2019-10-21 PROCEDURE — 93005 ELECTROCARDIOGRAM TRACING: CPT

## 2019-10-21 PROCEDURE — 93306 TTE W/DOPPLER COMPLETE: CPT

## 2019-10-21 PROCEDURE — 82150 ASSAY OF AMYLASE: CPT

## 2019-10-21 PROCEDURE — 96374 THER/PROPH/DIAG INJ IV PUSH: CPT

## 2019-10-21 PROCEDURE — 80053 COMPREHEN METABOLIC PANEL: CPT

## 2019-10-21 PROCEDURE — 82550 ASSAY OF CK (CPK): CPT

## 2019-10-21 PROCEDURE — 94640 AIRWAY INHALATION TREATMENT: CPT

## 2019-10-21 PROCEDURE — 96375 TX/PRO/DX INJ NEW DRUG ADDON: CPT

## 2019-10-21 PROCEDURE — 81001 URINALYSIS AUTO W/SCOPE: CPT

## 2019-10-21 PROCEDURE — 87186 SC STD MICRODIL/AGAR DIL: CPT

## 2019-10-21 PROCEDURE — 85610 PROTHROMBIN TIME: CPT

## 2019-10-21 PROCEDURE — 71046 X-RAY EXAM CHEST 2 VIEWS: CPT

## 2019-10-21 PROCEDURE — 80048 BASIC METABOLIC PNL TOTAL CA: CPT

## 2019-10-21 PROCEDURE — 85730 THROMBOPLASTIN TIME PARTIAL: CPT

## 2019-10-21 PROCEDURE — 83690 ASSAY OF LIPASE: CPT

## 2019-10-21 PROCEDURE — 87086 URINE CULTURE/COLONY COUNT: CPT

## 2019-10-21 RX ORDER — HYDRALAZINE HCL 50 MG
1 TABLET ORAL
Qty: 0 | Refills: 0 | DISCHARGE
Start: 2019-10-21

## 2019-10-21 RX ORDER — ZOLPIDEM TARTRATE 10 MG/1
1 TABLET ORAL
Qty: 0 | Refills: 0 | DISCHARGE

## 2019-10-21 RX ORDER — MIRTAZAPINE 45 MG/1
1 TABLET, ORALLY DISINTEGRATING ORAL
Qty: 0 | Refills: 0 | DISCHARGE
Start: 2019-10-21

## 2019-10-21 RX ORDER — ROSUVASTATIN CALCIUM 5 MG/1
1 TABLET ORAL
Qty: 0 | Refills: 0 | DISCHARGE
Start: 2019-10-21

## 2019-10-21 RX ORDER — ISOSORBIDE MONONITRATE 60 MG/1
1 TABLET, EXTENDED RELEASE ORAL
Qty: 0 | Refills: 0 | DISCHARGE
Start: 2019-10-21

## 2019-10-21 RX ORDER — CARVEDILOL PHOSPHATE 80 MG/1
1 CAPSULE, EXTENDED RELEASE ORAL
Qty: 0 | Refills: 0 | DISCHARGE
Start: 2019-10-21

## 2019-10-21 RX ORDER — PANTOPRAZOLE SODIUM 20 MG/1
40 TABLET, DELAYED RELEASE ORAL
Refills: 0 | Status: DISCONTINUED | OUTPATIENT
Start: 2019-10-21 | End: 2019-10-21

## 2019-10-21 RX ORDER — FLUTICASONE PROPIONATE 50 MCG
1 SPRAY, SUSPENSION NASAL
Qty: 0 | Refills: 0 | DISCHARGE

## 2019-10-21 RX ORDER — PIOGLITAZONE HYDROCHLORIDE 15 MG/1
1 TABLET ORAL
Qty: 0 | Refills: 0 | DISCHARGE

## 2019-10-21 RX ORDER — SUCRALFATE 1 G
1 TABLET ORAL
Qty: 60 | Refills: 0
Start: 2019-10-21 | End: 2019-11-19

## 2019-10-21 RX ORDER — ROSUVASTATIN CALCIUM 5 MG/1
1 TABLET ORAL
Qty: 0 | Refills: 0 | DISCHARGE

## 2019-10-21 RX ORDER — FUROSEMIDE 40 MG
1 TABLET ORAL
Qty: 0 | Refills: 0 | DISCHARGE
Start: 2019-10-21

## 2019-10-21 RX ORDER — CHOLECALCIFEROL (VITAMIN D3) 125 MCG
2 CAPSULE ORAL
Qty: 0 | Refills: 0 | DISCHARGE

## 2019-10-21 RX ORDER — CLOPIDOGREL BISULFATE 75 MG/1
1 TABLET, FILM COATED ORAL
Qty: 0 | Refills: 0 | DISCHARGE

## 2019-10-21 RX ORDER — HYDRALAZINE HCL 50 MG
1 TABLET ORAL
Qty: 0 | Refills: 0 | DISCHARGE

## 2019-10-21 RX ORDER — LORATADINE 10 MG/1
10 TABLET ORAL
Qty: 0 | Refills: 0 | DISCHARGE
Start: 2019-10-21

## 2019-10-21 RX ORDER — PANTOPRAZOLE SODIUM 20 MG/1
1 TABLET, DELAYED RELEASE ORAL
Qty: 0 | Refills: 0 | DISCHARGE
Start: 2019-10-21

## 2019-10-21 RX ORDER — MIRTAZAPINE 45 MG/1
1 TABLET, ORALLY DISINTEGRATING ORAL
Qty: 0 | Refills: 0 | DISCHARGE

## 2019-10-21 RX ORDER — CARVEDILOL PHOSPHATE 80 MG/1
1 CAPSULE, EXTENDED RELEASE ORAL
Qty: 0 | Refills: 0 | DISCHARGE

## 2019-10-21 RX ORDER — CLOPIDOGREL BISULFATE 75 MG/1
1 TABLET, FILM COATED ORAL
Qty: 0 | Refills: 0 | DISCHARGE
Start: 2019-10-21

## 2019-10-21 RX ADMIN — ISOSORBIDE MONONITRATE 60 MILLIGRAM(S): 60 TABLET, EXTENDED RELEASE ORAL at 12:00

## 2019-10-21 RX ADMIN — CLOPIDOGREL BISULFATE 75 MILLIGRAM(S): 75 TABLET, FILM COATED ORAL at 12:00

## 2019-10-21 RX ADMIN — CARVEDILOL PHOSPHATE 25 MILLIGRAM(S): 80 CAPSULE, EXTENDED RELEASE ORAL at 05:39

## 2019-10-21 RX ADMIN — Medication 25 MILLIGRAM(S): at 05:38

## 2019-10-21 NOTE — PROGRESS NOTE ADULT - ASSESSMENT
78 yo F     PMH of CABG,   HTN, HLD, kidney stones     comes in for pressure-like   epigastric  chest pain x1 week.,  after  completing day  5 of a ntibiotics  for  uri  s/p  similar  presentation  on last  admisssion    	Pt started to experience intermittent pressure-like   epigastric  pain along with constant "stomach bloating" x 1 week.   Pt 's  cardiologis/  dr suyapa jimenez/    s/p  cath at  [facility], 9/ 19,  which showed that 2 of her grafts were non-functioning and it was decided that the patient would be treated medically   . Patient decided to come in because pain has been worsening /  abd  exam is  non tender  troponin  negative   gi  eval   car / dr art , well  known to pt   tele,  nsr./pvc;s  pt refusing morphine/  only wants tylenol      home  meds,  coreg/ plavix/ crestor/ hydralazinr/ remeron//     Allergic  to asa  echo noted,  hypokinesis of inferno   basal  and  septal  walls/   with diastolic  dysfunction  had bowel   movements/  doing better  no  ab, per  ID    egd  as  an op per  GI  may  d/c  today/ pt  celia  f/p  with dr naeem jimenez      < from: CT Abdomen and Pelvis w/ Oral Cont (09.14.19 @ 13:54) >  IMPRESSION:   No acute pathology  < end of copied text >     < from: Transthoracic Echocardiogram (10.19.19 @ 08:23) >  onclusions:  1. Posterior mitral annular calcification, otherwise normal  mitral valve. Mild mitral regurgitation. Mean transmitral  valve gradient equals 4 mm Hg, consistent with mild mitral  stenosis. (HRabout 60s bpm)  2. Calcified trileaflet aortic valve with normal opening.  Minimal aortic regurgitation.  3. Overall preserved left ventricular systolic function  with mild segmental wall motion abnormalities. The basal  inferior and basal inferoseptal segments are akinetic.  Remaining segmentsare normal/hyperkinetic.  4. Moderate diastolic dysfunction (Stage II).  5. Normal right ventricular size and function.  *** No previous Echo exam.    < end of copied text > 78 yo F     PMH of CABG,   HTN, HLD, kidney stones     comes in for pressure-like   epigastric  chest pain x1 week.,  after  completing day  5 of a ntibiotics  for  uri  s/p  similar  presentation  on last  admisssion    	Pt started to experience intermittent pressure-like   epigastric  pain along with constant "stomach bloating" x 1 week.   Pt 's  cardiologis/  dr suyapa jimenez/    s/p  cath at  [facility], 9/ 19,  which showed that 2 of her grafts were non-functioning and it was decided that the patient would be treated medically   . Patient decided to come in because pain has been worsening /  abd  exam is  non tender  troponin  negative   gi  eval   car / dr art , well  known to pt   tele,  nsr./pvc;s  pt refusing morphine/  only wants tylenol      home  meds,  coreg/ plavix/ crestor/ hydralazinr/ remeron//     Allergic  to asa  echo noted,  hypokinesis of inferno   basal  and  septal  walls/   with diastolic  dysfunction  had bowel   movements/  doing better  no  ab, per  ID    egd  as  an op per  GI, dr bravo , was  plan   noted  now  that  pt is on add  on schedule  today  per  gi dr salima asher  go home,  after  egd, if  stable  spoke  with  np   pt  celia  f/p  with dr naeem jimenez      < from: CT Abdomen and Pelvis w/ Oral Cont (09.14.19 @ 13:54) >  IMPRESSION:   No acute pathology  < end of copied text >     < from: Transthoracic Echocardiogram (10.19.19 @ 08:23) >  onclusions:  1. Posterior mitral annular calcification, otherwise normal  mitral valve. Mild mitral regurgitation. Mean transmitral  valve gradient equals 4 mm Hg, consistent with mild mitral  stenosis. (HRabout 60s bpm)  2. Calcified trileaflet aortic valve with normal opening.  Minimal aortic regurgitation.  3. Overall preserved left ventricular systolic function  with mild segmental wall motion abnormalities. The basal  inferior and basal inferoseptal segments are akinetic.  Remaining segmentsare normal/hyperkinetic.  4. Moderate diastolic dysfunction (Stage II).  5. Normal right ventricular size and function.  *** No previous Echo exam.    < end of copied text >

## 2019-10-21 NOTE — PROGRESS NOTE ADULT - REASON FOR ADMISSION
chest/epigastric pain

## 2019-10-21 NOTE — PROGRESS NOTE ADULT - SUBJECTIVE AND OBJECTIVE BOX
KIKO VILLA:[id number],   79yFemale followed for:  aspirin (Other; Swelling)  latex (Pruritus; Rash)    PAST MEDICAL & SURGICAL HISTORY:  Prediabetes  Cataracts, bilateral  Myocardial infarct  Coronary artery disease: s/p PCI with one stent to LAD  MRSA infection: nasal abscess  Depression  Dyslipidemia  Kidney stones  Hypertension  S/P coronary artery bypass graft x 3: RCA, OM &amp; LAD bypassed (SVG x 2, LIMA x 1)    FAMILY HISTORY:  No pertinent family history in first degree relatives    MEDICATIONS  (STANDING):  carvedilol 25 milliGRAM(s) Oral every 12 hours  clopidogrel Tablet 75 milliGRAM(s) Oral daily  furosemide    Tablet 20 milliGRAM(s) Oral daily  hydrALAZINE 25 milliGRAM(s) Oral two times a day  influenza   Vaccine 0.5 milliLiter(s) IntraMuscular once  isosorbide   mononitrate ER Tablet (IMDUR) 60 milliGRAM(s) Oral daily  loratadine Syrup 10 milliGRAM(s) Oral daily  mirtazapine 30 milliGRAM(s) Oral at bedtime  rosuvastatin 40 milliGRAM(s) Oral at bedtime  simethicone 80 milliGRAM(s) Chew four times a day  sucralfate suspension 1 Gram(s) Oral two times a day    MEDICATIONS  (PRN):  acetaminophen   Tablet .. 650 milliGRAM(s) Oral every 8 hours PRN Mild Pain (1 - 3)      Vital Signs Last 24 Hrs  T(C): 36.8 (21 Oct 2019 09:39), Max: 37 (20 Oct 2019 12:34)  T(F): 98.3 (21 Oct 2019 09:39), Max: 98.6 (20 Oct 2019 12:34)  HR: 53 (21 Oct 2019 09:39) (53 - 64)  BP: 125/69 (21 Oct 2019 09:39) (115/59 - 153/76)  BP(mean): --  RR: 18 (21 Oct 2019 09:39) (18 - 18)  SpO2: 97% (21 Oct 2019 09:39) (94% - 97%)  nc/at  s1s2  cta  soft, nt, nd no guarding or rebound  no c/c/e    CBC Full  -  ( 21 Oct 2019 06:24 )  WBC Count : 3.82 K/uL  RBC Count : 3.20 M/uL  Hemoglobin : 10.2 g/dL  Hematocrit : 29.9 %  Platelet Count - Automated : 161 K/uL  Mean Cell Volume : 93.4 fl  Mean Cell Hemoglobin : 31.9 pg  Mean Cell Hemoglobin Concentration : 34.1 gm/dL  Auto Neutrophil # : x  Auto Lymphocyte # : x  Auto Monocyte # : x  Auto Eosinophil # : x  Auto Basophil # : x  Auto Neutrophil % : x  Auto Lymphocyte % : x  Auto Monocyte % : x  Auto Eosinophil % : x  Auto Basophil % : x    10-21    144  |  108  |  12  ----------------------------<  86  3.8   |  24  |  1.22    Ca    9.0      21 Oct 2019 06:24
CC: f/u for  bactiuria  Patient reports  still gets spasms of pain in the epigastric area  REVIEW OF SYSTEMS:  All other review of systems negative (Comprehensive ROS)    Antimicrobials Day #  :    Other Medications Reviewed    T(F): 98.3 (10-21-19 @ 09:39), Max: 98.6 (10-20-19 @ 12:34)  HR: 51 (10-21-19 @ 11:59)  BP: 160/66 (10-21-19 @ 11:59)  RR: 18 (10-21-19 @ 09:39)  SpO2: 97% (10-21-19 @ 09:39)  Wt(kg): --    PHYSICAL EXAM:  General: alert, no acute distress  Eyes:  anicteric, no conjunctival injection, no discharge  Oropharynx: no lesions or injection 	  Neck: supple, without adenopathy  Lungs: clear to auscultation  Heart: regular rate and rhythm; no murmur, rubs or gallops  Abdomen: soft, nondistended, nontender, without mass or organomegaly  Skin: no lesions  Extremities: no clubbing, cyanosis, or edema  Neurologic: alert, oriented, moves all extremities    LAB RESULTS:                        10.2   3.82  )-----------( 161      ( 21 Oct 2019 06:24 )             29.9     10-21    144  |  108  |  12  ----------------------------<  86  3.8   |  24  |  1.22    Ca    9.0      21 Oct 2019 06:24          MICROBIOLOGY:  RECENT CULTURES:  10-19 @ 01:55 .Urine Enterococcus faecalis    10,000 - 49,000 CFU/mL Gram Negative Rods  10,000 - 49,000 CFU/mL Gram positive organisms      Urine Microscopic-Add On (NC) (10.18.19 @ 20:46)    Red Blood Cell - Urine: 1 /hpf    White Blood Cell - Urine: 7 /HPF    Hyaline Casts: 1 /lpf    Bacteria: Negative    Epithelial Cells: 1 /hpf        RADIOLOGY REVIEWED:      < from: CT Abdomen and Pelvis w/ Oral Cont (09.14.19 @ 13:54) >  EXAM:  CT ABDOMEN AND PELVIS OC                            PROCEDURE DATE:  09/14/2019            INTERPRETATION:  CLINICAL INFORMATION: Epigastric pain, cardiac   catheterization yesterday.    COMPARISON: CT 7/12/2019    PROCEDURE:   CT of the Abdomen and Pelvis was performed without intravenous contrast.   Intravenous contrast: None.  Oral contrast: positive contrast was administered.  Sagittal and coronal reformats were performed.    FINDINGS:    LOWER CHEST: Trace left pleural effusion.    LIVER: A few scattered hepatic cysts.  BILE DUCTS: Normal caliber.  GALLBLADDER: Within normal limits.  SPLEEN: Within normal limits.  PANCREAS: Within normal limits.  ADRENALS: Within normal limits.  KIDNEYS/URETERS: Small bilateral nonobstructing renal calculi. No   hydronephrosis. Small left renal cyst.    BLADDER: Within normal limits.  REPRODUCTIVE ORGANS: Uterus and adnexa within normal limits.    BOWEL: No bowel obstruction. Appendix is normal.   PERITONEUM: No ascites.  VESSELS: Atherosclerotic changes.  RETROPERITONEUM/LYMPH NODES: No lymphadenopathy. No retroperitoneal   hematoma.  ABDOMINAL WALL: Postprocedural changes in the right groin.  BONES: Degenerative changes.    IMPRESSION:     No acute pathology.    < from: US Abdomen Complete (10.18.19 @ 19:35) >    EXAM:  US ABDOMEN COMPLETE                            PROCEDURE DATE:  10/18/2019            INTERPRETATION:  CLINICAL INFORMATION: Right upper quadrant and   epigastric tenderness.    COMPARISON: Ultrasound abdomen from 9/14/2019.    TECHNIQUE: Sonography of the abdomen.     FINDINGS:    Liver: Redemonstration of 1.6 x 0.9 x 1.4 cm complex cystic lesion in the   right lobe of the liver.    Bile ducts: Normal caliber. Common bile duct measures 3 mm.     Gallbladder: Small mobile focus in the gallbladder without obvious   posterior to streak shadowing that may represent a sludge ball and less   likely a gallstone. Redemonstration of multiple gallbladder polyps,   measuring up to 4 mm.    Pancreas: Visualized portions are within normal limits.    Spleen: 6.8 cm. Within normal limits.    Right kidney: 9.9 cm. No hydronephrosis. 1.9 x 1.9 x 1.6 cm cystic lesion   in the right kidney    Left kidney: 8.3 cm.  No hydronephrosis. 1.4 cm cyst lower pole of the   left kidney. 4 mm nonobstructing stone in the mid left kidney.    Ascites: None.    Aorta and IVC: Visualized portions are within normal limits.    IMPRESSION:     No sonographic evidence of acute cholecystitis.    Redemonstration of multiple gallbladder polyps.    4 mm nonobstructing stone in the mid left kidney.        < end of copied text >        < end of copied text >          Assessment:  patient readmitted for evaluation of abdomen and chest pain, positive urine cultures consistent with contaminant or assymptomatic bactiuria. No active infection is apparent at present.   Plan:  Monitor off antibiotics  gi w/u as per Dr. Wahl
feeling better    REVIEW OF SYSTEMS:  GEN: no fever,    no chills  RESP: no SOB,   no cough  CVS: no chest pain,   no palpitations  GI: no abdominal pain,   no nausea,   no vomiting,   no constipation,   no diarrhea  : no dysuria,   no frequency  NEURO: no headache,   no dizziness  PSYCH: no depression,   not anxious  Derm : no rash    MEDICATIONS  (STANDING):  carvedilol 25 milliGRAM(s) Oral every 12 hours  clopidogrel Tablet 75 milliGRAM(s) Oral daily  furosemide    Tablet 20 milliGRAM(s) Oral daily  hydrALAZINE 25 milliGRAM(s) Oral two times a day  influenza   Vaccine 0.5 milliLiter(s) IntraMuscular once  isosorbide   mononitrate ER Tablet (IMDUR) 60 milliGRAM(s) Oral daily  loratadine Syrup 10 milliGRAM(s) Oral daily  mirtazapine 30 milliGRAM(s) Oral at bedtime  rosuvastatin 40 milliGRAM(s) Oral at bedtime  sucralfate suspension 1 Gram(s) Oral two times a day    MEDICATIONS  (PRN):  acetaminophen   Tablet .. 650 milliGRAM(s) Oral every 8 hours PRN Mild Pain (1 - 3)      Vital Signs Last 24 Hrs  T(C): 36.9 (20 Oct 2019 08:04), Max: 37.2 (19 Oct 2019 16:30)  T(F): 98.4 (20 Oct 2019 08:04), Max: 99 (19 Oct 2019 16:30)  HR: 59 (20 Oct 2019 08:04) (56 - 62)  BP: 128/59 (20 Oct 2019 08:04) (128/55 - 156/65)  BP(mean): --  RR: 18 (20 Oct 2019 08:04) (18 - 18)  SpO2: 98% (20 Oct 2019 08:04) (95% - 98%)  CAPILLARY BLOOD GLUCOSE        I&O's Summary    19 Oct 2019 07:01  -  20 Oct 2019 07:00  --------------------------------------------------------  IN: 240 mL / OUT: 0 mL / NET: 240 mL        PHYSICAL EXAM:  HEAD:  Atraumatic, Normocephalic  NECK: Supple, No   JVD  CHEST/LUNG:   no     rales,     no,    rhonchi  HEART: Regular rate and rhythm;         murmur  ABDOMEN: Soft, Nontender, ;   EXTREMITIES:   no     edema  NEUROLOGY:  alert    LABS:                        10.1   3.73  )-----------( 155      ( 20 Oct 2019 05:34 )             29.7     10-20    144  |  108  |  11  ----------------------------<  82  3.7   |  23  |  1.36<H>    Ca    9.4      20 Oct 2019 05:34  Phos  2.9     10-19  Mg     2.3     10-19    TPro  6.8  /  Alb  4.1  /  TBili  0.4  /  DBili  x   /  AST  26  /  ALT  15  /  AlkPhos  53  10-19    PT/INR - ( 18 Oct 2019 17:50 )   PT: 12.0 sec;   INR: 1.04 ratio         PTT - ( 18 Oct 2019 17:50 )  PTT:28.2 sec  CARDIAC MARKERS ( 19 Oct 2019 01:37 )  x     / x     / 71 U/L / x     / 1.3 ng/mL      Urinalysis Basic - ( 18 Oct 2019 20:46 )    Color: Light Yellow / Appearance: Clear / S.012 / pH: x  Gluc: x / Ketone: Trace  / Bili: Negative / Urobili: Negative   Blood: x / Protein: Negative / Nitrite: Negative   Leuk Esterase: Large / RBC: 1 /hpf / WBC 7 /HPF   Sq Epi: x / Non Sq Epi: 1 /hpf / Bacteria: Negative            Hemoglobin A1C, Whole Blood: 5.5 % (10-19 @ 02:52)    Thyroid Stimulating Hormone, Serum: 3.90 uIU/mL (10-19 @ 02:55)          Consultant(s) Notes Reviewed:      Care Discussed with Consultants/Other Providers:
no  cp/  abd pian    REVIEW OF SYSTEMS:  GEN: no fever,    no chills  RESP: no SOB,   no cough  CVS: no chest pain,   no palpitations  GI: no abdominal pain,   no nausea,   no vomiting,   no constipation,   no diarrhea  : no dysuria,   no frequency  NEURO: no headache,   no dizziness  PSYCH: no depression,   not anxious  Derm : no rash    MEDICATIONS  (STANDING):  carvedilol 25 milliGRAM(s) Oral every 12 hours  clopidogrel Tablet 75 milliGRAM(s) Oral daily  furosemide    Tablet 20 milliGRAM(s) Oral daily  hydrALAZINE 25 milliGRAM(s) Oral two times a day  influenza   Vaccine 0.5 milliLiter(s) IntraMuscular once  isosorbide   mononitrate ER Tablet (IMDUR) 60 milliGRAM(s) Oral daily  loratadine Syrup 10 milliGRAM(s) Oral daily  mirtazapine 30 milliGRAM(s) Oral at bedtime  rosuvastatin 40 milliGRAM(s) Oral at bedtime  simethicone 80 milliGRAM(s) Chew four times a day  sucralfate suspension 1 Gram(s) Oral two times a day    MEDICATIONS  (PRN):  acetaminophen   Tablet .. 650 milliGRAM(s) Oral every 8 hours PRN Mild Pain (1 - 3)      Vital Signs Last 24 Hrs  T(C): 36.8 (21 Oct 2019 09:39), Max: 37 (20 Oct 2019 12:34)  T(F): 98.3 (21 Oct 2019 09:39), Max: 98.6 (20 Oct 2019 12:34)  HR: 53 (21 Oct 2019 09:39) (53 - 64)  BP: 125/69 (21 Oct 2019 09:39) (115/59 - 153/76)  BP(mean): --  RR: 18 (21 Oct 2019 09:39) (18 - 18)  SpO2: 97% (21 Oct 2019 09:39) (94% - 97%)  CAPILLARY BLOOD GLUCOSE        I&O's Summary    20 Oct 2019 07:01  -  21 Oct 2019 07:00  --------------------------------------------------------  IN: 240 mL / OUT: 0 mL / NET: 240 mL        PHYSICAL EXAM:  HEAD:  Atraumatic, Normocephalic  NECK: Supple, No   JVD  CHEST/LUNG:   no     rales,     no,    rhonchi  HEART: Regular rate and rhythm;         murmur  ABDOMEN: Soft, Nontender, ;   EXTREMITIES:   no     edema  NEUROLOGY:  alert    LABS:                        10.2   3.82  )-----------( 161      ( 21 Oct 2019 06:24 )             29.9     10-21    144  |  108  |  12  ----------------------------<  86  3.8   |  24  |  1.22    Ca    9.0      21 Oct 2019 06:24                    Hemoglobin A1C, Whole Blood: 5.5 % (10-19 @ 02:52)    Thyroid Stimulating Hormone, Serum: 3.90 uIU/mL (10-19 @ 02:55)          Consultant(s) Notes Reviewed:      Care Discussed with Consultants/Other Providers:
no  cp/sob/ abd pain now    REVIEW OF SYSTEMS:  GEN: no fever,    no chills  RESP: no SOB,   no cough  CVS: no chest pain,   no palpitations  GI: no abdominal pain,   no nausea,   no vomiting,   no constipation,   no diarrhea  : no dysuria,   no frequency  NEURO: no headache,   no dizziness  PSYCH: no depression,   not anxious  Derm : no rash    MEDICATIONS  (STANDING):  acetaminophen  IVPB .. 650 milliGRAM(s) IV Intermittent once  atorvastatin 80 milliGRAM(s) Oral at bedtime  carvedilol 25 milliGRAM(s) Oral every 12 hours  clopidogrel Tablet 75 milliGRAM(s) Oral daily  hydrALAZINE 25 milliGRAM(s) Oral two times a day  influenza   Vaccine 0.5 milliLiter(s) IntraMuscular once  isosorbide   mononitrate ER Tablet (IMDUR) 60 milliGRAM(s) Oral daily  mirtazapine 30 milliGRAM(s) Oral at bedtime  pantoprazole  Injectable 40 milliGRAM(s) IV Push daily  sucralfate 1 Gram(s) Oral two times a day    MEDICATIONS  (PRN):  morphine  - Injectable 2 milliGRAM(s) IV Push every 4 hours PRN Severe Pain (7 - 10)      Vital Signs Last 24 Hrs  T(C): 37.2 (19 Oct 2019 04:58), Max: 37.2 (19 Oct 2019 04:58)  T(F): 98.9 (19 Oct 2019 04:58), Max: 98.9 (19 Oct 2019 04:58)  HR: 55 (19 Oct 2019 04:58) (55 - 65)  BP: 141/58 (19 Oct 2019 04:58) (126/57 - 176/55)  BP(mean): --  RR: 18 (19 Oct 2019 04:58) (17 - 22)  SpO2: 97% (19 Oct 2019 04:58) (95% - 100%)  CAPILLARY BLOOD GLUCOSE        I&O's Summary      PHYSICAL EXAM:  HEAD:  Atraumatic, Normocephalic  NECK: Supple, No   JVD  CHEST/LUNG:   no     rales,     no,    rhonchi  HEART: Regular rate and rhythm;         murmur  ABDOMEN: Soft, Nontender, ;   EXTREMITIES:    no    edema  NEUROLOGY:  alert    LABS:                        10.1   3.59  )-----------( 153      ( 19 Oct 2019 01:37 )             29.3     10-19    138  |  106  |  13  ----------------------------<  95  3.6   |  21<L>  |  1.38<H>    Ca    9.5      19 Oct 2019 01:37  Phos  2.9     10-19  Mg     2.3     10-19    TPro  6.8  /  Alb  4.1  /  TBili  0.4  /  DBili  x   /  AST  26  /  ALT  15  /  AlkPhos  53  10-19    PT/INR - ( 18 Oct 2019 17:50 )   PT: 12.0 sec;   INR: 1.04 ratio         PTT - ( 18 Oct 2019 17:50 )  PTT:28.2 sec  CARDIAC MARKERS ( 19 Oct 2019 01:37 )  x     / x     / 71 U/L / x     / 1.3 ng/mL      Urinalysis Basic - ( 18 Oct 2019 20:46 )    Color: Light Yellow / Appearance: Clear / S.012 / pH: x  Gluc: x / Ketone: Trace  / Bili: Negative / Urobili: Negative   Blood: x / Protein: Negative / Nitrite: Negative   Leuk Esterase: Large / RBC: 1 /hpf / WBC 7 /HPF   Sq Epi: x / Non Sq Epi: 1 /hpf / Bacteria: Negative            Hemoglobin A1C, Whole Blood: 5.5 % (10-19 @ 02:52)    Thyroid Stimulating Hormone, Serum: 3.90 uIU/mL (10-19 @ 02:55)          Consultant(s) Notes Reviewed:      Care Discussed with Consultants/Other Providers:

## 2019-10-21 NOTE — DISCHARGE NOTE PROVIDER - HOSPITAL COURSE
79yF h/o CAD, HLD, HTN, renal stones, pre-DM p/w epigastric and RUQ pain with difficulty tolerating PO.    Evaluated by GI recommended  OP work up US abdomen  with gallbladder cysts .    Pt tolerating diet  be discharged  home  with Follow up with DR Cortes  for EGD

## 2019-10-21 NOTE — DISCHARGE NOTE PROVIDER - PROVIDER TOKENS
PROVIDER:[TOKEN:[[id number]]],FREE:[LAST:[DR Villafuerte],FIRST:[Del],PHONE:[(   )    -],FAX:[(   )    -],ADDRESS:[PCP]]

## 2019-10-21 NOTE — DISCHARGE NOTE NURSING/CASE MANAGEMENT/SOCIAL WORK - PATIENT PORTAL LINK FT
You can access the FollowMyHealth Patient Portal offered by [facility] by registering at the following website: [email or web address]. By joining [employer]’s FollowMyHealth portal, you will also be able to view your health information using other applications (apps) compatible with our system.

## 2019-10-21 NOTE — DISCHARGE NOTE PROVIDER - NSDCCPCAREPLAN_GEN_ALL_CORE_FT
PRINCIPAL DISCHARGE DIAGNOSIS  Diagnosis: Epigastric pain  Assessment and Plan of Treatment: resolved   please follow up with DR Arce for EGD      SECONDARY DISCHARGE DIAGNOSES  Diagnosis: Essential hypertension  Assessment and Plan of Treatment: Follow up with your medical doctor to establish long term blood pressure treatment goals.      Diagnosis: Chronic kidney disease, unspecified CKD stage  Assessment and Plan of Treatment: Avoid taking (NSAIDs) - (ex: Ibuprofen, Advil, Celebrex, Naprosyn)  Avoid taking any nephrotoxic agents (can harm kidneys) - Intravenous contrast for diagnostic testing, combination cold medications.  Have all medications adjusted for your renal function by your Health Care Provider.  Blood pressure control is important.  Take all medication as prescribed.

## 2019-10-30 NOTE — ED ADULT TRIAGE NOTE - SOURCE OF INFORMATION
Patient anticipated discharge recommendation/impairments found/rehab potential/anticipated equipment needs at discharge

## 2020-01-05 NOTE — PROVIDER CONTACT NOTE (OTHER) - NAME OF MD/NP/PA/DO NOTIFIED:
Pt reports falling this a.m. on right knee after passing out when started coughing and was \"choking\". Reports this happens occasionally when he coughs too hard. Hit right knee and right side of head. Right knee is now swollen. Able to bear weight with limp. Denies blood thinners. CATERINA Walters

## 2020-02-05 NOTE — ED ADULT NURSE NOTE - FALL HARM RISK TYPE OF ASSESSMENT
"Consult Date:  02/05/2020      PSYCHIATRY CONSULTATION      REQUESTING PHYSICIAN:  Dr. Tu Vasquez.        REASON FOR CONSULTATION:  Alcohol dependence.      IDENTIFYING DATA:  The patient is a 60-year-old   male with a notable history of an alcohol use disorder who comes in with symptoms of alcohol withdrawal.  He had been trying to wean himself and started vomiting and had a seizure in the emergency room.  He is currently convalescing in the ICU and is alert and oriented.      CHIEF COMPLAINT:  \"I know what I need to do.  I was getting away from AA.\"      HISTORY OF PRESENT ILLNESS:  The patient is a 60-year-old gentleman with a known alcohol use disorder.  He has had a lifelong history of drinking and been in treatment several times with 1 DWI.  He has had long periods of sobriety up to 10 years.  He relapsed last fall and then he relapsed again several days ago and drank heavy amounts of vodka.  He knew that he was getting into trouble, so he started trying to wean himself by drinking 3.2 beer.  He ended up getting quite ill.  He came to the ER and then he had a seizure.  He does not remember much of that.  He had some hypochloremic hyponatremia on admission with an anion gap metabolic acidosis.  He seems to be improving, he is on a withdrawal protocol.  His blood alcohol level on admission was less than 0.01.  CT scan of the head was normal.  His sodium on admission was 123.      On further questioning, he does not endorse any convincing history of depression, anxiety, panic, OCD, karla, psychosis, eating disorder history, trauma history or ADHD.  He is not currently on any psychotropic meds that I am aware of.  He has never been hospitalized for psychiatric reasons.  He works as a  and lives with his wife.  She does not drink and he acknowledges she is concerned about his drinking.  We talked about options to add additional support with a chemical dependency assessment, and he is " "ambivalent about this, feeling like he just wants to get back to AA.      PAST PSYCHIATRIC HISTORY:  Unremarkable.      PAST CHEMICAL DEPENDENCY HISTORY:  Notable for an alcohol use disorder.  He has been in treatment before and has had consistent involvement with AA, though he has been tailing off with this recently and relapsed several days prior to admission.  He denies other substance use.      PAST MEDICAL HISTORY:  Per chart review includes alcohol withdrawal with alcohol withdrawal seizure this admission, GERD, cervical stenosis.      PRIOR TO ADMISSION MEDICATIONS:  Aspirin, EpiPen.      FAMILY HISTORY:  Notable for an alcohol use disorder in both parents.      SOCIAL HISTORY:  The patient is , no children, works as a .  He is living locally and has a couple of half siblings.  His parents were  when he was 11.  He has 1 DWI, denies  history or other trauma history.      REVIEW OF SYSTEMS:  A 10-point review of systems currently notable for some tremulousness on neurologic exam consistent with alcohol withdrawal, otherwise negative.      MOST RECENT VITAL SIGNS:  Temperature 98.8, pulse 71, respiratory rate 17, blood pressure 107/63, oxygen saturation 99%.      MENTAL STATUS EXAMINATION:  Appearance:  The patient is a disheveled, flushed appearing gentleman lying in bed.  He is alert, cooperative and makes good eye contact.  He is judged to be a reasonable historian.  Speech is of normal rate, flow and tone.  Use of language appropriate.  Motor exam tremulous.  Coordination, station, gait not tested.  Muscle strength and tone adequate.  Affect is slightly dysphoric.  Mood \"okay.\"  Thought process logical, coherent and goal directed.  No loosening of associations, no flight of ideas.  No formal thought disorder on exam.  Thought content negative for hallucinations, delusions, paranoia, suicidal or homicidal ideation.  Insight and judgment mildly impaired with respect to chemical " use, though he does understand his need to reconnect with AA.  Cognitive exam:  The patient is alert and oriented x 3.  Concentration fair.  Recent memory poor due to his seizure.  He has little recollection of that.  Remote memory grossly intact.  General fund of knowledge average.      IMPRESSION:  The patient is a 60-year-old gentleman with a severe alcohol use disorder and a recent relapse.  Currently, he is declining chemical dependency assessment.  I certainly offered him that option and suggested he might benefit from additional support, but he is more interested in returning to AA and sponsorship.  Once he is deemed medically stable and appropriately detoxed, he can be discharged.  Providing resources for chemical dependency assessment would be appropriate.      DIAGNOSES:   1.  Alcohol use disorder, severe.   2.  Alcohol withdrawal.   3.  Alcohol withdrawal seizure.      PLAN:   1.  Medical management and detoxification.   2.  Offer chemical dependency assessment.   3.  Okay for discharge from a psychiatric standpoint when deemed medically stable, his immediate plan is to return to AA and sponsorship.         GERMANIA SMITH MD             D: 2020   T: 2020   MT: WT      Name:     RACHAEL BRANCH   MRN:      [id number]        Account:       [id number]   :      1959           Consult Date:  2020      Document: [id number]     Daily Assessment

## 2020-11-14 NOTE — ASU PREOP CHECKLIST - DNR CLARIFICATION FORM COMPLETED
n/a [FreeTextEntry1] : New Patient [de-identified] : 33 year old here to establish care. Reports being in good general health. Has a history of seasonal allergies and normally takes Singulair but ran out and is requesting a new script. Formally using [facility] for primary care and prescriptions. Has a history of Scoliosis and gets flair ups with prolonged sitting or standing (requesting a PT referral). Was seen by dermatologist for skin checks. Up to date on cervical cancer screenings, last one this year and normal. Currently working as a teacher. Got flu shot this season.

## 2020-12-31 ENCOUNTER — TRANSCRIPTION ENCOUNTER (OUTPATIENT)
Age: 80
End: 2020-12-31

## 2021-04-01 NOTE — ED PROVIDER NOTE - CARE PLAN
.
Principal Discharge DX:	Acute gastroenteritis  Secondary Diagnosis:	Vomiting  Secondary Diagnosis:	Dehydration

## 2021-07-29 NOTE — ED ADULT NURSE NOTE - FALLEN IN THE PAST
Received message from patient's nurse stating:     Pt's sodium level is 128         Discussion / orders:    Patient received 1 dose of 3% sodium chloride infusion yesterday at 1334 and again this morning at 1027  He also was given a dose of tolvaptan at 2214 on 7/28    Will continue to monitor. Please note that this note was dictated using Dragon computer voice recognition software. Quite often unanticipated grammatical, syntax, homophones, and other interpretive errors are inadvertently transcribed by the computer software. Please disregard these errors. Please excuse any errors that have escaped final proofreading. no

## 2022-10-26 ENCOUNTER — APPOINTMENT (OUTPATIENT)
Dept: NEUROLOGY | Facility: CLINIC | Age: 82
End: 2022-10-26
Payer: MEDICARE

## 2022-10-26 VITALS
HEIGHT: 60 IN | SYSTOLIC BLOOD PRESSURE: 122 MMHG | WEIGHT: 107 LBS | DIASTOLIC BLOOD PRESSURE: 74 MMHG | BODY MASS INDEX: 21.01 KG/M2

## 2022-10-26 DIAGNOSIS — Z86.59 PERSONAL HISTORY OF OTHER MENTAL AND BEHAVIORAL DISORDERS: ICD-10-CM

## 2022-10-26 DIAGNOSIS — Z86.79 PERSONAL HISTORY OF OTHER DISEASES OF THE CIRCULATORY SYSTEM: ICD-10-CM

## 2022-10-26 DIAGNOSIS — G47.00 INSOMNIA, UNSPECIFIED: ICD-10-CM

## 2022-10-26 DIAGNOSIS — Z82.0 FAMILY HISTORY OF EPILEPSY AND OTHER DISEASES OF THE NERVOUS SYSTEM: ICD-10-CM

## 2022-10-26 DIAGNOSIS — Z86.39 PERSONAL HISTORY OF OTHER ENDOCRINE, NUTRITIONAL AND METABOLIC DISEASE: ICD-10-CM

## 2022-10-26 PROCEDURE — 99205 OFFICE O/P NEW HI 60 MIN: CPT

## 2022-10-26 NOTE — HISTORY OF PRESENT ILLNESS
[FreeTextEntry1] : 82-year-old woman who is here for initial consultation of forgetfulness especially with conversations.  Patient has retired from her lifelong work as a nurse and has symptoms of depression.  Patient is currently on mirtazapine 30 mg at night for many years.  Patient was started on vraylar (no history of bipolar) but she had stopped it after a month.  Patient denies any patient's or personality change and she does not get lost in familiar areas.\par \par Patient has family history of Alzheimer's in her mother who started experiencing symptoms in her 70s.

## 2022-10-26 NOTE — PHYSICAL EXAM
[Total Score ___ / 30] : the patient achieved a score of [unfilled] /30 [General Appearance - Alert] : alert [FreeTextEntry1] : Flat affect [Cranial Nerves Optic (II)] : visual acuity intact bilaterally,  visual fields full to confrontation, pupils equal round and reactive to light [Cranial Nerves Oculomotor (III)] : extraocular motion intact [Cranial Nerves Vestibulocochlear (VIII)] : hearing was intact bilaterally [Cranial Nerves Accessory (XI - Cranial And Spinal)] : head turning and shoulder shrug symmetric [Motor Tone] : muscle tone was normal in all four extremities [Motor Strength] : muscle strength was normal in all four extremities [Sensation Tactile Decrease] : light touch was intact [Abnormal Walk] : normal gait [Coordination - Dysmetria Impaired Finger-to-Nose Bilateral] : not present [1+] : Patella left 1+

## 2022-10-26 NOTE — DISCUSSION/SUMMARY
[FreeTextEntry1] : 82-year-old woman who is here for initial consultation of mild cognitive impairment versus pseudodementia caused by her depression and anxiety.  Patient was offered other antidepressants or elevation of the dose of mirtazapine however family and patient were not interested in any medication adjustments.  Patient was agreeable to checking blood work along with undergoing neuropsych evaluation.  Patient will follow up with me after the studies are completed.\par \par I spent the time noted on the day of this patient encounter preparing for, providing and documenting the above E/M service and counseling and educate patient on differential, workup, disease course, and treatment/management. Education was provided to the patient during this encounter. All questions and concerns were answered and addressed in detail. The patient verbalized understanding and agreed to plan. Patient was advised to continue to monitor for neurologic symptoms and to notify my office or go to the nearest emergency room if there are any changes. Any orders/referrals and communications were provided as well. \par Side effects of the above medications were discussed in detail including but not limited to applicable black box warning and teratogenicity as appropriate. \par Patient was advised to bring previous records to my office. \par \par \par

## 2022-10-28 ENCOUNTER — EMERGENCY (EMERGENCY)
Facility: HOSPITAL | Age: 82
LOS: 1 days | Discharge: HOME | End: 2022-10-28
Attending: STUDENT IN AN ORGANIZED HEALTH CARE EDUCATION/TRAINING PROGRAM
Payer: MEDICARE

## 2022-10-28 VITALS
OXYGEN SATURATION: 97 % | TEMPERATURE: 98 F | RESPIRATION RATE: 18 BRPM | SYSTOLIC BLOOD PRESSURE: 147 MMHG | HEIGHT: 60 IN | DIASTOLIC BLOOD PRESSURE: 77 MMHG | HEART RATE: 89 BPM | WEIGHT: 119.93 LBS

## 2022-10-28 PROCEDURE — 99285 EMERGENCY DEPT VISIT HI MDM: CPT

## 2022-10-28 PROCEDURE — 93010 ELECTROCARDIOGRAM REPORT: CPT

## 2022-10-28 NOTE — ED ADULT TRIAGE NOTE - SPO2 (%)
If she is still having symptoms we can trial amoxicillin, but if she is improved by next week we need to see her again to further evaluate 97

## 2022-10-29 VITALS
SYSTOLIC BLOOD PRESSURE: 159 MMHG | RESPIRATION RATE: 17 BRPM | DIASTOLIC BLOOD PRESSURE: 72 MMHG | HEART RATE: 68 BPM | OXYGEN SATURATION: 100 %

## 2022-10-29 LAB
ALBUMIN SERPL ELPH-MCNC: 4.6 G/DL — SIGNIFICANT CHANGE UP (ref 3.3–5)
ALP SERPL-CCNC: 53 U/L — SIGNIFICANT CHANGE UP (ref 40–120)
ALT FLD-CCNC: 12 U/L — SIGNIFICANT CHANGE UP (ref 10–45)
ANION GAP SERPL CALC-SCNC: 13 MMOL/L — SIGNIFICANT CHANGE UP (ref 5–17)
AST SERPL-CCNC: 37 U/L — SIGNIFICANT CHANGE UP (ref 10–40)
BASOPHILS # BLD AUTO: 0.02 K/UL — SIGNIFICANT CHANGE UP (ref 0–0.2)
BASOPHILS NFR BLD AUTO: 0.5 % — SIGNIFICANT CHANGE UP (ref 0–2)
BILIRUB SERPL-MCNC: 0.3 MG/DL — SIGNIFICANT CHANGE UP (ref 0.2–1.2)
BUN SERPL-MCNC: 31 MG/DL — HIGH (ref 7–23)
CALCIUM SERPL-MCNC: 9.6 MG/DL — SIGNIFICANT CHANGE UP (ref 8.4–10.5)
CHLORIDE SERPL-SCNC: 100 MMOL/L — SIGNIFICANT CHANGE UP (ref 96–108)
CO2 SERPL-SCNC: 24 MMOL/L — SIGNIFICANT CHANGE UP (ref 22–31)
CREAT SERPL-MCNC: 2.14 MG/DL — HIGH (ref 0.5–1.3)
EGFR: 23 ML/MIN/1.73M2 — LOW
EOSINOPHIL # BLD AUTO: 0.07 K/UL — SIGNIFICANT CHANGE UP (ref 0–0.5)
EOSINOPHIL NFR BLD AUTO: 1.7 % — SIGNIFICANT CHANGE UP (ref 0–6)
FLUAV AG NPH QL: SIGNIFICANT CHANGE UP
FLUBV AG NPH QL: SIGNIFICANT CHANGE UP
GLUCOSE SERPL-MCNC: 96 MG/DL — SIGNIFICANT CHANGE UP (ref 70–99)
HCT VFR BLD CALC: 34.2 % — LOW (ref 34.5–45)
HGB BLD-MCNC: 11.4 G/DL — LOW (ref 11.5–15.5)
IMM GRANULOCYTES NFR BLD AUTO: 0.2 % — SIGNIFICANT CHANGE UP (ref 0–0.9)
LIDOCAIN IGE QN: 85 U/L — HIGH (ref 7–60)
LYMPHOCYTES # BLD AUTO: 1.08 K/UL — SIGNIFICANT CHANGE UP (ref 1–3.3)
LYMPHOCYTES # BLD AUTO: 26.3 % — SIGNIFICANT CHANGE UP (ref 13–44)
MAGNESIUM SERPL-MCNC: 2.7 MG/DL — HIGH (ref 1.6–2.6)
MCHC RBC-ENTMCNC: 31.4 PG — SIGNIFICANT CHANGE UP (ref 27–34)
MCHC RBC-ENTMCNC: 33.3 GM/DL — SIGNIFICANT CHANGE UP (ref 32–36)
MCV RBC AUTO: 94.2 FL — SIGNIFICANT CHANGE UP (ref 80–100)
MONOCYTES # BLD AUTO: 0.55 K/UL — SIGNIFICANT CHANGE UP (ref 0–0.9)
MONOCYTES NFR BLD AUTO: 13.4 % — SIGNIFICANT CHANGE UP (ref 2–14)
NEUTROPHILS # BLD AUTO: 2.37 K/UL — SIGNIFICANT CHANGE UP (ref 1.8–7.4)
NEUTROPHILS NFR BLD AUTO: 57.9 % — SIGNIFICANT CHANGE UP (ref 43–77)
NRBC # BLD: 0 /100 WBCS — SIGNIFICANT CHANGE UP (ref 0–0)
NT-PROBNP SERPL-SCNC: 519 PG/ML — HIGH (ref 0–300)
PLATELET # BLD AUTO: 198 K/UL — SIGNIFICANT CHANGE UP (ref 150–400)
POTASSIUM SERPL-MCNC: 5.2 MMOL/L — SIGNIFICANT CHANGE UP (ref 3.5–5.3)
POTASSIUM SERPL-SCNC: 5.2 MMOL/L — SIGNIFICANT CHANGE UP (ref 3.5–5.3)
PROT SERPL-MCNC: 7.9 G/DL — SIGNIFICANT CHANGE UP (ref 6–8.3)
RBC # BLD: 3.63 M/UL — LOW (ref 3.8–5.2)
RBC # FLD: 14.5 % — SIGNIFICANT CHANGE UP (ref 10.3–14.5)
RSV RNA NPH QL NAA+NON-PROBE: SIGNIFICANT CHANGE UP
SARS-COV-2 RNA SPEC QL NAA+PROBE: SIGNIFICANT CHANGE UP
SODIUM SERPL-SCNC: 137 MMOL/L — SIGNIFICANT CHANGE UP (ref 135–145)
TROPONIN T, HIGH SENSITIVITY RESULT: 15 NG/L — SIGNIFICANT CHANGE UP (ref 0–51)
TROPONIN T, HIGH SENSITIVITY RESULT: 16 NG/L — SIGNIFICANT CHANGE UP (ref 0–51)
WBC # BLD: 4.1 K/UL — SIGNIFICANT CHANGE UP (ref 3.8–10.5)
WBC # FLD AUTO: 4.1 K/UL — SIGNIFICANT CHANGE UP (ref 3.8–10.5)

## 2022-10-29 PROCEDURE — 82435 ASSAY OF BLOOD CHLORIDE: CPT

## 2022-10-29 PROCEDURE — 71046 X-RAY EXAM CHEST 2 VIEWS: CPT | Mod: 26

## 2022-10-29 PROCEDURE — 82330 ASSAY OF CALCIUM: CPT

## 2022-10-29 PROCEDURE — 83880 ASSAY OF NATRIURETIC PEPTIDE: CPT

## 2022-10-29 PROCEDURE — 87637 SARSCOV2&INF A&B&RSV AMP PRB: CPT

## 2022-10-29 PROCEDURE — 83605 ASSAY OF LACTIC ACID: CPT

## 2022-10-29 PROCEDURE — 76705 ECHO EXAM OF ABDOMEN: CPT

## 2022-10-29 PROCEDURE — 82803 BLOOD GASES ANY COMBINATION: CPT

## 2022-10-29 PROCEDURE — 71046 X-RAY EXAM CHEST 2 VIEWS: CPT

## 2022-10-29 PROCEDURE — 93005 ELECTROCARDIOGRAM TRACING: CPT

## 2022-10-29 PROCEDURE — 84484 ASSAY OF TROPONIN QUANT: CPT

## 2022-10-29 PROCEDURE — 83735 ASSAY OF MAGNESIUM: CPT

## 2022-10-29 PROCEDURE — 82947 ASSAY GLUCOSE BLOOD QUANT: CPT

## 2022-10-29 PROCEDURE — 99285 EMERGENCY DEPT VISIT HI MDM: CPT | Mod: 25

## 2022-10-29 PROCEDURE — 85014 HEMATOCRIT: CPT

## 2022-10-29 PROCEDURE — 83690 ASSAY OF LIPASE: CPT

## 2022-10-29 PROCEDURE — 85018 HEMOGLOBIN: CPT

## 2022-10-29 PROCEDURE — 84295 ASSAY OF SERUM SODIUM: CPT

## 2022-10-29 PROCEDURE — 85025 COMPLETE CBC W/AUTO DIFF WBC: CPT

## 2022-10-29 PROCEDURE — 80053 COMPREHEN METABOLIC PANEL: CPT

## 2022-10-29 PROCEDURE — 84132 ASSAY OF SERUM POTASSIUM: CPT

## 2022-10-29 PROCEDURE — 76705 ECHO EXAM OF ABDOMEN: CPT | Mod: 26

## 2022-10-29 NOTE — ED PROVIDER NOTE - PATIENT PORTAL LINK FT
You can access the FollowMyHealth Patient Portal offered by [facility] by registering at the following website: [email or web address]. By joining [employer]’s FollowMyHealth portal, you will also be able to view your health information using other applications (apps) compatible with our system.

## 2022-10-29 NOTE — ED PROVIDER NOTE - PHYSICAL EXAMINATION
GENERAL: Awake. Alert. NAD. Well nourished.  HEENT: NC/AT, Conjunctiva pink, no scleral icterus. Airway patent. Moist mucous membranes.  LUNGS: CTAB. No wheezes or rales noted.  CARDIAC: Chest non-tender to palpation. RRR.  ABDOMEN: No masses noted. Soft, TTP at RUQ, ND, no rebound, no guarding.  EXT: No edema, no calf tenderness, distal pulses 2+ bilaterally  NEURO: A&Ox3. Moving all extremities. Sensation and strength intact throughout.   SKIN: Warm and dry.   PSYCH: Normal affect.

## 2022-10-29 NOTE — ED ADULT NURSE NOTE - OBJECTIVE STATEMENT
Pt states "after having some food ( chicken, potatoes, vegetables) about 5691-3218 tonight had feeling of sharp bloating. Had mild sob that stopped very quickly. Took night meds and also some maalox with relief upon arrival here. No radiation/n/v/sweats with episode. Being worked up with GI MD and have an appointment on Monday."

## 2022-10-29 NOTE — ED ADULT NURSE NOTE - NS ED NURSE RECORD ANOTHER HT AND WT
Detail Level: Zone
Quality 47: Advance Care Plan: Advance Care Planning discussed and documented; advance care plan or surrogate decision maker documented in the medical record.
Yes

## 2022-10-29 NOTE — ED PROVIDER NOTE - CLINICAL SUMMARY MEDICAL DECISION MAKING FREE TEXT BOX
82F PMH CABG, CAD s/p stent on plavix, HTN presenting with bloating and chest pressure after dinner tonight, associated with nausea, sob, took mylanta at home, sx resolved by the time she arrived to ED. Given hx and physical, ddx includes but is not limited to bilary colic, acs, chf, anemia. Plan for labs, ecg, RUQ US, cxr, reassess

## 2022-10-29 NOTE — ED PROVIDER NOTE - NSFOLLOWUPINSTRUCTIONS_ED_ALL_ED_FT
Please follow up with cardiology within 3-5 days.  Please see pcp within 2 weeks.  Please follow up with GI as scheduled for next week.    Chest Pain    Chest pain can be caused by many different conditions which may or may not be dangerous. Causes include heartburn, lung infections, heart attack, blood clot in lungs, skin infections, strain or damage to muscle, cartilage, or bones, etc. In addition to a history and physical examination, an electrocardiogram (ECG) or other lab tests may have been performed to determine the cause of your chest pain. Follow up with your primary care provider or with a cardiologist as instructed.     SEEK IMMEDIATE MEDICAL CARE IF YOU HAVE ANY OF THE FOLLOWING SYMPTOMS: worsening chest pain, coughing up blood, unexplained back/neck/jaw pain, severe abdominal pain, dizziness or lightheadedness, fainting, shortness of breath, sweaty or clammy skin, vomiting, or racing heart beat. These symptoms may represent a serious problem that is an emergency. Do not wait to see if the symptoms will go away. Get medical help right away. Call 911 and do not drive yourself to the hospital.     SEEK IMMEDIATE MEDICAL CARE IF YOU HAVE ANY OF THE FOLLOWING SYMPTOMS: fever, inability to keep sufficient fluids down, black or bloody vomitus, black or bloody stools, lightheadedness/dizziness, chest pain, severe headache, rash, shortness of breath, cold or clammy skin, confusion, pain with urination, or any signs of dehydration. Please follow up with cardiology within 3-5 days.  Please see pcp within 2 weeks.  Please follow up with GI as scheduled for next week.  Your results are attached, bring with you to your appointment.     You may take acetaminophen 1000mg every 8 hours and pepcid or maalox as needed for pain.  Chest Pain    Chest pain can be caused by many different conditions which may or may not be dangerous. Causes include heartburn, lung infections, heart attack, blood clot in lungs, skin infections, strain or damage to muscle, cartilage, or bones, etc. In addition to a history and physical examination, an electrocardiogram (ECG) or other lab tests may have been performed to determine the cause of your chest pain. Follow up with your primary care provider or with a cardiologist as instructed.     SEEK IMMEDIATE MEDICAL CARE IF YOU HAVE ANY OF THE FOLLOWING SYMPTOMS: worsening chest pain, coughing up blood, unexplained back/neck/jaw pain, severe abdominal pain, dizziness or lightheadedness, fainting, shortness of breath, sweaty or clammy skin, vomiting, or racing heart beat. These symptoms may represent a serious problem that is an emergency. Do not wait to see if the symptoms will go away. Get medical help right away. Call 911 and do not drive yourself to the hospital.     SEEK IMMEDIATE MEDICAL CARE IF YOU HAVE ANY OF THE FOLLOWING SYMPTOMS: fever, inability to keep sufficient fluids down, black or bloody vomitus, black or bloody stools, lightheadedness/dizziness, chest pain, severe headache, rash, shortness of breath, cold or clammy skin, confusion, pain with urination, or any signs of dehydration.

## 2022-10-29 NOTE — ED PROVIDER NOTE - ATTENDING CONTRIBUTION TO CARE
Attending (Sukhdeep Snell D.O.):  I have personally seen and examined this patient. I have performed a substantive portion of the visit including all aspects of the medical decision making. Resident, fellow, student, and/or ACP note reviewed. I agree on the plan of care except where noted.    82F hx of CABG, CAD s/p stent on plavix, HTN here for abdominal bloating resulting in chest pressure that occurred after eatig large meal of chicken, mashed potatoes and corn bread. Patient has beens trugling with this for days-weeks, due to see a GI doctor this upcoming monday. Took mylanta with sx resolution while here in Ed. Deneis infectious sxs, curernt chest pain, shortness of breath, diaphoresis, vomiting. Hemodynamically stable. + epigastric and RUQ tenderness, - mercer,. +slight blowing murmur, clear lungs. Low suspicion for ACS. No signs of Pe or aortic dissection. Eval for pancreatitis vs acute khadar. Check labs, cardiac biomarkers,, EKG, CXR, place on cardiac monitor for telemetry monitoring. RUQ US. Pain free at present, declines analgesia.

## 2022-10-29 NOTE — ED PROVIDER NOTE - PROGRESS NOTE DETAILS
Brianne Chahal DO (PGY2): Pt trop neg x2. Pt without pain or symptoms throughout ED stay. US neg for acute cholecystitis, positive for possible polyp vs sludge. Pt with elevated Cr, unclear baseline. Pt made aware of lab and imaging results. Questions regarding their symptoms were addressed. Advised to follow up with GI doctor and cardiology. Given strict return precautions. Pt verbalized understanding.

## 2022-10-29 NOTE — ED PROVIDER NOTE - CARE PLAN
1 Principal Discharge DX:	Chest pressure  Secondary Diagnosis:	Chronic kidney disease, unspecified CKD stage  Secondary Diagnosis:	Biliary sludge

## 2022-10-29 NOTE — ED PROVIDER NOTE - OBJECTIVE STATEMENT
82F PMH CABG, CAD s/p stent on plavix, HTN presenting with bloating and chest pressure after dinner tonight, associated with nausea, sob, took mylanta at home, sx resolved by the time she arrived to ED. Pt reports similar episodes after dinner over the past few weeks, states she is seeing GI doctor this upcoming week regarding these sx and some blood in her stool. Pt also reports dyspnea on exertion and exertional sternal chest pressure over the past few months associated with LE edema. Pt reports cardiologist told her that he wanted to provide her with medical management only, and that she would not be a candidate for further stent/cath. Denies diarrhea, constipation, abdominal pain, dysuria, hematuria, n/v.

## 2022-11-07 ENCOUNTER — EMERGENCY (EMERGENCY)
Facility: HOSPITAL | Age: 82
LOS: 1 days | Discharge: HOME | End: 2022-11-07
Attending: EMERGENCY MEDICINE
Payer: MEDICARE

## 2022-11-07 VITALS
HEART RATE: 65 BPM | SYSTOLIC BLOOD PRESSURE: 118 MMHG | WEIGHT: 106.92 LBS | OXYGEN SATURATION: 99 % | HEIGHT: 60 IN | TEMPERATURE: 98 F | RESPIRATION RATE: 20 BRPM | DIASTOLIC BLOOD PRESSURE: 71 MMHG

## 2022-11-07 LAB
ALBUMIN SERPL ELPH-MCNC: 5 G/DL — SIGNIFICANT CHANGE UP (ref 3.3–5)
ALP SERPL-CCNC: 62 U/L — SIGNIFICANT CHANGE UP (ref 40–120)
ALT FLD-CCNC: 11 U/L — SIGNIFICANT CHANGE UP (ref 10–45)
ANION GAP SERPL CALC-SCNC: 15 MMOL/L — SIGNIFICANT CHANGE UP (ref 5–17)
AST SERPL-CCNC: 27 U/L — SIGNIFICANT CHANGE UP (ref 10–40)
BASE EXCESS BLDV CALC-SCNC: 0.2 MMOL/L — SIGNIFICANT CHANGE UP (ref -2–3)
BASOPHILS # BLD AUTO: 0.03 K/UL — SIGNIFICANT CHANGE UP (ref 0–0.2)
BASOPHILS NFR BLD AUTO: 0.7 % — SIGNIFICANT CHANGE UP (ref 0–2)
BILIRUB SERPL-MCNC: 0.4 MG/DL — SIGNIFICANT CHANGE UP (ref 0.2–1.2)
BUN SERPL-MCNC: 18 MG/DL — SIGNIFICANT CHANGE UP (ref 7–23)
CA-I SERPL-SCNC: 1.24 MMOL/L — SIGNIFICANT CHANGE UP (ref 1.15–1.33)
CALCIUM SERPL-MCNC: 10.5 MG/DL — SIGNIFICANT CHANGE UP (ref 8.4–10.5)
CHLORIDE BLDV-SCNC: 103 MMOL/L — SIGNIFICANT CHANGE UP (ref 96–108)
CHLORIDE SERPL-SCNC: 101 MMOL/L — SIGNIFICANT CHANGE UP (ref 96–108)
CO2 BLDV-SCNC: 24 MMOL/L — SIGNIFICANT CHANGE UP (ref 22–26)
CO2 SERPL-SCNC: 21 MMOL/L — LOW (ref 22–31)
CREAT SERPL-MCNC: 1.99 MG/DL — HIGH (ref 0.5–1.3)
EGFR: 25 ML/MIN/1.73M2 — LOW
EOSINOPHIL # BLD AUTO: 0.05 K/UL — SIGNIFICANT CHANGE UP (ref 0–0.5)
EOSINOPHIL NFR BLD AUTO: 1.2 % — SIGNIFICANT CHANGE UP (ref 0–6)
GAS PNL BLDV: 134 MMOL/L — LOW (ref 136–145)
GAS PNL BLDV: SIGNIFICANT CHANGE UP
GAS PNL BLDV: SIGNIFICANT CHANGE UP
GLUCOSE BLDV-MCNC: 96 MG/DL — SIGNIFICANT CHANGE UP (ref 70–99)
GLUCOSE SERPL-MCNC: 92 MG/DL — SIGNIFICANT CHANGE UP (ref 70–99)
HCO3 BLDV-SCNC: 24 MMOL/L — SIGNIFICANT CHANGE UP (ref 22–29)
HCT VFR BLD CALC: 37.3 % — SIGNIFICANT CHANGE UP (ref 34.5–45)
HCT VFR BLDA CALC: 40 % — SIGNIFICANT CHANGE UP (ref 34.5–46.5)
HGB BLD CALC-MCNC: 13.2 G/DL — SIGNIFICANT CHANGE UP (ref 11.7–16.1)
HGB BLD-MCNC: 12.6 G/DL — SIGNIFICANT CHANGE UP (ref 11.5–15.5)
IMM GRANULOCYTES NFR BLD AUTO: 0.2 % — SIGNIFICANT CHANGE UP (ref 0–0.9)
LACTATE BLDV-MCNC: 2.1 MMOL/L — HIGH (ref 0.5–2)
LIDOCAIN IGE QN: 62 U/L — HIGH (ref 7–60)
LYMPHOCYTES # BLD AUTO: 1.28 K/UL — SIGNIFICANT CHANGE UP (ref 1–3.3)
LYMPHOCYTES # BLD AUTO: 30.3 % — SIGNIFICANT CHANGE UP (ref 13–44)
MCHC RBC-ENTMCNC: 31.2 PG — SIGNIFICANT CHANGE UP (ref 27–34)
MCHC RBC-ENTMCNC: 33.8 GM/DL — SIGNIFICANT CHANGE UP (ref 32–36)
MCV RBC AUTO: 92.3 FL — SIGNIFICANT CHANGE UP (ref 80–100)
MONOCYTES # BLD AUTO: 0.5 K/UL — SIGNIFICANT CHANGE UP (ref 0–0.9)
MONOCYTES NFR BLD AUTO: 11.8 % — SIGNIFICANT CHANGE UP (ref 2–14)
NEUTROPHILS # BLD AUTO: 2.36 K/UL — SIGNIFICANT CHANGE UP (ref 1.8–7.4)
NEUTROPHILS NFR BLD AUTO: 55.8 % — SIGNIFICANT CHANGE UP (ref 43–77)
NRBC # BLD: 0 /100 WBCS — SIGNIFICANT CHANGE UP (ref 0–0)
NT-PROBNP SERPL-SCNC: 529 PG/ML — HIGH (ref 0–300)
PCO2 BLDV: 33 MMHG — LOW (ref 39–42)
PH BLDV: 7.46 — HIGH (ref 7.32–7.43)
PLATELET # BLD AUTO: 215 K/UL — SIGNIFICANT CHANGE UP (ref 150–400)
PO2 BLDV: 24 MMHG — LOW (ref 25–45)
POTASSIUM BLDV-SCNC: 4.5 MMOL/L — SIGNIFICANT CHANGE UP (ref 3.5–5.1)
POTASSIUM SERPL-MCNC: 4.3 MMOL/L — SIGNIFICANT CHANGE UP (ref 3.5–5.3)
POTASSIUM SERPL-SCNC: 4.3 MMOL/L — SIGNIFICANT CHANGE UP (ref 3.5–5.3)
PROT SERPL-MCNC: 8.5 G/DL — HIGH (ref 6–8.3)
RBC # BLD: 4.04 M/UL — SIGNIFICANT CHANGE UP (ref 3.8–5.2)
RBC # FLD: 14.5 % — SIGNIFICANT CHANGE UP (ref 10.3–14.5)
SAO2 % BLDV: 37.3 % — LOW (ref 67–88)
SODIUM SERPL-SCNC: 137 MMOL/L — SIGNIFICANT CHANGE UP (ref 135–145)
TROPONIN T, HIGH SENSITIVITY RESULT: 16 NG/L — SIGNIFICANT CHANGE UP (ref 0–51)
WBC # BLD: 4.23 K/UL — SIGNIFICANT CHANGE UP (ref 3.8–10.5)
WBC # FLD AUTO: 4.23 K/UL — SIGNIFICANT CHANGE UP (ref 3.8–10.5)

## 2022-11-07 PROCEDURE — 96375 TX/PRO/DX INJ NEW DRUG ADDON: CPT

## 2022-11-07 PROCEDURE — 84295 ASSAY OF SERUM SODIUM: CPT

## 2022-11-07 PROCEDURE — 80053 COMPREHEN METABOLIC PANEL: CPT

## 2022-11-07 PROCEDURE — 82803 BLOOD GASES ANY COMBINATION: CPT

## 2022-11-07 PROCEDURE — 36415 COLL VENOUS BLD VENIPUNCTURE: CPT

## 2022-11-07 PROCEDURE — 74176 CT ABD & PELVIS W/O CONTRAST: CPT | Mod: MA

## 2022-11-07 PROCEDURE — 84132 ASSAY OF SERUM POTASSIUM: CPT

## 2022-11-07 PROCEDURE — 85018 HEMOGLOBIN: CPT

## 2022-11-07 PROCEDURE — 71045 X-RAY EXAM CHEST 1 VIEW: CPT | Mod: 26

## 2022-11-07 PROCEDURE — 99285 EMERGENCY DEPT VISIT HI MDM: CPT | Mod: 25

## 2022-11-07 PROCEDURE — 83880 ASSAY OF NATRIURETIC PEPTIDE: CPT

## 2022-11-07 PROCEDURE — 96374 THER/PROPH/DIAG INJ IV PUSH: CPT

## 2022-11-07 PROCEDURE — 82435 ASSAY OF BLOOD CHLORIDE: CPT

## 2022-11-07 PROCEDURE — 85014 HEMATOCRIT: CPT

## 2022-11-07 PROCEDURE — 85025 COMPLETE CBC W/AUTO DIFF WBC: CPT

## 2022-11-07 PROCEDURE — 83690 ASSAY OF LIPASE: CPT

## 2022-11-07 PROCEDURE — 71045 X-RAY EXAM CHEST 1 VIEW: CPT

## 2022-11-07 PROCEDURE — 99285 EMERGENCY DEPT VISIT HI MDM: CPT | Mod: GC

## 2022-11-07 PROCEDURE — U0005: CPT

## 2022-11-07 PROCEDURE — 84484 ASSAY OF TROPONIN QUANT: CPT

## 2022-11-07 PROCEDURE — 93005 ELECTROCARDIOGRAM TRACING: CPT

## 2022-11-07 PROCEDURE — U0003: CPT

## 2022-11-07 PROCEDURE — 82947 ASSAY GLUCOSE BLOOD QUANT: CPT

## 2022-11-07 PROCEDURE — 83605 ASSAY OF LACTIC ACID: CPT

## 2022-11-07 PROCEDURE — 82330 ASSAY OF CALCIUM: CPT

## 2022-11-07 RX ORDER — ACETAMINOPHEN 500 MG
975 TABLET ORAL ONCE
Refills: 0 | Status: COMPLETED | OUTPATIENT
Start: 2022-11-07 | End: 2022-11-07

## 2022-11-07 RX ORDER — FAMOTIDINE 10 MG/ML
20 INJECTION INTRAVENOUS ONCE
Refills: 0 | Status: COMPLETED | OUTPATIENT
Start: 2022-11-07 | End: 2022-11-07

## 2022-11-07 RX ORDER — SUCRALFATE 1 G
1 TABLET ORAL ONCE
Refills: 0 | Status: COMPLETED | OUTPATIENT
Start: 2022-11-07 | End: 2022-11-07

## 2022-11-07 RX ADMIN — Medication 975 MILLIGRAM(S): at 21:20

## 2022-11-07 RX ADMIN — FAMOTIDINE 20 MILLIGRAM(S): 10 INJECTION INTRAVENOUS at 22:51

## 2022-11-07 RX ADMIN — Medication 1 MILLIGRAM(S): at 23:52

## 2022-11-07 RX ADMIN — Medication 1 GRAM(S): at 22:51

## 2022-11-07 NOTE — ED PROVIDER NOTE - NSFOLLOWUPINSTRUCTIONS_ED_ALL_ED_FT
You were evaluated in the ER and there is no evidence of an emergent medical condition. You are stable for discharge    Follow up with your PCP.  Follow up with a gastroenterologist   Follow up with a surgeon to have your gallbladder evaluated.      -If you do not have a PMD, please call [phone] to find one convenient for you or call our clinic at [phone].     Continue home medications as prescribed    Return to the ER for new or worsening symptoms, severe chest pain or difficulty breathing, passing out, new fevers, uncontrollable nausea and vomiting, or for any concern you would like evaluated.

## 2022-11-07 NOTE — ED PROVIDER NOTE - CLINICAL SUMMARY MEDICAL DECISION MAKING FREE TEXT BOX
Patient with upper abd pain and report of gradual reddening of stool with GI appt upcoming. Seen before for same, had RUQUS without s/o acute khadar. Concern for PUD/gastritis/pancreatitis, low suspicion cardiac given postprandial nature, relief with GI meds. Will get CT to further characterize, symptom control. DC if elsa PO.

## 2022-11-07 NOTE — ED PROVIDER NOTE - NSFOLLOWUPCLINICS_GEN_ALL_ED_FT
[facility]  General Surgery  [address] [place] [address] - [address] [place] [address]  Phone: [phone]  Fax:

## 2022-11-07 NOTE — ED PROVIDER NOTE - PHYSICAL EXAMINATION
Vitals: I have reviewed the patients vital signs  General: Well dressed, well appearing, no acute distress  HEENT: Atraumatic, normocephalic, airway patent  Eyes: EOMI, tracking appropriately  Neck: no tracheal deviation, no JVD  Chest/Lungs: no trauma, symmetric chest rise, speaking in complete sentences, no WOB  Heart: skin and extremities well perfused, regular rate and rhythm  Neuro: A+Ox3, ambulating without difficulty, CN grossly intact  MSK: strength at baseline in all extremities, no muscle wasting or atrophy  Skin: no cyanosis, no jaundice, no new emergent lesions   Abd: minimal epigastric and RUQ tenderness

## 2022-11-07 NOTE — ED PROVIDER NOTE - OBJECTIVE STATEMENT
83 y/o F with CAD s/p CABG, no abd surgeries, here now with upper abdominal - not chest as noted by triage, pressure after eating, uncomfortable, has had this for several months on and off, has a GI but no appt for several weeks. Takes carafate and mylanta with some relief but lately has not been helping. Also with some red discoloration in stool. No vomiting, some nausea. No fevers. No CP, SOB, syncope. 83 y/o F with CAD s/p CABG, no abd surgeries, here now with upper abdominal - not chest as noted by triage, pressure after eating, uncomfortable, has had this for several months on and off, has a GI but no appt for several weeks. Takes carafate and mylanta with some relief but lately has not been helping. Also with some red discoloration in stool. No vomiting, some nausea. No fevers. No CP, SOB, syncope.    Attendinyo female presents with upper abdominal pain that is worse after eating.  no fever or chills.  tolerating po but pain with food.  was here for similar complaints last week.

## 2022-11-07 NOTE — ED ADULT TRIAGE NOTE - HAVE YOU HAD COVID IN THE LAST 60 DAYS?
Pt refused physical assessment at this time.  Would like to sleep longer.  RN will attempt again at 0500.   No

## 2022-11-07 NOTE — ED PROVIDER NOTE - RAPID ASSESSMENT
81 y/o F presents to the ED c/o worsening chest pressure/epigastric pain. Pt further reports loose stools w/ blood in stool. Pt has been taking Pepcid and Mylanta w/ no relief. Denies any other acute complaints. Pt is nontoxic appearing in triage.    Yolette AMEZQUITA) have documented this rapid assessment note under the dictation of Miguel Donaldson)  which has been reviewed and affirmed to be accurate. Patient was seen as a QPA patient. The patient will be seen and further worked up in the main emergency department and their care will be completed by the main emergency department team along with a thorough physical exam. Receiving team will follow up on labs, analgesia, any clinical imaging, reassess and disposition as clinically indicated, all decisions regarding the progression of care will be made at their discretion. 83 y/o F presents to the ED c/o worsening chest pressure/epigastric pain. Pt further reports loose stools w/ blood in stool. Pt has been taking Pepcid and Mylanta w/ no relief. Denies any other acute complaints. Pt is nontoxic appearing in triage.    Yolette AMEZQUITA (Scribmadeline) have documented this rapid assessment note under the dictation of Miguel Donaldson (PA)  which has been reviewed and affirmed to be accurate. Patient was seen as a QPA patient. The patient will be seen and further worked up in the main emergency department and their care will be completed by the main emergency department team along with a thorough physical exam. Receiving team will follow up on labs, analgesia, any clinical imaging, reassess and disposition as clinically indicated, all decisions regarding the progression of care will be made at their discretion.    Miguel Donaldson (PA) note: This scribe's documentation has been prepared under my direction and personally reviewed by me. The patient will be seen and further worked up in the main emergency department and their care will be completed by the main emergency department team along with a thorough physical exam. Receiving team will follow up on labs, analgesia, any clinical imaging, reassess and disposition as clinically indicated, all decisions regarding the progression of care will be made at their discretion.

## 2022-11-07 NOTE — ED PROVIDER NOTE - PATIENT PORTAL LINK FT
You can access the FollowMyHealth Patient Portal offered by [facility] by registering at the following website: [email or web address]. By joining [employer]’s FollowMyHealth portal, you will also be able to view your health information using other applications (apps) compatible with our system.

## 2022-11-07 NOTE — ED PROVIDER NOTE - PROGRESS NOTE DETAILS
Hector: Due to anxiety pt medicated with 1mg ativan for ct without issue. pt tolerated PO, scan without actionable findings, labs remarkable only for ckd. has GI appt, but is several weeks from now. will put into followup board to try and get it moved up. Will give surg f/u to have gallbladder evaluated. Results explained, printed and given to patient, patient given discharge instructions and information for follow up and return precautions.

## 2022-11-08 VITALS
RESPIRATION RATE: 16 BRPM | HEART RATE: 62 BPM | OXYGEN SATURATION: 97 % | DIASTOLIC BLOOD PRESSURE: 87 MMHG | TEMPERATURE: 98 F | SYSTOLIC BLOOD PRESSURE: 139 MMHG

## 2022-11-08 LAB — SARS-COV-2 RNA SPEC QL NAA+PROBE: SIGNIFICANT CHANGE UP

## 2022-11-08 PROCEDURE — 74176 CT ABD & PELVIS W/O CONTRAST: CPT | Mod: 26,MA

## 2022-11-21 NOTE — ED ADULT NURSE NOTE - DISCHARGE DATE/TIME
Abdomen , soft, nontender, nondistended , no guarding or rigidity , no masses palpable , normal bowel sounds, old surgical scars were noted Liver and Spleen , no hepatosplenomegaly Rectal deferred
12-Jul-2019 21:20

## 2022-11-23 ENCOUNTER — INPATIENT (INPATIENT)
Facility: HOSPITAL | Age: 82
LOS: 5 days | Discharge: HOME | DRG: 378 | End: 2022-11-29
Attending: INTERNAL MEDICINE | Admitting: STUDENT IN AN ORGANIZED HEALTH CARE EDUCATION/TRAINING PROGRAM
Payer: MEDICARE

## 2022-11-23 VITALS
RESPIRATION RATE: 20 BRPM | WEIGHT: 110.01 LBS | HEIGHT: 60 IN | TEMPERATURE: 98 F | DIASTOLIC BLOOD PRESSURE: 79 MMHG | SYSTOLIC BLOOD PRESSURE: 156 MMHG | HEART RATE: 59 BPM | OXYGEN SATURATION: 99 %

## 2022-11-23 DIAGNOSIS — K62.5 HEMORRHAGE OF ANUS AND RECTUM: ICD-10-CM

## 2022-11-23 LAB
ALBUMIN SERPL ELPH-MCNC: 4.4 G/DL — SIGNIFICANT CHANGE UP (ref 3.3–5)
ALP SERPL-CCNC: 50 U/L — SIGNIFICANT CHANGE UP (ref 40–120)
ALT FLD-CCNC: 9 U/L — LOW (ref 10–45)
ANION GAP SERPL CALC-SCNC: 11 MMOL/L — SIGNIFICANT CHANGE UP (ref 5–17)
APTT BLD: 26 SEC — LOW (ref 27.5–35.5)
AST SERPL-CCNC: 25 U/L — SIGNIFICANT CHANGE UP (ref 10–40)
BASOPHILS # BLD AUTO: 0.02 K/UL — SIGNIFICANT CHANGE UP (ref 0–0.2)
BASOPHILS NFR BLD AUTO: 0.5 % — SIGNIFICANT CHANGE UP (ref 0–2)
BILIRUB SERPL-MCNC: 0.4 MG/DL — SIGNIFICANT CHANGE UP (ref 0.2–1.2)
BUN SERPL-MCNC: 19 MG/DL — SIGNIFICANT CHANGE UP (ref 7–23)
CALCIUM SERPL-MCNC: 9.8 MG/DL — SIGNIFICANT CHANGE UP (ref 8.4–10.5)
CHLORIDE SERPL-SCNC: 103 MMOL/L — SIGNIFICANT CHANGE UP (ref 96–108)
CO2 SERPL-SCNC: 23 MMOL/L — SIGNIFICANT CHANGE UP (ref 22–31)
CREAT SERPL-MCNC: 1.87 MG/DL — HIGH (ref 0.5–1.3)
EGFR: 27 ML/MIN/1.73M2 — LOW
EOSINOPHIL # BLD AUTO: 0.06 K/UL — SIGNIFICANT CHANGE UP (ref 0–0.5)
EOSINOPHIL NFR BLD AUTO: 1.4 % — SIGNIFICANT CHANGE UP (ref 0–6)
FLUAV AG NPH QL: SIGNIFICANT CHANGE UP
FLUBV AG NPH QL: SIGNIFICANT CHANGE UP
GLUCOSE SERPL-MCNC: 105 MG/DL — HIGH (ref 70–99)
HCT VFR BLD CALC: 35.9 % — SIGNIFICANT CHANGE UP (ref 34.5–45)
HGB BLD-MCNC: 11.9 G/DL — SIGNIFICANT CHANGE UP (ref 11.5–15.5)
IMM GRANULOCYTES NFR BLD AUTO: 0.5 % — SIGNIFICANT CHANGE UP (ref 0–0.9)
INR BLD: 1.03 RATIO — SIGNIFICANT CHANGE UP (ref 0.88–1.16)
LYMPHOCYTES # BLD AUTO: 0.72 K/UL — LOW (ref 1–3.3)
LYMPHOCYTES # BLD AUTO: 16.4 % — SIGNIFICANT CHANGE UP (ref 13–44)
MCHC RBC-ENTMCNC: 31.5 PG — SIGNIFICANT CHANGE UP (ref 27–34)
MCHC RBC-ENTMCNC: 33.1 GM/DL — SIGNIFICANT CHANGE UP (ref 32–36)
MCV RBC AUTO: 95 FL — SIGNIFICANT CHANGE UP (ref 80–100)
MONOCYTES # BLD AUTO: 0.45 K/UL — SIGNIFICANT CHANGE UP (ref 0–0.9)
MONOCYTES NFR BLD AUTO: 10.2 % — SIGNIFICANT CHANGE UP (ref 2–14)
NEUTROPHILS # BLD AUTO: 3.13 K/UL — SIGNIFICANT CHANGE UP (ref 1.8–7.4)
NEUTROPHILS NFR BLD AUTO: 71 % — SIGNIFICANT CHANGE UP (ref 43–77)
NRBC # BLD: 0 /100 WBCS — SIGNIFICANT CHANGE UP (ref 0–0)
OB PNL STL: POSITIVE
PLATELET # BLD AUTO: 190 K/UL — SIGNIFICANT CHANGE UP (ref 150–400)
POTASSIUM SERPL-MCNC: 4.8 MMOL/L — SIGNIFICANT CHANGE UP (ref 3.5–5.3)
POTASSIUM SERPL-SCNC: 4.8 MMOL/L — SIGNIFICANT CHANGE UP (ref 3.5–5.3)
PROT SERPL-MCNC: 7.5 G/DL — SIGNIFICANT CHANGE UP (ref 6–8.3)
PROTHROM AB SERPL-ACNC: 12 SEC — SIGNIFICANT CHANGE UP (ref 10.5–13.4)
RBC # BLD: 3.78 M/UL — LOW (ref 3.8–5.2)
RBC # FLD: 14.7 % — HIGH (ref 10.3–14.5)
RSV RNA NPH QL NAA+NON-PROBE: SIGNIFICANT CHANGE UP
SARS-COV-2 RNA SPEC QL NAA+PROBE: SIGNIFICANT CHANGE UP
SODIUM SERPL-SCNC: 137 MMOL/L — SIGNIFICANT CHANGE UP (ref 135–145)
WBC # BLD: 4.4 K/UL — SIGNIFICANT CHANGE UP (ref 3.8–10.5)
WBC # FLD AUTO: 4.4 K/UL — SIGNIFICANT CHANGE UP (ref 3.8–10.5)

## 2022-11-23 PROCEDURE — 86077 PHYS BLOOD BANK SERV XMATCH: CPT

## 2022-11-23 PROCEDURE — 93010 ELECTROCARDIOGRAM REPORT: CPT

## 2022-11-23 PROCEDURE — 71045 X-RAY EXAM CHEST 1 VIEW: CPT | Mod: 26

## 2022-11-23 PROCEDURE — 99285 EMERGENCY DEPT VISIT HI MDM: CPT | Mod: GC

## 2022-11-23 NOTE — ED PROVIDER NOTE - ATTENDING CONTRIBUTION TO CARE
Attending MD Robertson: I personally have seen and examined this patient.  Resident note reviewed and agree on plan of care and except where noted.  See below for details.     Seen in [place] [facility], accompanied by son    82F with PMH/PSH including HTN, CAD s/p CABG on Plavix, CKD, presents to the ED with episode of bright red blood per rectum today.  Reports that since 2019 has been having GI issues.  Report in 2019 was having bloating and was supposed to have endoscopy.  Reports felt better so did not have, then pandemic happened.  Reports has been to Emergency Department recently for similar complaints.  Reports has noted small amount of blood in stools previously but today had bloody BM.  Denies endoscopy/colonoscopy recently.  Reports has appointment for GI but reports having difficulty obtaining cardiac clearance.  Reports also feeling chest pressure today, denies shortness of breath.  Denies fevers, chills.  Denies vomiting.  Denies dysuria, hematuria, change in urinary habits including frequency, urgency.  Reports 6-7 lbs weight loss over months.    Exam:   General: NAD, AAOx3 (name, place, initially said 2021 but promptly self corrected to 2022)  HENT: head NCAT, airway patent  Eyes: PERRL  Lungs: lungs CTAB with good inspiratory effort, no wheezing, no rhonchi, no rales  Cardiac: +S1S2, no obvious m/r/g  GI: abdomen soft with +BS, NT, ND, rectal chaperoned Dr. Fu  : no CVAT  MSK: FROM at neck, no tenderness to midline palpation  Neuro: moving all extremities spontaneously, sensory grossly intact, no gross neuro deficits  Psych: normal mood and affect     A/P: 82F with bright red blood per rectum, will obtain labs, will eval for drop in Hb, will obtain FOB, reviewed EMR and noted CT from 11/8/22, will obtain trop, EKG, CXR for reported chest pressure, will likely need admission

## 2022-11-23 NOTE — ED PROVIDER NOTE - PROGRESS NOTE DETAILS
Jose Fu MD PGY-2  Labs stable from prior, rectal w/o obvious bleeding. FOBT sent. Trop stable from prior.   - TBA Unattached (PCP with [facility])

## 2022-11-23 NOTE — ED ADULT NURSE NOTE - NS ED NOTE ABUSE SUSPICION NEGLECT YN
Echocardiogram completed, paperwork received and documentation given to Dr Juan for interpretation   No

## 2022-11-23 NOTE — ED PROVIDER NOTE - OBJECTIVE STATEMENT
82F with h/o CAD s/p CABG (last stent 2009 per son at bedside), HTN p/w 1.5 mo of difficulty with stooling and today had a larger episode of BRBPR and lightheadedness. Pt has been struggling with trace blood with BMs for ~1.5mo & was seen 11/8 in the ED for same. Hasn't had colonoscopy/endoscopy for years. Family has been struggling with getting cardiac clearance as an outpatient for any scopes. She denies any pain.     Meds:  - Carvedilol 25mg PO QD  - Ranolazine ER 500mg PO BID  - Vascepa 1g PO BID  - Dapagliflozin (Farxiga) 5mg PO QD  - Pioglitazone 15mg PO QD  - Spironolactone 25mg PO QD  - Amlodipine 5mg PO QD  - Clopidogrel 75mg PO QD  - Mirtazapine 30mg PO QD  - Restasis 0.05% 1gtt OU BID  - Isosorbide mononitrate ER 60mg PO QD  - Breo-Ellipta 100-25ug 1puff INH QD  - Rosuvastatin 40mg PO QD

## 2022-11-23 NOTE — ED PROVIDER NOTE - NSICDXPASTMEDICALHX_GEN_ALL_CORE_FT
PAST MEDICAL HISTORY:  Cataracts, bilateral     Coronary artery disease s/p PCI with one stent to LAD 2009, CABG    Depression     Dyslipidemia     Hypertension     Kidney stones     MRSA infection nasal abscess    Myocardial infarct     Prediabetes

## 2022-11-23 NOTE — ED PROVIDER NOTE - RAPID ASSESSMENT
83 y/o F presents to the ED c/o one episode of BRB in stool today. Pt was seen here on 11/8 for similar episode. Pt reports abdominal pain/burning and bloating after eating. +Lightheadedness. No prior hx of colonoscopy or endoscopy. Denies any other acute complaints. Drug allergies: ASA. Daily meds: Plavix. Pt is well appearing in triage.     Yolette AMEZQUITA (Indir[employer]) have documented this rapid assessment note under the dictation of Willow Ospina (PA)  which has been reviewed and affirmed to be accurate. Patient was seen as a QPA patient. The patient will be seen and further worked up in the main emergency department and their care will be completed by the main emergency department team along with a thorough physical exam. Receiving team will follow up on labs, analgesia, any clinical imaging, reassess and disposition as clinically indicated, all decisions regarding the progression of care will be made at their discretion. 83 y/o F PMHx CABG on plavix no ASA, presents to the ED c/o one episode of bright red bloody stool today. Pt was seen here on 11/8 for similar episode with blood streaking. Pt reports abdominal pain/burning and bloating after eating. +Lightheadedness. Has lack of appetite. Never had colonoscopy or endoscopy. Patient unable to get cardiac clearance for colonoscopy/endoscopy     Yolette AMEZQUITA (Indira) have documented this rapid assessment note under the dictation of Willow Ospina)  which has been reviewed and affirmed to be accurate. Patient was seen as a QPA patient. The patient will be seen and further worked up in the main emergency department and their care will be completed by the main emergency department team along with a thorough physical exam. Receiving team will follow up on labs, analgesia, any clinical imaging, reassess and disposition as clinically indicated, all decisions regarding the progression of care will be made at their discretion.    CATERINA AMEZQUITA, personally performed the service described in the documentation recorded by the scribe in my presence, and it accurately and completely records my words and actions.

## 2022-11-23 NOTE — ED PROVIDER NOTE - CLINICAL SUMMARY MEDICAL DECISION MAKING FREE TEXT BOX
82F with h/o CAD (PCI to LAD 2009) s/p CABG, CKD p/w an episode of large BRBPR today a/w lightheadedness. Pt HDS so most likely LGIB, but pt also reporting progressive decreased PO tolerance & pain after meals. May have chronic mesenteric ischemia, PUD, AVMs, bleeding diveritulosis, or other LGI pathology. Imaging previously unremarkable but performed w/o contrast d/t persistently elevated cr likely 2/2 CKD.   - Labs, T&S, Coags  - ECG  - TBA     Cardiologist: Dr. Robbins ([facility])  PCP: Dr. Calixto Kitchen ([facility])

## 2022-11-23 NOTE — ED ADULT TRIAGE NOTE - CHIEF COMPLAINT QUOTE
Blood in the stool starting today; similar episode 11/8. Reports having abdominal pain, bloating after eating. Also reports feeling lightheaded

## 2022-11-24 DIAGNOSIS — I50.9 HEART FAILURE, UNSPECIFIED: ICD-10-CM

## 2022-11-24 DIAGNOSIS — K92.2 GASTROINTESTINAL HEMORRHAGE, UNSPECIFIED: ICD-10-CM

## 2022-11-24 DIAGNOSIS — N18.4 CHRONIC KIDNEY DISEASE, STAGE 4 (SEVERE): ICD-10-CM

## 2022-11-24 DIAGNOSIS — E78.5 HYPERLIPIDEMIA, UNSPECIFIED: ICD-10-CM

## 2022-11-24 DIAGNOSIS — Z29.9 ENCOUNTER FOR PROPHYLACTIC MEASURES, UNSPECIFIED: ICD-10-CM

## 2022-11-24 DIAGNOSIS — I10 ESSENTIAL (PRIMARY) HYPERTENSION: ICD-10-CM

## 2022-11-24 DIAGNOSIS — I25.10 ATHEROSCLEROTIC HEART DISEASE OF NATIVE CORONARY ARTERY WITHOUT ANGINA PECTORIS: ICD-10-CM

## 2022-11-24 LAB
A1C WITH ESTIMATED AVERAGE GLUCOSE RESULT: 5.6 % — SIGNIFICANT CHANGE UP (ref 4–5.6)
ALBUMIN SERPL ELPH-MCNC: 3.8 G/DL — SIGNIFICANT CHANGE UP (ref 3.3–5)
ALP SERPL-CCNC: 45 U/L — SIGNIFICANT CHANGE UP (ref 40–120)
ALT FLD-CCNC: 9 U/L — LOW (ref 10–45)
ANION GAP SERPL CALC-SCNC: 12 MMOL/L — SIGNIFICANT CHANGE UP (ref 5–17)
AST SERPL-CCNC: 21 U/L — SIGNIFICANT CHANGE UP (ref 10–40)
BILIRUB SERPL-MCNC: 0.2 MG/DL — SIGNIFICANT CHANGE UP (ref 0.2–1.2)
BUN SERPL-MCNC: 18 MG/DL — SIGNIFICANT CHANGE UP (ref 7–23)
CALCIUM SERPL-MCNC: 9.1 MG/DL — SIGNIFICANT CHANGE UP (ref 8.4–10.5)
CHLORIDE SERPL-SCNC: 107 MMOL/L — SIGNIFICANT CHANGE UP (ref 96–108)
CHOLEST SERPL-MCNC: 174 MG/DL — SIGNIFICANT CHANGE UP
CO2 SERPL-SCNC: 22 MMOL/L — SIGNIFICANT CHANGE UP (ref 22–31)
CREAT SERPL-MCNC: 1.8 MG/DL — HIGH (ref 0.5–1.3)
EGFR: 28 ML/MIN/1.73M2 — LOW
ESTIMATED AVERAGE GLUCOSE: 114 MG/DL — SIGNIFICANT CHANGE UP (ref 68–114)
GLUCOSE BLDC GLUCOMTR-MCNC: 108 MG/DL — HIGH (ref 70–99)
GLUCOSE BLDC GLUCOMTR-MCNC: 124 MG/DL — HIGH (ref 70–99)
GLUCOSE BLDC GLUCOMTR-MCNC: 70 MG/DL — SIGNIFICANT CHANGE UP (ref 70–99)
GLUCOSE SERPL-MCNC: 152 MG/DL — HIGH (ref 70–99)
HCT VFR BLD CALC: 32.3 % — LOW (ref 34.5–45)
HCT VFR BLD CALC: 33.6 % — LOW (ref 34.5–45)
HDLC SERPL-MCNC: 66 MG/DL — SIGNIFICANT CHANGE UP
HGB BLD-MCNC: 10.5 G/DL — LOW (ref 11.5–15.5)
HGB BLD-MCNC: 11.1 G/DL — LOW (ref 11.5–15.5)
LIPID PNL WITH DIRECT LDL SERPL: 88 MG/DL — SIGNIFICANT CHANGE UP
MCHC RBC-ENTMCNC: 31.4 PG — SIGNIFICANT CHANGE UP (ref 27–34)
MCHC RBC-ENTMCNC: 31.5 PG — SIGNIFICANT CHANGE UP (ref 27–34)
MCHC RBC-ENTMCNC: 32.5 GM/DL — SIGNIFICANT CHANGE UP (ref 32–36)
MCHC RBC-ENTMCNC: 33 GM/DL — SIGNIFICANT CHANGE UP (ref 32–36)
MCV RBC AUTO: 95.5 FL — SIGNIFICANT CHANGE UP (ref 80–100)
MCV RBC AUTO: 96.7 FL — SIGNIFICANT CHANGE UP (ref 80–100)
NON HDL CHOLESTEROL: 108 MG/DL — SIGNIFICANT CHANGE UP
NRBC # BLD: 0 /100 WBCS — SIGNIFICANT CHANGE UP (ref 0–0)
NRBC # BLD: 0 /100 WBCS — SIGNIFICANT CHANGE UP (ref 0–0)
PLATELET # BLD AUTO: 161 K/UL — SIGNIFICANT CHANGE UP (ref 150–400)
PLATELET # BLD AUTO: 177 K/UL — SIGNIFICANT CHANGE UP (ref 150–400)
POTASSIUM SERPL-MCNC: 3.9 MMOL/L — SIGNIFICANT CHANGE UP (ref 3.5–5.3)
POTASSIUM SERPL-SCNC: 3.9 MMOL/L — SIGNIFICANT CHANGE UP (ref 3.5–5.3)
PROT SERPL-MCNC: 6.5 G/DL — SIGNIFICANT CHANGE UP (ref 6–8.3)
RBC # BLD: 3.34 M/UL — LOW (ref 3.8–5.2)
RBC # BLD: 3.52 M/UL — LOW (ref 3.8–5.2)
RBC # FLD: 14.6 % — HIGH (ref 10.3–14.5)
RBC # FLD: 14.6 % — HIGH (ref 10.3–14.5)
SODIUM SERPL-SCNC: 141 MMOL/L — SIGNIFICANT CHANGE UP (ref 135–145)
TRIGL SERPL-MCNC: 102 MG/DL — SIGNIFICANT CHANGE UP
WBC # BLD: 4.07 K/UL — SIGNIFICANT CHANGE UP (ref 3.8–10.5)
WBC # BLD: 4.15 K/UL — SIGNIFICANT CHANGE UP (ref 3.8–10.5)
WBC # FLD AUTO: 4.07 K/UL — SIGNIFICANT CHANGE UP (ref 3.8–10.5)
WBC # FLD AUTO: 4.15 K/UL — SIGNIFICANT CHANGE UP (ref 3.8–10.5)

## 2022-11-24 PROCEDURE — 12345: CPT | Mod: NC

## 2022-11-24 PROCEDURE — 99223 1ST HOSP IP/OBS HIGH 75: CPT

## 2022-11-24 RX ORDER — LANOLIN ALCOHOL/MO/W.PET/CERES
3 CREAM (GRAM) TOPICAL AT BEDTIME
Refills: 0 | Status: DISCONTINUED | OUTPATIENT
Start: 2022-11-24 | End: 2022-11-29

## 2022-11-24 RX ORDER — DAPAGLIFLOZIN 10 MG/1
5 TABLET, FILM COATED ORAL DAILY
Refills: 0 | Status: DISCONTINUED | OUTPATIENT
Start: 2022-11-24 | End: 2022-11-29

## 2022-11-24 RX ORDER — SPIRONOLACTONE 25 MG/1
25 TABLET, FILM COATED ORAL DAILY
Refills: 0 | Status: DISCONTINUED | OUTPATIENT
Start: 2022-11-24 | End: 2022-11-29

## 2022-11-24 RX ORDER — ONDANSETRON 8 MG/1
4 TABLET, FILM COATED ORAL EVERY 8 HOURS
Refills: 0 | Status: DISCONTINUED | OUTPATIENT
Start: 2022-11-24 | End: 2022-11-29

## 2022-11-24 RX ORDER — ISOSORBIDE MONONITRATE 60 MG/1
60 TABLET, EXTENDED RELEASE ORAL DAILY
Refills: 0 | Status: DISCONTINUED | OUTPATIENT
Start: 2022-11-24 | End: 2022-11-29

## 2022-11-24 RX ORDER — ATORVASTATIN CALCIUM 80 MG/1
80 TABLET, FILM COATED ORAL AT BEDTIME
Refills: 0 | Status: DISCONTINUED | OUTPATIENT
Start: 2022-11-24 | End: 2022-11-29

## 2022-11-24 RX ORDER — CARVEDILOL PHOSPHATE 80 MG/1
25 CAPSULE, EXTENDED RELEASE ORAL EVERY 12 HOURS
Refills: 0 | Status: DISCONTINUED | OUTPATIENT
Start: 2022-11-24 | End: 2022-11-27

## 2022-11-24 RX ORDER — RANOLAZINE 500 MG/1
1 TABLET, FILM COATED, EXTENDED RELEASE ORAL
Qty: 0 | Refills: 0 | DISCHARGE

## 2022-11-24 RX ORDER — RANOLAZINE 500 MG/1
500 TABLET, FILM COATED, EXTENDED RELEASE ORAL
Refills: 0 | Status: DISCONTINUED | OUTPATIENT
Start: 2022-11-24 | End: 2022-11-29

## 2022-11-24 RX ORDER — BUDESONIDE AND FORMOTEROL FUMARATE DIHYDRATE 160; 4.5 UG/1; UG/1
2 AEROSOL RESPIRATORY (INHALATION)
Refills: 0 | Status: DISCONTINUED | OUTPATIENT
Start: 2022-11-24 | End: 2022-11-29

## 2022-11-24 RX ORDER — FLUTICASONE FUROATE AND VILANTEROL TRIFENATATE 100; 25 UG/1; UG/1
1 POWDER RESPIRATORY (INHALATION)
Qty: 0 | Refills: 0 | DISCHARGE

## 2022-11-24 RX ORDER — SODIUM CHLORIDE 9 MG/ML
500 INJECTION INTRAMUSCULAR; INTRAVENOUS; SUBCUTANEOUS ONCE
Refills: 0 | Status: COMPLETED | OUTPATIENT
Start: 2022-11-24 | End: 2022-11-24

## 2022-11-24 RX ORDER — DAPAGLIFLOZIN 10 MG/1
1 TABLET, FILM COATED ORAL
Qty: 0 | Refills: 0 | DISCHARGE

## 2022-11-24 RX ORDER — PANTOPRAZOLE SODIUM 20 MG/1
40 TABLET, DELAYED RELEASE ORAL EVERY 12 HOURS
Refills: 0 | Status: DISCONTINUED | OUTPATIENT
Start: 2022-11-24 | End: 2022-11-29

## 2022-11-24 RX ORDER — ACETAMINOPHEN 500 MG
650 TABLET ORAL EVERY 6 HOURS
Refills: 0 | Status: DISCONTINUED | OUTPATIENT
Start: 2022-11-24 | End: 2022-11-29

## 2022-11-24 RX ORDER — INFLUENZA VIRUS VACCINE 15; 15; 15; 15 UG/.5ML; UG/.5ML; UG/.5ML; UG/.5ML
0.7 SUSPENSION INTRAMUSCULAR ONCE
Refills: 0 | Status: DISCONTINUED | OUTPATIENT
Start: 2022-11-24 | End: 2022-11-29

## 2022-11-24 RX ORDER — SPIRONOLACTONE 25 MG/1
1 TABLET, FILM COATED ORAL
Qty: 0 | Refills: 0 | DISCHARGE

## 2022-11-24 RX ORDER — CYCLOSPORINE 0.5 MG/ML
1 EMULSION OPHTHALMIC
Qty: 0 | Refills: 0 | DISCHARGE

## 2022-11-24 RX ORDER — PIOGLITAZONE HYDROCHLORIDE 15 MG/1
1 TABLET ORAL
Qty: 0 | Refills: 0 | DISCHARGE

## 2022-11-24 RX ORDER — MIRTAZAPINE 45 MG/1
30 TABLET, ORALLY DISINTEGRATING ORAL DAILY
Refills: 0 | Status: DISCONTINUED | OUTPATIENT
Start: 2022-11-24 | End: 2022-11-25

## 2022-11-24 RX ORDER — ICOSAPENT ETHYL 500 MG/1
2 CAPSULE, LIQUID FILLED ORAL
Qty: 0 | Refills: 0 | DISCHARGE

## 2022-11-24 RX ORDER — AMLODIPINE BESYLATE 2.5 MG/1
5 TABLET ORAL DAILY
Refills: 0 | Status: DISCONTINUED | OUTPATIENT
Start: 2022-11-24 | End: 2022-11-27

## 2022-11-24 RX ADMIN — ISOSORBIDE MONONITRATE 60 MILLIGRAM(S): 60 TABLET, EXTENDED RELEASE ORAL at 11:18

## 2022-11-24 RX ADMIN — DAPAGLIFLOZIN 5 MILLIGRAM(S): 10 TABLET, FILM COATED ORAL at 15:29

## 2022-11-24 RX ADMIN — SPIRONOLACTONE 25 MILLIGRAM(S): 25 TABLET, FILM COATED ORAL at 05:55

## 2022-11-24 RX ADMIN — CARVEDILOL PHOSPHATE 25 MILLIGRAM(S): 80 CAPSULE, EXTENDED RELEASE ORAL at 18:08

## 2022-11-24 RX ADMIN — PANTOPRAZOLE SODIUM 40 MILLIGRAM(S): 20 TABLET, DELAYED RELEASE ORAL at 18:03

## 2022-11-24 RX ADMIN — RANOLAZINE 500 MILLIGRAM(S): 500 TABLET, FILM COATED, EXTENDED RELEASE ORAL at 18:03

## 2022-11-24 RX ADMIN — BUDESONIDE AND FORMOTEROL FUMARATE DIHYDRATE 2 PUFF(S): 160; 4.5 AEROSOL RESPIRATORY (INHALATION) at 05:56

## 2022-11-24 RX ADMIN — MIRTAZAPINE 30 MILLIGRAM(S): 45 TABLET, ORALLY DISINTEGRATING ORAL at 22:42

## 2022-11-24 RX ADMIN — AMLODIPINE BESYLATE 5 MILLIGRAM(S): 2.5 TABLET ORAL at 05:56

## 2022-11-24 RX ADMIN — SODIUM CHLORIDE 500 MILLILITER(S): 9 INJECTION INTRAMUSCULAR; INTRAVENOUS; SUBCUTANEOUS at 01:53

## 2022-11-24 RX ADMIN — Medication 30 MILLILITER(S): at 14:51

## 2022-11-24 RX ADMIN — Medication 1 MILLIGRAM(S): at 04:55

## 2022-11-24 RX ADMIN — ATORVASTATIN CALCIUM 80 MILLIGRAM(S): 80 TABLET, FILM COATED ORAL at 21:09

## 2022-11-24 RX ADMIN — PANTOPRAZOLE SODIUM 40 MILLIGRAM(S): 20 TABLET, DELAYED RELEASE ORAL at 05:55

## 2022-11-24 RX ADMIN — RANOLAZINE 500 MILLIGRAM(S): 500 TABLET, FILM COATED, EXTENDED RELEASE ORAL at 05:55

## 2022-11-24 NOTE — H&P ADULT - PROBLEM SELECTOR PROBLEM 4
Be able to verbalize an understanding of the association between substance abuse and mental health
Be able to acknowledge that substance abuse is a problem
Be able to verbalize an understanding of the association between substance abuse and mental health
Other...
HTN (hypertension)

## 2022-11-24 NOTE — H&P ADULT - HISTORY OF PRESENT ILLNESS
82F PMH CAD s/p 3vCABG, HTN, HLD, kidney stones, CKD, p/w 1.5 mo of difficulty with stooling and episode of BRBPR today and lightheadedness. Pt has been struggling with trace blood with BMs for ~1.5mo & was seen 11/8 in the ED for same.   No prior colonoscopy/endoscopy for years, Family having trouble getting cardiac clearance for scopes outpatient    Denies CP, SOB, abm, N/V, fever, chills, palpitation  82F PMH CAD s/p 3vCABG, HTN, HLD, kidney stones, CKD, p/w 1.5 mo of difficulty with stooling and episode of BRBPR today and lightheadedness.   Pt has been struggling with trace blood with BMs for ~1.5mo & was seen 11/8 in the ED for same.   No prior colonoscopy/endoscopy for years, Family having trouble getting cardiac clearance for scopes outpatient    Denies CP, SOB, abm, N/V, fever, chills, palpitation

## 2022-11-24 NOTE — H&P ADULT - NSHPLABSRESULTS_GEN_ALL_CORE
EKG personally reviewed:    Images Personally reviewed:  < from: Xray Chest 1 View AP/PA (11.23.22 @ 18:25) >  INTERPRETATION:  Clear lungs  < end of copied text > EKG personally reviewed:  NSR 60 bpm, TWI V1-V2    Images Personally reviewed:  < from: Xray Chest 1 View AP/PA (11.23.22 @ 18:25) >  INTERPRETATION:  Clear lungs  < end of copied text >

## 2022-11-24 NOTE — ED ADULT NURSE REASSESSMENT NOTE - COMFORT CARE
pt and family made aware not able to eat/drink at present until GI examines pt as per DR ILIANA Fu/plan of care explained
pt and family made aware of admission and waiting inpt bed assignment/plan of care explained
plan of care explained

## 2022-11-24 NOTE — PATIENT PROFILE ADULT - FALL HARM RISK - HARM RISK INTERVENTIONS
Assistance with ambulation/Assistance OOB with selected safe patient handling equipment/Communicate Risk of Fall with Harm to all staff/Monitor for mental status changes/Monitor gait and stability/Reinforce activity limits and safety measures with patient and family/Review medications for side effects contributing to fall risk/Sit up slowly, dangle for a short time, stand at bedside before walking/Tailored Fall Risk Interventions/Visual Cue: Yellow wristband and red socks/Bed in lowest position, wheels locked, appropriate side rails in place/Call bell, personal items and telephone in reach/Instruct patient to call for assistance before getting out of bed or chair/Non-slip footwear when patient is out of bed/[place] to call system/Physically safe environment - no spills, clutter or unnecessary equipment/Purposeful Proactive Rounding/Room/bathroom lighting operational, light cord in reach

## 2022-11-24 NOTE — PROGRESS NOTE ADULT - SUBJECTIVE AND OBJECTIVE BOX
Andre Reyes, M.D.  Pager: [phone]  Office: [phone]    Patient is a 82y old  Female who presents with a chief complaint of BRBPR (24 Nov 2022 08:58)          SUBJECTIVE / OVERNIGHT EVENTS:    No acute overnight events.    ROS: ( - ) Fever, ( - )Chills,  ( - )Nausea/Vomiting, ( - ) Cough, ( - )Shortness of breath, ( - )Chest Pain    MEDICATIONS  (STANDING):  amLODIPine   Tablet 5 milliGRAM(s) Oral daily  atorvastatin 80 milliGRAM(s) Oral at bedtime  budesonide  80 MICROgram(s)/formoterol 4.5 MICROgram(s) Inhaler 2 Puff(s) Inhalation two times a day  carvedilol 25 milliGRAM(s) Oral every 12 hours  dapagliflozin 5 milliGRAM(s) Oral daily  influenza  Vaccine (HIGH DOSE) 0.7 milliLiter(s) IntraMuscular once  isosorbide   mononitrate ER Tablet (IMDUR) 60 milliGRAM(s) Oral daily  mirtazapine 30 milliGRAM(s) Oral daily  pantoprazole  Injectable 40 milliGRAM(s) IV Push every 12 hours  ranolazine 500 milliGRAM(s) Oral two times a day  spironolactone 25 milliGRAM(s) Oral daily    MEDICATIONS  (PRN):  acetaminophen     Tablet .. 650 milliGRAM(s) Oral every 6 hours PRN Temp greater or equal to 38C (100.4F), Mild Pain (1 - 3)  aluminum hydroxide/magnesium hydroxide/simethicone Suspension 30 milliLiter(s) Oral every 4 hours PRN Dyspepsia  artificial  tears Solution 1 Drop(s) Both EYES every 4 hours PRN dry eye  melatonin 3 milliGRAM(s) Oral at bedtime PRN Insomnia  ondansetron Injectable 4 milliGRAM(s) IV Push every 8 hours PRN Nausea and/or Vomiting          T(C): 36.7 (11-24 @ 05:20), Max: 36.9 (11-23 @ 17:18)   HR: 67   BP: 146/64   RR: 18   SpO2: 98%    PHYSICAL EXAM:    CONSTITUTIONAL: NAD, well-developed, well-groomed  EYES: PERRLA; conjunctiva and sclera clear  ENMT: Moist oral mucosa, no pharyngeal injection or exudates; normal dentition  NECK: Supple, no palpable masses; no thyromegaly  RESPIRATORY: Normal respiratory effort; lungs are clear to auscultation bilaterally  CARDIOVASCULAR: Regular rate and rhythm, normal S1 and S2, no murmur/rub/gallop; No lower extremity edema; Peripheral pulses are 2+ bilaterally  ABDOMEN: Nontender to palpation, normoactive bowel sounds, no rebound/guarding; No hepatosplenomegaly  MUSCULOSKELETAL:  no clubbing or cyanosis of digits; no joint swelling or tenderness to palpation  PSYCH: A+O to person, place, and time; affect appropriate  NEUROLOGY: CN 2-12 are intact and symmetric; no gross sensory deficits   SKIN: No rashes; no palpable lesions      LABS:                        10.5   4.15  )-----------( 161      ( 24 Nov 2022 07:43 )             32.3      11-24    141  |  107  |  18  ----------------------------<  152<H>  3.9   |  22  |  1.80<H>    Ca    9.1      24 Nov 2022 07:43    TPro  6.5  /  Alb  3.8  /  TBili  0.2  /  DBili  x   /  AST  21  /  ALT  9<L>  /  AlkPhos  45  11-24       CAPILLARY BLOOD GLUCOSE      POCT Blood Glucose.: 124 mg/dL (24 Nov 2022 08:45)  POCT Blood Glucose.: 70 mg/dL (24 Nov 2022 00:11)      RADIOLOGY & ADDITIONAL TESTS:    Imaging Personally Reviewed:  Consultant(s) Notes Reviewed:    Care Discussed with Consultants/Other Providers:   Andre Reyes, M.D.  Pager: [phone]  Office: [phone]    Patient is a 82y old  Female who presents with a chief complaint of BRBPR (24 Nov 2022 08:58)          SUBJECTIVE / OVERNIGHT EVENTS:    No acute overnight events.  No bloodly bowel movement this morning    ROS: ( - ) Fever, ( - )Chills,  ( - )Nausea/Vomiting, ( - ) Cough, ( - )Shortness of breath, ( - )Chest Pain    MEDICATIONS  (STANDING):  amLODIPine   Tablet 5 milliGRAM(s) Oral daily  atorvastatin 80 milliGRAM(s) Oral at bedtime  budesonide  80 MICROgram(s)/formoterol 4.5 MICROgram(s) Inhaler 2 Puff(s) Inhalation two times a day  carvedilol 25 milliGRAM(s) Oral every 12 hours  dapagliflozin 5 milliGRAM(s) Oral daily  influenza  Vaccine (HIGH DOSE) 0.7 milliLiter(s) IntraMuscular once  isosorbide   mononitrate ER Tablet (IMDUR) 60 milliGRAM(s) Oral daily  mirtazapine 30 milliGRAM(s) Oral daily  pantoprazole  Injectable 40 milliGRAM(s) IV Push every 12 hours  ranolazine 500 milliGRAM(s) Oral two times a day  spironolactone 25 milliGRAM(s) Oral daily    MEDICATIONS  (PRN):  acetaminophen     Tablet .. 650 milliGRAM(s) Oral every 6 hours PRN Temp greater or equal to 38C (100.4F), Mild Pain (1 - 3)  aluminum hydroxide/magnesium hydroxide/simethicone Suspension 30 milliLiter(s) Oral every 4 hours PRN Dyspepsia  artificial  tears Solution 1 Drop(s) Both EYES every 4 hours PRN dry eye  melatonin 3 milliGRAM(s) Oral at bedtime PRN Insomnia  ondansetron Injectable 4 milliGRAM(s) IV Push every 8 hours PRN Nausea and/or Vomiting          T(C): 36.7 (11-24 @ 05:20), Max: 36.9 (11-23 @ 17:18)   HR: 67   BP: 146/64   RR: 18   SpO2: 98%    PHYSICAL EXAM:    CONSTITUTIONAL: NAD, well-developed, well-groomed  EYES: PERRLA; conjunctiva and sclera clear  ENMT: Moist oral mucosa, no pharyngeal injection or exudates; normal dentition  NECK: Supple, no palpable masses; no thyromegaly  RESPIRATORY: Normal respiratory effort; lungs are clear to auscultation bilaterally  CARDIOVASCULAR: Regular rate and rhythm, normal S1 and S2, no murmur/rub/gallop; No lower extremity edema; Peripheral pulses are 2+ bilaterally  ABDOMEN: mild generalized tenderness to palpation, normoactive bowel sounds, no rebound/guarding; No hepatosplenomegaly  MUSCULOSKELETAL:  no clubbing or cyanosis of digits; no joint swelling or tenderness to palpation  PSYCH: A+O to person, place, and time; affect appropriate  NEUROLOGY: CN 2-12 are intact and symmetric; no gross sensory deficits   SKIN: No rashes; no palpable lesions      LABS:                        10.5   4.15  )-----------( 161      ( 24 Nov 2022 07:43 )             32.3      11-24    141  |  107  |  18  ----------------------------<  152<H>  3.9   |  22  |  1.80<H>    Ca    9.1      24 Nov 2022 07:43    TPro  6.5  /  Alb  3.8  /  TBili  0.2  /  DBili  x   /  AST  21  /  ALT  9<L>  /  AlkPhos  45  11-24       CAPILLARY BLOOD GLUCOSE      POCT Blood Glucose.: 124 mg/dL (24 Nov 2022 08:45)  POCT Blood Glucose.: 70 mg/dL (24 Nov 2022 00:11)      RADIOLOGY & ADDITIONAL TESTS:    Imaging Personally Reviewed:  Consultant(s) Notes Reviewed:    Care Discussed with Consultants/Other Providers:

## 2022-11-24 NOTE — PROGRESS NOTE ADULT - SUBJECTIVE AND OBJECTIVE BOX
Patient is a 82y Female     Patient is a 82y old  Female who presents with a chief complaint of BRBPR (24 Nov 2022 00:18)      HPI:  82F PMH CAD s/p 3vCABG, HTN, HLD, kidney stones, CKD, p/w 1.5 mo of difficulty with stooling and episode of BRBPR today and lightheadedness.   Pt has been struggling with trace blood with BMs for ~1.5mo & was seen 11/8 in the ED for same.   No prior colonoscopy/endoscopy for years, Family having trouble getting cardiac clearance for scopes outpatient    Denies CP, SOB, abm, N/V, fever, chills, palpitation  (24 Nov 2022 00:18)      PAST MEDICAL & SURGICAL HISTORY:  Hypertension      Kidney stones      Dyslipidemia      Depression      MRSA infection  nasal abscess      Coronary artery disease  s/p PCI with one stent to LAD 2009, CABG      Myocardial infarct      Cataracts, bilateral      Prediabetes      S/P coronary artery bypass graft x 3  RCA, OM &amp; LAD bypassed (SVG x 2, LIMA x 1)          MEDICATIONS  (STANDING):  amLODIPine   Tablet 5 milliGRAM(s) Oral daily  atorvastatin 80 milliGRAM(s) Oral at bedtime  budesonide  80 MICROgram(s)/formoterol 4.5 MICROgram(s) Inhaler 2 Puff(s) Inhalation two times a day  carvedilol 25 milliGRAM(s) Oral every 12 hours  dapagliflozin 5 milliGRAM(s) Oral daily  influenza  Vaccine (HIGH DOSE) 0.7 milliLiter(s) IntraMuscular once  isosorbide   mononitrate ER Tablet (IMDUR) 60 milliGRAM(s) Oral daily  mirtazapine 30 milliGRAM(s) Oral daily  pantoprazole  Injectable 40 milliGRAM(s) IV Push every 12 hours  ranolazine 500 milliGRAM(s) Oral two times a day  spironolactone 25 milliGRAM(s) Oral daily      Allergies    aspirin (Other; Swelling)  latex (Pruritus; Rash)    Intolerances        SOCIAL HISTORY:  Denies ETOh,Smoking,     FAMILY HISTORY:      REVIEW OF SYSTEMS:    CONSTITUTIONAL: No weakness, fevers or chills  EYES/ENT: No visual changes;  No vertigo or throat pain   NECK: No pain or stiffness  RESPIRATORY: No cough, wheezing, hemoptysis; No shortness of breath  CARDIOVASCULAR: No chest pain or palpitations  GASTROINTESTINAL: No abdominal or epigastric pain. No nausea, vomiting, or hematemesis; No diarrhea or constipation. No melena or hematochezia.  GENITOURINARY: No dysuria, frequency or hematuria  NEUROLOGICAL: No numbness or weakness  SKIN: No itching, burning, rashes, or lesions   All other review of systems is negative unless indicated above.    VITAL:  T(C): , Max: 36.9 (11-23-22 @ 17:18)  T(F): , Max: 98.4 (11-23-22 @ 17:18)  HR: 67 (11-24-22 @ 05:20)  BP: 146/64 (11-24-22 @ 05:20)  BP(mean): --  RR: 18 (11-24-22 @ 05:20)  SpO2: 98% (11-24-22 @ 05:20)  Wt(kg): --    I and O's:    Height (cm): 152.4 (11-23 @ 15:40)  Weight (kg): 49.9 (11-23 @ 15:40)  BMI (kg/m2): 21.5 (11-23 @ 15:40)  BSA (m2): 1.45 (11-23 @ 15:40)    PHYSICAL EXAM:    Constitutional: NAD  HEENT: PERRLA,   Neck: No JVD  Respiratory: CTA B/L  Cardiovascular: S1 and S2  Gastrointestinal: BS+, soft, NT/ND  Extremities: No peripheral edema  Neurological: A/O x 3, no focal deficits  Psychiatric: Normal mood, normal affect  : No Vines  Skin: No rashes  Access: Not applicable  Back: No CVA tenderness    LABS:                        10.5   4.15  )-----------( 161      ( 24 Nov 2022 07:43 )             32.3     11-24    141  |  107  |  18  ----------------------------<  152<H>  3.9   |  22  |  1.80<H>    Ca    9.1      24 Nov 2022 07:43    TPro  6.5  /  Alb  3.8  /  TBili  0.2  /  DBili  x   /  AST  21  /  ALT  9<L>  /  AlkPhos  45  11-24          RADIOLOGY & ADDITIONAL STUDIES:

## 2022-11-24 NOTE — H&P ADULT - PROBLEM SELECTOR PLAN 3
- Pt w/ presumed HFrEF as on Carvedilol, Ranolazine, Farxiga, Pioglitazone, spironolactone  - Reviewed last avalb Echo from Oct 2019, norm LVSF, EF 63%  - repeat echo (ordered)  - Cardiac consult (to be called in AM) in this medically complex pt   for cardiac clearance prior to scope for GIB - Pt w/ presumed HFrEF as on Carvedilol, Ranolazine, Farxiga, Pioglitazone, spironolactone  - Reviewed last avalb Echo from Oct 2019, norm LVSF, EF 63%  - repeat echo (ordered)  - Cardiac consult (to be called in AM) in this medically complex pt   for cardiac clearance prior to scope for GIB    O/p Cardio Miguel Macdonald ([phone])

## 2022-11-24 NOTE — H&P ADULT - NSHPPHYSICALEXAM_GEN_ALL_CORE
Vital Signs Last 24 Hrs  T(C): 36.5 (23 Nov 2022 23:43), Max: 36.9 (23 Nov 2022 17:18)  T(F): 97.7 (23 Nov 2022 23:43), Max: 98.4 (23 Nov 2022 17:18)  HR: 57 (23 Nov 2022 23:43) (57 - 59)  BP: 127/71 (23 Nov 2022 23:43) (127/71 - 156/79)  BP(mean): --  RR: 17 (23 Nov 2022 23:43) (17 - 20)  SpO2: 99% (23 Nov 2022 23:43) (98% - 99%)    Parameters below as of 23 Nov 2022 23:43  Patient On (Oxygen Delivery Method): room air

## 2022-11-24 NOTE — H&P ADULT - ASSESSMENT
82F PMH CAD s/p 3vCABG, HTN, HLD, kidney stones, CKD, p/w 1.5 mo of difficulty stooling and episode of BRBPR today admitted for management of GIB

## 2022-11-24 NOTE — H&P ADULT - PROBLEM SELECTOR PLAN 1
p/w BRBPR, reports lightheadedness   - Stool occult (+)  - VSS   - Hg 11.9  - Monitor H&H, Transfuse to keep Hg >8 ( hx/o cardiac dz)  - On Plavix will hold  - Gentle hydration w/500ml NS bolus iso cardiac hx  - IV protonix 40mg q12  - No abm pain, fever, leukocytosis, abx not indicated   - GI consult for likely scope (emailed)

## 2022-11-25 ENCOUNTER — TRANSCRIPTION ENCOUNTER (OUTPATIENT)
Age: 82
End: 2022-11-25

## 2022-11-25 LAB
ANION GAP SERPL CALC-SCNC: 11 MMOL/L — SIGNIFICANT CHANGE UP (ref 5–17)
BUN SERPL-MCNC: 17 MG/DL — SIGNIFICANT CHANGE UP (ref 7–23)
CALCIUM SERPL-MCNC: 9.6 MG/DL — SIGNIFICANT CHANGE UP (ref 8.4–10.5)
CHLORIDE SERPL-SCNC: 107 MMOL/L — SIGNIFICANT CHANGE UP (ref 96–108)
CO2 SERPL-SCNC: 22 MMOL/L — SIGNIFICANT CHANGE UP (ref 22–31)
CREAT SERPL-MCNC: 1.88 MG/DL — HIGH (ref 0.5–1.3)
EGFR: 27 ML/MIN/1.73M2 — LOW
GLUCOSE SERPL-MCNC: 76 MG/DL — SIGNIFICANT CHANGE UP (ref 70–99)
HCT VFR BLD CALC: 31.6 % — LOW (ref 34.5–45)
HGB BLD-MCNC: 10.9 G/DL — LOW (ref 11.5–15.5)
MCHC RBC-ENTMCNC: 32.4 PG — SIGNIFICANT CHANGE UP (ref 27–34)
MCHC RBC-ENTMCNC: 34.5 GM/DL — SIGNIFICANT CHANGE UP (ref 32–36)
MCV RBC AUTO: 94 FL — SIGNIFICANT CHANGE UP (ref 80–100)
NRBC # BLD: 0 /100 WBCS — SIGNIFICANT CHANGE UP (ref 0–0)
PLATELET # BLD AUTO: 167 K/UL — SIGNIFICANT CHANGE UP (ref 150–400)
POTASSIUM SERPL-MCNC: 4.1 MMOL/L — SIGNIFICANT CHANGE UP (ref 3.5–5.3)
POTASSIUM SERPL-SCNC: 4.1 MMOL/L — SIGNIFICANT CHANGE UP (ref 3.5–5.3)
RBC # BLD: 3.36 M/UL — LOW (ref 3.8–5.2)
RBC # FLD: 14.7 % — HIGH (ref 10.3–14.5)
SODIUM SERPL-SCNC: 140 MMOL/L — SIGNIFICANT CHANGE UP (ref 135–145)
WBC # BLD: 3.37 K/UL — LOW (ref 3.8–10.5)
WBC # FLD AUTO: 3.37 K/UL — LOW (ref 3.8–10.5)

## 2022-11-25 PROCEDURE — 93356 MYOCRD STRAIN IMG SPCKL TRCK: CPT

## 2022-11-25 PROCEDURE — 93306 TTE W/DOPPLER COMPLETE: CPT | Mod: 26

## 2022-11-25 PROCEDURE — 99232 SBSQ HOSP IP/OBS MODERATE 35: CPT

## 2022-11-25 RX ORDER — MIRTAZAPINE 45 MG/1
30 TABLET, ORALLY DISINTEGRATING ORAL AT BEDTIME
Refills: 0 | Status: DISCONTINUED | OUTPATIENT
Start: 2022-11-26 | End: 2022-11-29

## 2022-11-25 RX ORDER — BACITRACIN ZINC 500 UNIT/G
1 OINTMENT IN PACKET (EA) TOPICAL
Refills: 0 | Status: DISCONTINUED | OUTPATIENT
Start: 2022-11-25 | End: 2022-11-29

## 2022-11-25 RX ADMIN — CARVEDILOL PHOSPHATE 25 MILLIGRAM(S): 80 CAPSULE, EXTENDED RELEASE ORAL at 10:18

## 2022-11-25 RX ADMIN — ATORVASTATIN CALCIUM 80 MILLIGRAM(S): 80 TABLET, FILM COATED ORAL at 22:00

## 2022-11-25 RX ADMIN — ISOSORBIDE MONONITRATE 60 MILLIGRAM(S): 60 TABLET, EXTENDED RELEASE ORAL at 11:18

## 2022-11-25 RX ADMIN — SPIRONOLACTONE 25 MILLIGRAM(S): 25 TABLET, FILM COATED ORAL at 05:55

## 2022-11-25 RX ADMIN — Medication 1 MILLIGRAM(S): at 11:26

## 2022-11-25 RX ADMIN — Medication 3 MILLIGRAM(S): at 22:00

## 2022-11-25 RX ADMIN — DAPAGLIFLOZIN 5 MILLIGRAM(S): 10 TABLET, FILM COATED ORAL at 11:17

## 2022-11-25 RX ADMIN — RANOLAZINE 500 MILLIGRAM(S): 500 TABLET, FILM COATED, EXTENDED RELEASE ORAL at 05:55

## 2022-11-25 RX ADMIN — AMLODIPINE BESYLATE 5 MILLIGRAM(S): 2.5 TABLET ORAL at 05:55

## 2022-11-25 RX ADMIN — ONDANSETRON 4 MILLIGRAM(S): 8 TABLET, FILM COATED ORAL at 10:20

## 2022-11-25 RX ADMIN — Medication 1 APPLICATION(S): at 10:21

## 2022-11-25 RX ADMIN — PANTOPRAZOLE SODIUM 40 MILLIGRAM(S): 20 TABLET, DELAYED RELEASE ORAL at 05:54

## 2022-11-25 RX ADMIN — MIRTAZAPINE 30 MILLIGRAM(S): 45 TABLET, ORALLY DISINTEGRATING ORAL at 11:18

## 2022-11-25 RX ADMIN — CARVEDILOL PHOSPHATE 25 MILLIGRAM(S): 80 CAPSULE, EXTENDED RELEASE ORAL at 17:40

## 2022-11-25 RX ADMIN — PANTOPRAZOLE SODIUM 40 MILLIGRAM(S): 20 TABLET, DELAYED RELEASE ORAL at 17:42

## 2022-11-25 RX ADMIN — RANOLAZINE 500 MILLIGRAM(S): 500 TABLET, FILM COATED, EXTENDED RELEASE ORAL at 17:40

## 2022-11-25 NOTE — PROGRESS NOTE ADULT - SUBJECTIVE AND OBJECTIVE BOX
DATE OF SERVICE: 11-25-22 @ 10:39    Patient is a 82y old  Female who presents with a chief complaint of BRBPR (25 Nov 2022 10:30)      INTERVAL HISTORY: Feels ok. Still with decreased appetite and abdominal discomfort.     REVIEW OF SYSTEMS:  CONSTITUTIONAL: No weakness  EYES/ENT: No visual changes;  No throat pain   NECK: No pain or stiffness  RESPIRATORY: No cough, wheezing; No shortness of breath  CARDIOVASCULAR: No chest pain or palpitations  GASTROINTESTINAL: + abdominal  pain. No nausea, vomiting, or hematemesis  GENITOURINARY: No dysuria, frequency or hematuria  NEUROLOGICAL: No stroke like symptoms  SKIN: No rashes    TELEMETRY Personally reviewed: SB/SR 50-60  	  MEDICATIONS:  amLODIPine   Tablet 5 milliGRAM(s) Oral daily  carvedilol 25 milliGRAM(s) Oral every 12 hours  isosorbide   mononitrate ER Tablet (IMDUR) 60 milliGRAM(s) Oral daily  ranolazine 500 milliGRAM(s) Oral two times a day  spironolactone 25 milliGRAM(s) Oral daily        PHYSICAL EXAM:  T(C): 36.8 (11-25-22 @ 10:00), Max: 36.8 (11-24-22 @ 21:11)  HR: 60 (11-25-22 @ 10:00) (56 - 61)  BP: 118/64 (11-25-22 @ 10:00) (102/60 - 118/64)  RR: 18 (11-25-22 @ 10:00) (18 - 19)  SpO2: 97% (11-25-22 @ 10:00) (96% - 97%)  Wt(kg): --  I&O's Summary    24 Nov 2022 07:01  -  25 Nov 2022 07:00  --------------------------------------------------------  IN: 520 mL / OUT: 0 mL / NET: 520 mL          Appearance: In no distress	  HEENT:    PERRL, EOMI	  Cardiovascular:  S1 S2, No JVD  Respiratory: Lungs clear to auscultation	  Gastrointestinal:  Soft, Non-tender, + BS	  Vascularature:  No edema of LE  Psychiatric: Appropriate affect   Neuro: no acute focal deficits                               10.9   3.37  )-----------( 167      ( 25 Nov 2022 06:24 )             31.6     11-25    140  |  107  |  17  ----------------------------<  76  4.1   |  22  |  1.88<H>    Ca    9.6      25 Nov 2022 06:24    TPro  6.5  /  Alb  3.8  /  TBili  0.2  /  DBili  x   /  AST  21  /  ALT  9<L>  /  AlkPhos  45  11-24        Labs personally reviewed      ASSESSMENT/PLAN: 	    82F PMH CAD s/p 3vCABG, HTN, HLD, kidney stones, CKD, p/w 1.5 mo of difficulty with stooling and episode of BRBPR today and lightheadedness.   Pt has been struggling with trace blood with BMs for ~1.5mo & was seen 11/8 in the ED for same. No prior colonoscopy/endoscopy for years, Family having trouble getting cardiac clearance for scopes outpatient. Followed by outpatient Cardiologist, Dr. Miguel Macdonald.     Problem/Plan -1  Problem: Heart Failure with preserved Ejection Fraction  - Prior TTE from 2019 with EF 63%, mild MR, + WMA, moderate diastolic dysfunction, normal RV size and function  - proBNP 11/7 529  - Patient appears to be on sHF GDMT. Will repeat TTE to assess for LVEF, WMA and valvular dysfunction  - For now will continue with sHF GDMT: Hydralazine 25 mg PO BID, coreg 25mg PO BID, Farxiga 5mg PO daily, spironolactone 25mg PO daily    Problem/Plan -2  Problem:  Coronary Artery Disease  - ECG SR unchanged from previous ECG  - Hx of CAD with hx of remote CABG (in 1990s) and subsequent PCI at [facility] [place] but could not recall years  - per OP cardio Dr Macdonald, cath anout 4 years ago with diffuse disease not amendable to PCI  - Reports average functional capacity. CHronic stable angina  - c/w coreg 25mg PO BID, atorvastatin 80mg PO daily (in place of rosuvastatin), imdur 60mg PO daily, ranexa 500mg PO BID  - Will repeat TTE to assess for WMA    Problem/Plan -3  Problem: HTN  - BP currently wnl.    Problem/Plan -4  Problem: HLD  - c/w statin  - Will resume Vascepa upon DC    Problem/Plan -5  Problem: Cardiac Risk Stratification  - Patient does not appear to be in decompensated HF  - No hx of tachy bridget arrhythmias  - No hx of severe AS/MS   - Patient is mod risk for mod risk colonoscopy. No contraindication to proceed pending review of TTE.      Dr Younger discussed with OP cardio Dr Miguel Macdonald at [facility]-, pt with known chronic stable angina, managed medically as diffuse disease not amenable to PCI        Amanda Brooks, AG-NP   Alfredito Younger,  [facility]  Cardiovascular Medicine  [address] [place] [address], [address]  Available through call or text on [employer]  Office: [phone]   DATE OF SERVICE: 11-25-22 @ 10:39    Patient is a 82y old  Female who presents with a chief complaint of BRBPR (25 Nov 2022 10:30)      INTERVAL HISTORY: Feels ok. Still with decreased appetite and abdominal discomfort.     REVIEW OF SYSTEMS:  CONSTITUTIONAL: No weakness  EYES/ENT: No visual changes;  No throat pain   NECK: No pain or stiffness  RESPIRATORY: No cough, wheezing; No shortness of breath  CARDIOVASCULAR: No chest pain or palpitations  GASTROINTESTINAL: + abdominal  pain. No nausea, vomiting, or hematemesis  GENITOURINARY: No dysuria, frequency or hematuria  NEUROLOGICAL: No stroke like symptoms  SKIN: No rashes    TELEMETRY Personally reviewed: SB/SR 50-60  	  MEDICATIONS:  amLODIPine   Tablet 5 milliGRAM(s) Oral daily  carvedilol 25 milliGRAM(s) Oral every 12 hours  isosorbide   mononitrate ER Tablet (IMDUR) 60 milliGRAM(s) Oral daily  ranolazine 500 milliGRAM(s) Oral two times a day  spironolactone 25 milliGRAM(s) Oral daily        PHYSICAL EXAM:  T(C): 36.8 (11-25-22 @ 10:00), Max: 36.8 (11-24-22 @ 21:11)  HR: 60 (11-25-22 @ 10:00) (56 - 61)  BP: 118/64 (11-25-22 @ 10:00) (102/60 - 118/64)  RR: 18 (11-25-22 @ 10:00) (18 - 19)  SpO2: 97% (11-25-22 @ 10:00) (96% - 97%)  Wt(kg): --  I&O's Summary    24 Nov 2022 07:01  -  25 Nov 2022 07:00  --------------------------------------------------------  IN: 520 mL / OUT: 0 mL / NET: 520 mL          Appearance: In no distress	  HEENT:    PERRL, EOMI	  Cardiovascular:  S1 S2, No JVD  Respiratory: Lungs clear to auscultation	  Gastrointestinal:  Soft, Non-tender, + BS	  Vascularature:  No edema of LE  Psychiatric: Appropriate affect   Neuro: no acute focal deficits                               10.9   3.37  )-----------( 167      ( 25 Nov 2022 06:24 )             31.6     11-25    140  |  107  |  17  ----------------------------<  76  4.1   |  22  |  1.88<H>    Ca    9.6      25 Nov 2022 06:24    TPro  6.5  /  Alb  3.8  /  TBili  0.2  /  DBili  x   /  AST  21  /  ALT  9<L>  /  AlkPhos  45  11-24        Labs personally reviewed      ASSESSMENT/PLAN: 	    82F PMH CAD s/p 3vCABG, HTN, HLD, kidney stones, CKD, p/w 1.5 mo of difficulty with stooling and episode of BRBPR today and lightheadedness.   Pt has been struggling with trace blood with BMs for ~1.5mo & was seen 11/8 in the ED for same. No prior colonoscopy/endoscopy for years, Family having trouble getting cardiac clearance for scopes outpatient. Followed by outpatient Cardiologist, Dr. Miguel Macdonald.     Problem/Plan -1  Problem: Heart Failure with preserved Ejection Fraction  - Prior TTE from 2019 with EF 63%, mild MR, + WMA, moderate diastolic dysfunction, normal RV size and function  - proBNP 11/7 529  - Patient appears to be on sHF GDMT. Will repeat TTE to assess for LVEF, WMA and valvular dysfunction  - For now will continue with sHF GDMT: Hydralazine 25 mg PO BID, coreg 25mg PO BID, Farxiga 5mg PO daily, spironolactone 25mg PO daily    Problem/Plan -2  Problem:  Coronary Artery Disease  - ECG SR unchanged from previous ECG  - Hx of CAD with hx of remote CABG (in 1990s) and subsequent [place] [facility] but could not recall years  - per OP cardio Dr Macdonald, cath anout 4 years ago with diffuse disease not amendable to PCI  - Reports average functional capacity. CHronic stable angina  - c/w coreg 25mg PO BID, atorvastatin 80mg PO daily (in place of rosuvastatin), imdur 60mg PO daily, ranexa 500mg PO BID  - Will repeat TTE to assess for WMA    Problem/Plan -3  Problem: HTN  - BP currently wnl.    Problem/Plan -4  Problem: HLD  - c/w statin  - Will resume Vascepa upon DC    Problem/Plan -5  Problem: Cardiac Risk Stratification  - Patient does not appear to be in decompensated HF  - No hx of tachy bridget arrhythmias  - No hx of severe AS/MS   - Patient is mod risk for mod risk colonoscopy. No contraindication to proceed pending review of TTE.      Dr Younger discussed with OP cardio Dr Miguel Macdonald at [facility], pt with known chronic stable angina, managed medically as diffuse disease not amenable to PCI. Dr Macdonald agreed with no cardiac contraindication for EGD/colon        Amanda Brooks, AG-NP   Alfredito Younger,  [facility]  Cardiovascular Medicine  [address] [place] [address], [address]  Available through call or text on [employer]  Office: [phone]

## 2022-11-25 NOTE — DISCHARGE NOTE NURSING/CASE MANAGEMENT/SOCIAL WORK - PATIENT PORTAL LINK FT
You can access the FollowMyHealth Patient Portal offered by [facility] by registering at the following website: [email or web address]. By joining [employer]’s FollowMyHealth portal, you will also be able to view your health information using other applications (apps) compatible with our system.

## 2022-11-25 NOTE — PROGRESS NOTE ADULT - SUBJECTIVE AND OBJECTIVE BOX
Patient is a 82y Female     Patient is a 82y old  Female who presents with a chief complaint of BRBPR (25 Nov 2022 10:28)      HPI:  82F PMH CAD s/p 3vCABG, HTN, HLD, kidney stones, CKD, p/w 1.5 mo of difficulty with stooling and episode of BRBPR today and lightheadedness.   Pt has been struggling with trace blood with BMs for ~1.5mo & was seen 11/8 in the ED for same.   No prior colonoscopy/endoscopy for years, Family having trouble getting cardiac clearance for scopes outpatient    Denies CP, SOB, abm, N/V, fever, chills, palpitation  (24 Nov 2022 00:18)      PAST MEDICAL & SURGICAL HISTORY:  Hypertension      Kidney stones      Dyslipidemia      Depression      MRSA infection  nasal abscess      Coronary artery disease  s/p PCI with one stent to LAD 2009, CABG      Myocardial infarct      Cataracts, bilateral      Prediabetes      S/P coronary artery bypass graft x 3  RCA, OM &amp; LAD bypassed (SVG x 2, LIMA x 1)          MEDICATIONS  (STANDING):  amLODIPine   Tablet 5 milliGRAM(s) Oral daily  atorvastatin 80 milliGRAM(s) Oral at bedtime  bacitracin   Ointment 1 Application(s) Topical two times a day  budesonide  80 MICROgram(s)/formoterol 4.5 MICROgram(s) Inhaler 2 Puff(s) Inhalation two times a day  carvedilol 25 milliGRAM(s) Oral every 12 hours  dapagliflozin 5 milliGRAM(s) Oral daily  influenza  Vaccine (HIGH DOSE) 0.7 milliLiter(s) IntraMuscular once  isosorbide   mononitrate ER Tablet (IMDUR) 60 milliGRAM(s) Oral daily  mirtazapine 30 milliGRAM(s) Oral daily  pantoprazole  Injectable 40 milliGRAM(s) IV Push every 12 hours  ranolazine 500 milliGRAM(s) Oral two times a day  spironolactone 25 milliGRAM(s) Oral daily      Allergies    aspirin (Other; Swelling)  latex (Pruritus; Rash)    Intolerances        SOCIAL HISTORY:  Denies ETOh,Smoking,     FAMILY HISTORY:      REVIEW OF SYSTEMS:    CONSTITUTIONAL: No weakness, fevers or chills  EYES/ENT: No visual changes;  No vertigo or throat pain   NECK: No pain or stiffness  RESPIRATORY: No cough, wheezing, hemoptysis; No shortness of breath  CARDIOVASCULAR: No chest pain or palpitations  GASTROINTESTINAL: No abdominal or epigastric pain. No nausea, vomiting, or hematemesis; No diarrhea or constipation. No melena or hematochezia.  GENITOURINARY: No dysuria, frequency or hematuria  NEUROLOGICAL: No numbness or weakness  SKIN: No itching, burning, rashes, or lesions   All other review of systems is negative unless indicated above.    VITAL:  T(C): , Max: 36.8 (11-24-22 @ 21:11)  T(F): , Max: 98.2 (11-24-22 @ 21:11)  HR: 60 (11-25-22 @ 10:00)  BP: 118/64 (11-25-22 @ 10:00)  BP(mean): --  RR: 18 (11-25-22 @ 10:00)  SpO2: 97% (11-25-22 @ 10:00)  Wt(kg): --    I and O's:    11-24 @ 07:01  -  11-25 @ 07:00  --------------------------------------------------------  IN: 520 mL / OUT: 0 mL / NET: 520 mL          PHYSICAL EXAM:    Constitutional: NAD  HEENT: PERRLA,   Neck: No JVD  Respiratory: CTA B/L  Cardiovascular: S1 and S2  Gastrointestinal: BS+, soft, NT/ND  Extremities: No peripheral edema  Neurological: A/O x 3, no focal deficits  Psychiatric: Normal mood, normal affect  : No Vines  Skin: No rashes  Access: Not applicable  Back: No CVA tenderness    LABS:                        10.9   3.37  )-----------( 167      ( 25 Nov 2022 06:24 )             31.6     11-25    140  |  107  |  17  ----------------------------<  76  4.1   |  22  |  1.88<H>    Ca    9.6      25 Nov 2022 06:24    TPro  6.5  /  Alb  3.8  /  TBili  0.2  /  DBili  x   /  AST  21  /  ALT  9<L>  /  AlkPhos  45  11-24          RADIOLOGY & ADDITIONAL STUDIES:

## 2022-11-25 NOTE — PROGRESS NOTE ADULT - SUBJECTIVE AND OBJECTIVE BOX
Andre Reyes, M.D.  Pager: [phone]  Office: [phone]    Patient is a 82y old  Female who presents with a chief complaint of BRBPR (24 Nov 2022 11:09)          SUBJECTIVE / OVERNIGHT EVENTS:    No acute overnight events.    ROS: ( - ) Fever, ( - )Chills,  ( - )Nausea/Vomiting, ( - ) Cough, ( - )Shortness of breath, ( - )Chest Pain    MEDICATIONS  (STANDING):  amLODIPine   Tablet 5 milliGRAM(s) Oral daily  atorvastatin 80 milliGRAM(s) Oral at bedtime  bacitracin   Ointment 1 Application(s) Topical two times a day  budesonide  80 MICROgram(s)/formoterol 4.5 MICROgram(s) Inhaler 2 Puff(s) Inhalation two times a day  carvedilol 25 milliGRAM(s) Oral every 12 hours  dapagliflozin 5 milliGRAM(s) Oral daily  influenza  Vaccine (HIGH DOSE) 0.7 milliLiter(s) IntraMuscular once  isosorbide   mononitrate ER Tablet (IMDUR) 60 milliGRAM(s) Oral daily  mirtazapine 30 milliGRAM(s) Oral daily  pantoprazole  Injectable 40 milliGRAM(s) IV Push every 12 hours  ranolazine 500 milliGRAM(s) Oral two times a day  spironolactone 25 milliGRAM(s) Oral daily    MEDICATIONS  (PRN):  acetaminophen     Tablet .. 650 milliGRAM(s) Oral every 6 hours PRN Temp greater or equal to 38C (100.4F), Mild Pain (1 - 3)  aluminum hydroxide/magnesium hydroxide/simethicone Suspension 30 milliLiter(s) Oral every 4 hours PRN Dyspepsia  artificial  tears Solution 1 Drop(s) Both EYES every 4 hours PRN dry eye  melatonin 3 milliGRAM(s) Oral at bedtime PRN Insomnia  ondansetron Injectable 4 milliGRAM(s) IV Push every 8 hours PRN Nausea and/or Vomiting          T(C): 36.8 (11-25 @ 10:00), Max: 36.8 (11-24 @ 21:11)   HR: 60   BP: 118/64   RR: 18   SpO2: 97%    PHYSICAL EXAM:    CONSTITUTIONAL: NAD, well-developed, well-groomed  EYES: PERRLA; conjunctiva and sclera clear  ENMT: Moist oral mucosa, no pharyngeal injection or exudates; normal dentition  NECK: Supple, no palpable masses; no thyromegaly  RESPIRATORY: Normal respiratory effort; lungs are clear to auscultation bilaterally  CARDIOVASCULAR: Regular rate and rhythm, normal S1 and S2, no murmur/rub/gallop; No lower extremity edema; Peripheral pulses are 2+ bilaterally  ABDOMEN: Nontender to palpation, normoactive bowel sounds, no rebound/guarding; No hepatosplenomegaly  MUSCULOSKELETAL:  no clubbing or cyanosis of digits; no joint swelling or tenderness to palpation  PSYCH: A+O to person, place, and time; affect appropriate  NEUROLOGY: CN 2-12 are intact and symmetric; no gross sensory deficits   SKIN: No rashes; no palpable lesions      LABS:                        10.9   3.37  )-----------( 167      ( 25 Nov 2022 06:24 )             31.6      11-25    140  |  107  |  17  ----------------------------<  76  4.1   |  22  |  1.88<H>    Ca    9.6      25 Nov 2022 06:24    TPro  6.5  /  Alb  3.8  /  TBili  0.2  /  DBili  x   /  AST  21  /  ALT  9<L>  /  AlkPhos  45  11-24       CAPILLARY BLOOD GLUCOSE      POCT Blood Glucose.: 108 mg/dL (24 Nov 2022 12:57)      RADIOLOGY & ADDITIONAL TESTS:    Imaging Personally Reviewed:  Consultant(s) Notes Reviewed:    Care Discussed with Consultants/Other Providers:   Andre Reyes, M.D.  Pager: [phone]  Office: [phone]    Patient is a 82y old  Female who presents with a chief complaint of BRBPR (24 Nov 2022 11:09)          SUBJECTIVE / OVERNIGHT EVENTS:    No acute overnight events.  denies having any further bloody bowel movements.    ROS: ( - ) Fever, ( - )Chills,  ( - )Nausea/Vomiting, ( - ) Cough, ( - )Shortness of breath, ( - )Chest Pain    MEDICATIONS  (STANDING):  amLODIPine   Tablet 5 milliGRAM(s) Oral daily  atorvastatin 80 milliGRAM(s) Oral at bedtime  bacitracin   Ointment 1 Application(s) Topical two times a day  budesonide  80 MICROgram(s)/formoterol 4.5 MICROgram(s) Inhaler 2 Puff(s) Inhalation two times a day  carvedilol 25 milliGRAM(s) Oral every 12 hours  dapagliflozin 5 milliGRAM(s) Oral daily  influenza  Vaccine (HIGH DOSE) 0.7 milliLiter(s) IntraMuscular once  isosorbide   mononitrate ER Tablet (IMDUR) 60 milliGRAM(s) Oral daily  mirtazapine 30 milliGRAM(s) Oral daily  pantoprazole  Injectable 40 milliGRAM(s) IV Push every 12 hours  ranolazine 500 milliGRAM(s) Oral two times a day  spironolactone 25 milliGRAM(s) Oral daily    MEDICATIONS  (PRN):  acetaminophen     Tablet .. 650 milliGRAM(s) Oral every 6 hours PRN Temp greater or equal to 38C (100.4F), Mild Pain (1 - 3)  aluminum hydroxide/magnesium hydroxide/simethicone Suspension 30 milliLiter(s) Oral every 4 hours PRN Dyspepsia  artificial  tears Solution 1 Drop(s) Both EYES every 4 hours PRN dry eye  melatonin 3 milliGRAM(s) Oral at bedtime PRN Insomnia  ondansetron Injectable 4 milliGRAM(s) IV Push every 8 hours PRN Nausea and/or Vomiting          T(C): 36.8 (11-25 @ 10:00), Max: 36.8 (11-24 @ 21:11)   HR: 60   BP: 118/64   RR: 18   SpO2: 97%    PHYSICAL EXAM:    CONSTITUTIONAL: NAD, well-developed, well-groomed  EYES: PERRLA; conjunctiva and sclera clear  ENMT: Moist oral mucosa, no pharyngeal injection or exudates; normal dentition  NECK: Supple, no palpable masses; no thyromegaly  RESPIRATORY: Normal respiratory effort; lungs are clear to auscultation bilaterally  CARDIOVASCULAR: Regular rate and rhythm, normal S1 and S2, no murmur/rub/gallop; No lower extremity edema; Peripheral pulses are 2+ bilaterally  ABDOMEN: minimal tenderness to palpation, normoactive bowel sounds, no rebound/guarding; No hepatosplenomegaly  MUSCULOSKELETAL:  no clubbing or cyanosis of digits; no joint swelling or tenderness to palpation  PSYCH: A+O to person, place, and time; affect appropriate  NEUROLOGY: CN 2-12 are intact and symmetric; no gross sensory deficits   SKIN: No rashes; no palpable lesions      LABS:                        10.9   3.37  )-----------( 167      ( 25 Nov 2022 06:24 )             31.6      11-25    140  |  107  |  17  ----------------------------<  76  4.1   |  22  |  1.88<H>    Ca    9.6      25 Nov 2022 06:24    TPro  6.5  /  Alb  3.8  /  TBili  0.2  /  DBili  x   /  AST  21  /  ALT  9<L>  /  AlkPhos  45  11-24       CAPILLARY BLOOD GLUCOSE      POCT Blood Glucose.: 108 mg/dL (24 Nov 2022 12:57)      RADIOLOGY & ADDITIONAL TESTS:    Imaging Personally Reviewed:  Consultant(s) Notes Reviewed:    Care Discussed with Consultants/Other Providers:

## 2022-11-26 ENCOUNTER — TRANSCRIPTION ENCOUNTER (OUTPATIENT)
Age: 82
End: 2022-11-26

## 2022-11-26 LAB
HCT VFR BLD CALC: 33.5 % — LOW (ref 34.5–45)
HGB BLD-MCNC: 11.3 G/DL — LOW (ref 11.5–15.5)
MCHC RBC-ENTMCNC: 31.8 PG — SIGNIFICANT CHANGE UP (ref 27–34)
MCHC RBC-ENTMCNC: 33.7 GM/DL — SIGNIFICANT CHANGE UP (ref 32–36)
MCV RBC AUTO: 94.4 FL — SIGNIFICANT CHANGE UP (ref 80–100)
NRBC # BLD: 0 /100 WBCS — SIGNIFICANT CHANGE UP (ref 0–0)
PLATELET # BLD AUTO: 181 K/UL — SIGNIFICANT CHANGE UP (ref 150–400)
RBC # BLD: 3.55 M/UL — LOW (ref 3.8–5.2)
RBC # FLD: 14.8 % — HIGH (ref 10.3–14.5)
WBC # BLD: 4.02 K/UL — SIGNIFICANT CHANGE UP (ref 3.8–10.5)
WBC # FLD AUTO: 4.02 K/UL — SIGNIFICANT CHANGE UP (ref 3.8–10.5)

## 2022-11-26 PROCEDURE — 99232 SBSQ HOSP IP/OBS MODERATE 35: CPT

## 2022-11-26 RX ORDER — CLOPIDOGREL BISULFATE 75 MG/1
75 TABLET, FILM COATED ORAL DAILY
Refills: 0 | Status: DISCONTINUED | OUTPATIENT
Start: 2022-11-26 | End: 2022-11-29

## 2022-11-26 RX ADMIN — DAPAGLIFLOZIN 5 MILLIGRAM(S): 10 TABLET, FILM COATED ORAL at 12:34

## 2022-11-26 RX ADMIN — AMLODIPINE BESYLATE 5 MILLIGRAM(S): 2.5 TABLET ORAL at 05:36

## 2022-11-26 RX ADMIN — RANOLAZINE 500 MILLIGRAM(S): 500 TABLET, FILM COATED, EXTENDED RELEASE ORAL at 05:35

## 2022-11-26 RX ADMIN — PANTOPRAZOLE SODIUM 40 MILLIGRAM(S): 20 TABLET, DELAYED RELEASE ORAL at 17:34

## 2022-11-26 RX ADMIN — Medication 1 APPLICATION(S): at 17:34

## 2022-11-26 RX ADMIN — MIRTAZAPINE 30 MILLIGRAM(S): 45 TABLET, ORALLY DISINTEGRATING ORAL at 21:48

## 2022-11-26 RX ADMIN — RANOLAZINE 500 MILLIGRAM(S): 500 TABLET, FILM COATED, EXTENDED RELEASE ORAL at 17:34

## 2022-11-26 RX ADMIN — ISOSORBIDE MONONITRATE 60 MILLIGRAM(S): 60 TABLET, EXTENDED RELEASE ORAL at 12:35

## 2022-11-26 RX ADMIN — Medication 1 APPLICATION(S): at 05:37

## 2022-11-26 RX ADMIN — PANTOPRAZOLE SODIUM 40 MILLIGRAM(S): 20 TABLET, DELAYED RELEASE ORAL at 05:37

## 2022-11-26 RX ADMIN — SPIRONOLACTONE 25 MILLIGRAM(S): 25 TABLET, FILM COATED ORAL at 05:35

## 2022-11-26 RX ADMIN — ATORVASTATIN CALCIUM 80 MILLIGRAM(S): 80 TABLET, FILM COATED ORAL at 21:48

## 2022-11-26 RX ADMIN — CARVEDILOL PHOSPHATE 25 MILLIGRAM(S): 80 CAPSULE, EXTENDED RELEASE ORAL at 05:36

## 2022-11-26 RX ADMIN — CLOPIDOGREL BISULFATE 75 MILLIGRAM(S): 75 TABLET, FILM COATED ORAL at 12:36

## 2022-11-26 NOTE — DISCHARGE NOTE PROVIDER - ATTENDING DISCHARGE PHYSICAL EXAMINATION:
T(C): 36.6 (11-27 @ 03:48), Max: 36.9 (11-26 @ 20:53)   HR: 56   BP: 103/58   RR: 18   SpO2: 97%    PHYSICAL EXAM:    CONSTITUTIONAL: NAD, well-developed, well-groomed  EYES: PERRLA; conjunctiva and sclera clear  ENMT: Moist oral mucosa, no pharyngeal injection or exudates; normal dentition  NECK: Supple, no palpable masses; no thyromegaly  RESPIRATORY: Normal respiratory effort; lungs are clear to auscultation bilaterally  CARDIOVASCULAR: Regular rate and rhythm, normal S1 and S2, no murmur/rub/gallop; No lower extremity edema; Peripheral pulses are 2+ bilaterally  ABDOMEN: Nontender to palpation, normoactive bowel sounds, no rebound/guarding; No hepatosplenomegaly  MUSCULOSKELETAL:  no clubbing or cyanosis of digits; no joint swelling or tenderness to palpation  PSYCH: A+O to person, place, and time; affect appropriate  NEUROLOGY: CN 2-12 are intact and symmetric; no gross sensory deficits   SKIN: No rashes; no palpable lesions

## 2022-11-26 NOTE — PROGRESS NOTE ADULT - SUBJECTIVE AND OBJECTIVE BOX
Patient is a 82y Female     Patient is a 82y old  Female who presents with a chief complaint of BRBPR (26 Nov 2022 09:46)      HPI:  82F PMH CAD s/p 3vCABG, HTN, HLD, kidney stones, CKD, p/w 1.5 mo of difficulty with stooling and episode of BRBPR today and lightheadedness.   Pt has been struggling with trace blood with BMs for ~1.5mo & was seen 11/8 in the ED for same.   No prior colonoscopy/endoscopy for years, Family having trouble getting cardiac clearance for scopes outpatient    Denies CP, SOB, abm, N/V, fever, chills, palpitation  (24 Nov 2022 00:18)      PAST MEDICAL & SURGICAL HISTORY:  Hypertension      Kidney stones      Dyslipidemia      Depression      MRSA infection  nasal abscess      Coronary artery disease  s/p PCI with one stent to LAD 2009, CABG      Myocardial infarct      Cataracts, bilateral      Prediabetes      S/P coronary artery bypass graft x 3  RCA, OM &amp; LAD bypassed (SVG x 2, LIMA x 1)          MEDICATIONS  (STANDING):  amLODIPine   Tablet 5 milliGRAM(s) Oral daily  atorvastatin 80 milliGRAM(s) Oral at bedtime  bacitracin   Ointment 1 Application(s) Topical two times a day  budesonide  80 MICROgram(s)/formoterol 4.5 MICROgram(s) Inhaler 2 Puff(s) Inhalation two times a day  carvedilol 25 milliGRAM(s) Oral every 12 hours  clopidogrel Tablet 75 milliGRAM(s) Oral daily  dapagliflozin 5 milliGRAM(s) Oral daily  influenza  Vaccine (HIGH DOSE) 0.7 milliLiter(s) IntraMuscular once  isosorbide   mononitrate ER Tablet (IMDUR) 60 milliGRAM(s) Oral daily  mirtazapine 30 milliGRAM(s) Oral at bedtime  pantoprazole  Injectable 40 milliGRAM(s) IV Push every 12 hours  ranolazine 500 milliGRAM(s) Oral two times a day  spironolactone 25 milliGRAM(s) Oral daily      Allergies    aspirin (Other; Swelling)  latex (Pruritus; Rash)    Intolerances        SOCIAL HISTORY:  Denies ETOh,Smoking,     FAMILY HISTORY:      REVIEW OF SYSTEMS:    CONSTITUTIONAL: No weakness, fevers or chills  EYES/ENT: No visual changes;  No vertigo or throat pain   NECK: No pain or stiffness  RESPIRATORY: No cough, wheezing, hemoptysis; No shortness of breath  CARDIOVASCULAR: No chest pain or palpitations  GASTROINTESTINAL: No abdominal or epigastric pain. No nausea, vomiting, or hematemesis; No diarrhea or constipation. No melena or hematochezia.  GENITOURINARY: No dysuria, frequency or hematuria  NEUROLOGICAL: No numbness or weakness  SKIN: No itching, burning, rashes, or lesions   All other review of systems is negative unless indicated above.    VITAL:  T(C): , Max: 36.9 (11-25-22 @ 21:56)  T(F): , Max: 98.5 (11-25-22 @ 21:56)  HR: 59 (11-26-22 @ 03:43)  BP: 105/64 (11-26-22 @ 03:43)  BP(mean): --  RR: 18 (11-26-22 @ 03:43)  SpO2: 97% (11-26-22 @ 03:43)  Wt(kg): --    I and O's:    11-24 @ 07:01  -  11-25 @ 07:00  --------------------------------------------------------  IN: 520 mL / OUT: 0 mL / NET: 520 mL    11-25 @ 07:01  -  11-26 @ 07:00  --------------------------------------------------------  IN: 360 mL / OUT: 0 mL / NET: 360 mL    11-26 @ 07:01  -  11-26 @ 11:57  --------------------------------------------------------  IN: 120 mL / OUT: 0 mL / NET: 120 mL          PHYSICAL EXAM:    Constitutional: NAD  HEENT: PERRLA,   Neck: No JVD  Respiratory: CTA B/L  Cardiovascular: S1 and S2  Gastrointestinal: BS+, soft, NT/ND  Extremities: No peripheral edema  Neurological: A/O x 3, no focal deficits  Psychiatric: Normal mood, normal affect  : No Vines  Skin: No rashes  Access: Not applicable  Back: No CVA tenderness    LABS:                        11.3   4.02  )-----------( 181      ( 26 Nov 2022 07:05 )             33.5     11-25    140  |  107  |  17  ----------------------------<  76  4.1   |  22  |  1.88<H>    Ca    9.6      25 Nov 2022 06:24            RADIOLOGY & ADDITIONAL STUDIES:

## 2022-11-26 NOTE — DISCHARGE NOTE PROVIDER - CARE PROVIDER_API CALL
RORY PATEL [facility]  Internal Medicine  [address] [place] [address]  Phone: ()-  Fax: [phone]  Established Patient  Follow Up Time: 1 week    Omar Wahl (DO)  Gastroenterology; Internal Medicine  [address] [place] [address]  Phone: [phone]  Fax: [phone]  Follow Up Time:     Miguel Macdonald  Phone: (   )    -  Fax: (   )    -  Follow Up Time:    [facility]  [facility]  [address] [place] [address]  Phone: ()-  Fax: [phone]  Established Patient  Follow Up Time: 1 week    Omar Wahl (DO)  Gastroenterology; Internal Medicine  [address] [place] [address]  Phone: [phone]  Fax: [phone]  Follow Up Time:     Miguel Macdonald  Cardiology  Phone: (   )    -  Fax: (   )    -  Established Patient  Follow Up Time:    RORY PATEL [facility]  Internal Medicine  [address] [place] [address]  Phone: ()-  Fax: [phone]  Established Patient  Follow Up Time: 1 week    Miguel Macdonald  Cardiology  Phone: (   )    -  Fax: (   )    -  Established Patient  Follow Up Time:     Addie Steiner)  Gastroenterology  [address] [facility], [address]  [place] [address]  Phone: [phone]  Fax: [phone]  Follow Up Time: 1 week

## 2022-11-26 NOTE — DISCHARGE NOTE PROVIDER - PROVIDER TOKENS
PROVIDER:[TOKEN:[[id number]],FOLLOWUP:[1 week],ESTABLISHEDPATIENT:[T]],PROVIDER:[TOKEN:[[id number]]],FREE:[LAST:[Weisel],FIRST:[Miguel],PHONE:[(   )    -],FAX:[(   )    -]] PROVIDER:[TOKEN:[[id number]],FOLLOWUP:[1 week],ESTABLISHEDPATIENT:[T]],PROVIDER:[TOKEN:[[id number]]],FREE:[LAST:[Loril],FIRST:[Miguel],PHONE:[(   )    -],FAX:[(   )    -],ADDRESS:[Cardiology],ESTABLISHEDPATIENT:[T]] PROVIDER:[TOKEN:[[id number]],FOLLOWUP:[1 week],ESTABLISHEDPATIENT:[T]],FREE:[LAST:[Weisel],FIRST:[Miguel],PHONE:[(   )    -],FAX:[(   )    -],ADDRESS:[Cardiology],ESTABLISHEDPATIENT:[T]],PROVIDER:[TOKEN:[[id number]],FOLLOWUP:[1 week]]

## 2022-11-26 NOTE — DISCHARGE NOTE PROVIDER - NSDCCPCAREPLAN_GEN_ALL_CORE_FT
PRINCIPAL DISCHARGE DIAGNOSIS  Diagnosis: Bright red blood per rectum  Assessment and Plan of Treatment: - Continue Pantoprazole  - Obtain echocardiogram to complete cardiac clearance for outpatient endoscopy      SECONDARY DISCHARGE DIAGNOSES  Diagnosis: HTN (hypertension)  Assessment and Plan of Treatment:     Diagnosis: Hyperlipidemia  Assessment and Plan of Treatment:     Diagnosis: CAD (coronary artery disease)  Assessment and Plan of Treatment:      PRINCIPAL DISCHARGE DIAGNOSIS  Diagnosis: Acute GI bleeding  Assessment and Plan of Treatment: - Continue pantoprazole  - Obtain outpatient endoscopy after echocardiogram is obtained to complete your cardiac clearance      SECONDARY DISCHARGE DIAGNOSES  Diagnosis: Heart failure  Assessment and Plan of Treatment: -Continue home Carvedilol, Ranolazine, Farxiga, Pioglitazone, spironolactone  - Obtain outpatient echocardiogram to complete cardiac clearance for outpatient endoscopy    Diagnosis: HTN (hypertension)  Assessment and Plan of Treatment: - Continue carvedilol, spironolactone, amlodipine, isosorbide mononitrate.    Diagnosis: Stage 4 chronic kidney disease  Assessment and Plan of Treatment: Avoid taking (NSAIDs) - (ex: Ibuprofen, Advil, Celebrex, Naprosyn)  Avoid taking any nephrotoxic agents (can harm kidneys) - Intravenous contrast for diagnostic testing, combination cold medications.  Have all medications adjusted for your renal function by your Health Care Provider.  Blood pressure control is important.  Take all medication as prescribed.      Diagnosis: Hyperlipidemia  Assessment and Plan of Treatment: - Continue statin, vascepa    Diagnosis: CAD (coronary artery disease)  Assessment and Plan of Treatment: - Continue with home medical management     PRINCIPAL DISCHARGE DIAGNOSIS  Diagnosis: Acute GI bleeding  Assessment and Plan of Treatment: - Continue pantoprazole  - Obtain outpatient endoscopy after echocardiogram is obtained to complete your cardiac clearance  There are 2 common types of GI Bleed, Upper GI Bleed and Lower GI Bleed.  Upper GI Bleed affects the esophagus, stomach, and first part of the small intestine. Lower GI Bleed affects the colon and rectum.  Upper GI Bleed signs and symptoms to notify your Health Care Provider are vomiting blood, or coffee ground vomitus, and bowel movements that look like black tar.  Lower GI Bleed signs and symptoms to notify your health care provider are bright red bloody bowel movements.   Take your medications as prescribed by your Gastroenterologist.  If you have had an Endoscopy or Colonoscopy, follow up with your Gastroenterologist for Pathology results.  Avoid NSAIDs unless your Health Care Provider tells you that it is ok (Aspirin, Ibuprofen, Advil, Motrin, Aleve).  Follow up with your Gastroenterologist within 1-2 weeks of discharge.        SECONDARY DISCHARGE DIAGNOSES  Diagnosis: Heart failure  Assessment and Plan of Treatment: -Continue home Carvedilol, Ranolazine, Farxiga, Pioglitazone, spironolactone  - Obtain outpatient echocardiogram to complete cardiac clearance for outpatient endoscopy  Weigh yourself daily.  If you gain 3lbs in 3 days, or 5lbs in a week call your Health Care Provider.  Do not eat or drink foods containing more than 2000mg of salt (sodium) in your diet every day.  Call your Health Care Provider if you have any swelling or increased swelling in your feet, ankles, and/or stomach.  Take all of your medication as directed.  If you become dizzy call your Health Care Provider.      Diagnosis: HTN (hypertension)  Assessment and Plan of Treatment: - Continue carvedilol, spironolactone, amlodipine, isosorbide mononitrate.    Diagnosis: Stage 4 chronic kidney disease  Assessment and Plan of Treatment: Avoid taking (NSAIDs) - (ex: Ibuprofen, Advil, Celebrex, Naprosyn)  Avoid taking any nephrotoxic agents (can harm kidneys) - Intravenous contrast for diagnostic testing, combination cold medications.  Have all medications adjusted for your renal function by your Health Care Provider.  Blood pressure control is important.  Take all medication as prescribed.      Diagnosis: Hyperlipidemia  Assessment and Plan of Treatment: - Continue statin, vascepa    Diagnosis: CAD (coronary artery disease)  Assessment and Plan of Treatment: - Continue with home medical management  Coronary artery disease is a condition where the arteries the supply the heart muscle get clogges with fatty deposits & puts you at risk for a heart attack  Call your doctor if you have any new pain, pressure, or discomfort in the center of your chest, pain, tingling or discomfort in arms, back, neck, jaw, or stomach, shortness of breath, nausea, vomiting, burping or heartburn, sweating, cold and clammy skin, racing or abnormal heartbeat for more than 10 minutes or if they keep coming & going.  Call 911 and do not tr to get to hospital by care  You can help yourself with lefestyle changes (quitting smoking if you smoke), eat lots of fruits & vegetables & low fat dairy products, not a lot of meat & fatty foods, walk or some form of physical activity most days of the week, lose weight if you are overweight  Take your cardiac medication as prescribed to lower cholesterol, to lower blood pressure, aspirin to prevent blood clots, and diabetes control  Make sure to keep appointments with doctor for cardiac follow up care       PRINCIPAL DISCHARGE DIAGNOSIS  Diagnosis: Acute GI bleeding  Assessment and Plan of Treatment: - Continue pantoprazole  - Please follow up with a gastroenterologist for an outpatient colonoscopy  There are 2 common types of GI Bleed, Upper GI Bleed and Lower GI Bleed.  Upper GI Bleed affects the esophagus, stomach, and first part of the small intestine. Lower GI Bleed affects the colon and rectum.  Upper GI Bleed signs and symptoms to notify your Health Care Provider are vomiting blood, or coffee ground vomitus, and bowel movements that look like black tar.  Lower GI Bleed signs and symptoms to notify your health care provider are bright red bloody bowel movements.   Take your medications as prescribed by your Gastroenterologist.  If you have had an Endoscopy or Colonoscopy, follow up with your Gastroenterologist for Pathology results.  Avoid NSAIDs unless your Health Care Provider tells you that it is ok (Aspirin, Ibuprofen, Advil, Motrin, Aleve).  Follow up with your Gastroenterologist within 1-2 weeks of discharge.        SECONDARY DISCHARGE DIAGNOSES  Diagnosis: CAD (coronary artery disease)  Assessment and Plan of Treatment: - Continue with home medical management  Coronary artery disease is a condition where the arteries the supply the heart muscle get clogges with fatty deposits & puts you at risk for a heart attack  Call your doctor if you have any new pain, pressure, or discomfort in the center of your chest, pain, tingling or discomfort in arms, back, neck, jaw, or stomach, shortness of breath, nausea, vomiting, burping or heartburn, sweating, cold and clammy skin, racing or abnormal heartbeat for more than 10 minutes or if they keep coming & going.  Call 911 and do not tr to get to hospital by care  You can help yourself with lefestyle changes (quitting smoking if you smoke), eat lots of fruits & vegetables & low fat dairy products, not a lot of meat & fatty foods, walk or some form of physical activity most days of the week, lose weight if you are overweight  Take your cardiac medication as prescribed to lower cholesterol, to lower blood pressure, aspirin to prevent blood clots, and diabetes control  Make sure to keep appointments with doctor for cardiac follow up care      Diagnosis: HTN (hypertension)  Assessment and Plan of Treatment: Please follow up with your Primary Care Physician within 7 days after discharge for continued management of this chronic medical issue      Diagnosis: Hyperlipidemia  Assessment and Plan of Treatment: - Continue statin, vascepa    Diagnosis: Heart failure  Assessment and Plan of Treatment: Please follow up with your Cardiologist within 7 days after discharge for continued management of this chronic medical issue  Weigh yourself daily.  If you gain 3lbs in 3 days, or 5lbs in a week call your Health Care Provider.  Do not eat or drink foods containing more than 2000mg of salt (sodium) in your diet every day.  Call your Health Care Provider if you have any swelling or increased swelling in your feet, ankles, and/or stomach.  Take all of your medication as directed.  If you become dizzy call your Health Care Provider.      Diagnosis: Stage 4 chronic kidney disease  Assessment and Plan of Treatment: Avoid taking (NSAIDs) - (ex: Ibuprofen, Advil, Celebrex, Naprosyn)  Avoid taking any nephrotoxic agents (can harm kidneys) - Intravenous contrast for diagnostic testing, combination cold medications.  Have all medications adjusted for your renal function by your Health Care Provider.  Blood pressure control is important.  Take all medication as prescribed.       PRINCIPAL DISCHARGE DIAGNOSIS  Diagnosis: Acute GI bleeding  Assessment and Plan of Treatment: - Continue pantoprazole  - Please follow up with a gastroenterologist for an outpatient colonoscopy  There are 2 common types of GI Bleed, Upper GI Bleed and Lower GI Bleed.  Upper GI Bleed affects the esophagus, stomach, and first part of the small intestine. Lower GI Bleed affects the colon and rectum.  Upper GI Bleed signs and symptoms to notify your Health Care Provider are vomiting blood, or coffee ground vomitus, and bowel movements that look like black tar.  Lower GI Bleed signs and symptoms to notify your health care provider are bright red bloody bowel movements.   Take your medications as prescribed by your Gastroenterologist.  If you have had an Endoscopy or Colonoscopy, follow up with your Gastroenterologist for Pathology results.  Avoid NSAIDs unless your Health Care Provider tells you that it is ok (Aspirin, Ibuprofen, Advil, Motrin, Aleve).  Follow up with your Gastroenterologist within 1-2 weeks of discharge.        SECONDARY DISCHARGE DIAGNOSES  Diagnosis: CAD (coronary artery disease)  Assessment and Plan of Treatment: - Continue with home medical management  Coronary artery disease is a condition where the arteries the supply the heart muscle get clogges with fatty deposits & puts you at risk for a heart attack  Call your doctor if you have any new pain, pressure, or discomfort in the center of your chest, pain, tingling or discomfort in arms, back, neck, jaw, or stomach, shortness of breath, nausea, vomiting, burping or heartburn, sweating, cold and clammy skin, racing or abnormal heartbeat for more than 10 minutes or if they keep coming & going.  Call 911 and do not tr to get to hospital by care  You can help yourself with lefestyle changes (quitting smoking if you smoke), eat lots of fruits & vegetables & low fat dairy products, not a lot of meat & fatty foods, walk or some form of physical activity most days of the week, lose weight if you are overweight  Take your cardiac medication as prescribed to lower cholesterol, to lower blood pressure, aspirin to prevent blood clots, and diabetes control  Make sure to keep appointments with doctor for cardiac follow up care      Diagnosis: Hyperlipidemia  Assessment and Plan of Treatment: - Continue statin, vascepa    Diagnosis: Heart failure  Assessment and Plan of Treatment: Please follow up with your Cardiologist within 7 days after discharge for continued management of this chronic medical issue  Weigh yourself daily.  If you gain 3lbs in 3 days, or 5lbs in a week call your Health Care Provider.  Do not eat or drink foods containing more than 2000mg of salt (sodium) in your diet every day.  Call your Health Care Provider if you have any swelling or increased swelling in your feet, ankles, and/or stomach.  Take all of your medication as directed.  If you become dizzy call your Health Care Provider.      Diagnosis: Stage 4 chronic kidney disease  Assessment and Plan of Treatment: Avoid taking (NSAIDs) - (ex: Ibuprofen, Advil, Celebrex, Naprosyn)  Avoid taking any nephrotoxic agents (can harm kidneys) - Intravenous contrast for diagnostic testing, combination cold medications.  Have all medications adjusted for your renal function by your Health Care Provider.  Blood pressure control is important.  Take all medication as prescribed.      Diagnosis: Orthostatic hypotension  Assessment and Plan of Treatment: your medications were adjusted  change positions gradually especially getting out of bed

## 2022-11-26 NOTE — PROGRESS NOTE ADULT - SUBJECTIVE AND OBJECTIVE BOX
DATE OF SERVICE: 11-26-22 @ 17:50    Patient is a 82y old  Female who presents with a chief complaint of BRBPR (26 Nov 2022 11:57)      INTERVAL HISTORY: no complaints     REVIEW OF SYSTEMS:  CONSTITUTIONAL: No weakness  EYES/ENT: No visual changes;  No throat pain   NECK: No pain or stiffness  RESPIRATORY: No cough, wheezing; No shortness of breath  CARDIOVASCULAR: No chest pain or palpitations  GASTROINTESTINAL: No abdominal  pain. No nausea, vomiting, or hematemesis  GENITOURINARY: No dysuria, frequency or hematuria  NEUROLOGICAL: No stroke like symptoms  SKIN: No rashes    TELEMETRY Personally reviewed: Sinus bridget 50   	  MEDICATIONS:  amLODIPine   Tablet 5 milliGRAM(s) Oral daily  carvedilol 25 milliGRAM(s) Oral every 12 hours  isosorbide   mononitrate ER Tablet (IMDUR) 60 milliGRAM(s) Oral daily  ranolazine 500 milliGRAM(s) Oral two times a day  spironolactone 25 milliGRAM(s) Oral daily        PHYSICAL EXAM:  T(C): 36.6 (11-26-22 @ 12:00), Max: 36.9 (11-25-22 @ 21:56)  HR: 55 (11-26-22 @ 17:42) (48 - 59)  BP: 119/64 (11-26-22 @ 17:42) (105/64 - 119/64)  RR: 18 (11-26-22 @ 12:00) (18 - 18)  SpO2: 98% (11-26-22 @ 12:00) (95% - 98%)  Wt(kg): --  I&O's Summary    25 Nov 2022 07:01  -  26 Nov 2022 07:00  --------------------------------------------------------  IN: 360 mL / OUT: 0 mL / NET: 360 mL    26 Nov 2022 07:01  -  26 Nov 2022 17:50  --------------------------------------------------------  IN: 238 mL / OUT: 0 mL / NET: 238 mL          Appearance: In no distress	  HEENT:    PERRL, EOMI	  Cardiovascular:  S1 S2, No JVD  Respiratory: Lungs clear to auscultation	  Gastrointestinal:  Soft, Non-tender, + BS	  Vascularature:  No edema of LE  Psychiatric: Appropriate affect   Neuro: no acute focal deficits                               11.3   4.02  )-----------( 181      ( 26 Nov 2022 07:05 )             33.5     11-25    140  |  107  |  17  ----------------------------<  76  4.1   |  22  |  1.88<H>    Ca    9.6      25 Nov 2022 06:24          Labs personally reviewed      ASSESSMENT/PLAN: 	    82F PMH CAD s/p 3vCABG, HTN, HLD, kidney stones, CKD, p/w 1.5 mo of difficulty with stooling and episode of BRBPR today and lightheadedness.   Pt has been struggling with trace blood with BMs for ~1.5mo & was seen 11/8 in the ED for same. No prior colonoscopy/endoscopy for years, Family having trouble getting cardiac clearance for scopes outpatient. Followed by outpatient Cardiologist, Dr. Miguel Macdonald.     Problem/Plan -1  Problem: Heart Failure with preserved Ejection Fraction  - Prior TTE from 2019 with EF 63%, mild MR, + WMA, moderate diastolic dysfunction, normal RV size and function  - proBNP 11/7 529  - Patient appears to be on sHF GDMT. Will repeat TTE to assess for LVEF, WMA and valvular dysfunction  - For now will continue with sHF GDMT: Hydralazine 25 mg PO BID, coreg 25mg PO BID, Farxiga 5mg PO daily, spironolactone 25mg PO daily    Problem/Plan -2  Problem:  Coronary Artery Disease  - ECG SR unchanged from previous ECG  - Hx of CAD with hx of remote CABG (in 1990s) and subsequent PCI at [facility] [place] but could not recall years  - per OP cardio Dr Macdonald, cath anout 4 years ago with diffuse disease not amendable to PCI  - Reports average functional capacity. CHronic stable angina  - c/w coreg 25mg PO BID, atorvastatin 80mg PO daily (in place of rosuvastatin), imdur 60mg PO daily, ranexa 500mg PO BID  - Will repeat TTE to assess for WMA  -11/26: TTE done on 11/25, pending final read     Problem/Plan -3  Problem: HTN  - BP currently wnl.    Problem/Plan -4  Problem: HLD  - c/w statin  - Will resume Vascepa upon DC    Problem/Plan -5  Problem: Cardiac Risk Stratification  - Patient does not appear to be in decompensated HF  - No hx of tachy bridget arrhythmias  - No hx of severe AS/MS   - Patient is mod risk for mod risk colonoscopy. No contraindication to proceed pending review of TTE.          AWA Dao DO [facility]  Cardiovascular Medicine  [address] [place] [address], Suite [facility]  Office: [phone]  Available via call/text on [employer] Teams  DATE OF SERVICE: 11-26-22 @ 17:50    Patient is a 82y old  Female who presents with a chief complaint of BRBPR (26 Nov 2022 11:57)      INTERVAL HISTORY: no complaints     REVIEW OF SYSTEMS:  CONSTITUTIONAL: No weakness  EYES/ENT: No visual changes;  No throat pain   NECK: No pain or stiffness  RESPIRATORY: No cough, wheezing; No shortness of breath  CARDIOVASCULAR: No chest pain or palpitations  GASTROINTESTINAL: No abdominal  pain. No nausea, vomiting, or hematemesis  GENITOURINARY: No dysuria, frequency or hematuria  NEUROLOGICAL: No stroke like symptoms  SKIN: No rashes    TELEMETRY Personally reviewed: Sinus bridget 50   	  MEDICATIONS:  amLODIPine   Tablet 5 milliGRAM(s) Oral daily  carvedilol 25 milliGRAM(s) Oral every 12 hours  isosorbide   mononitrate ER Tablet (IMDUR) 60 milliGRAM(s) Oral daily  ranolazine 500 milliGRAM(s) Oral two times a day  spironolactone 25 milliGRAM(s) Oral daily        PHYSICAL EXAM:  T(C): 36.6 (11-26-22 @ 12:00), Max: 36.9 (11-25-22 @ 21:56)  HR: 55 (11-26-22 @ 17:42) (48 - 59)  BP: 119/64 (11-26-22 @ 17:42) (105/64 - 119/64)  RR: 18 (11-26-22 @ 12:00) (18 - 18)  SpO2: 98% (11-26-22 @ 12:00) (95% - 98%)  Wt(kg): --  I&O's Summary    25 Nov 2022 07:01  -  26 Nov 2022 07:00  --------------------------------------------------------  IN: 360 mL / OUT: 0 mL / NET: 360 mL    26 Nov 2022 07:01  -  26 Nov 2022 17:50  --------------------------------------------------------  IN: 238 mL / OUT: 0 mL / NET: 238 mL          Appearance: In no distress	  HEENT:    PERRL, EOMI	  Cardiovascular:  S1 S2, No JVD  Respiratory: Lungs clear to auscultation	  Gastrointestinal:  Soft, Non-tender, + BS	  Vascularature:  No edema of LE  Psychiatric: Appropriate affect   Neuro: no acute focal deficits                               11.3   4.02  )-----------( 181      ( 26 Nov 2022 07:05 )             33.5     11-25    140  |  107  |  17  ----------------------------<  76  4.1   |  22  |  1.88<H>    Ca    9.6      25 Nov 2022 06:24          Labs personally reviewed      ASSESSMENT/PLAN: 	    82F PMH CAD s/p 3vCABG, HTN, HLD, kidney stones, CKD, p/w 1.5 mo of difficulty with stooling and episode of BRBPR today and lightheadedness.   Pt has been struggling with trace blood with BMs for ~1.5mo & was seen 11/8 in the ED for same. No prior colonoscopy/endoscopy for years, Family having trouble getting cardiac clearance for scopes outpatient. Followed by outpatient Cardiologist, Dr. Miguel Macdonald.     Problem/Plan -1  Problem: Heart Failure with preserved Ejection Fraction  - Prior TTE from 2019 with EF 63%, mild MR, + WMA, moderate diastolic dysfunction, normal RV size and function  - proBNP 11/7 529  - Patient appears to be on sHF GDMT. Will repeat TTE to assess for LVEF, WMA and valvular dysfunction  - For now will continue with sHF GDMT: Hydralazine 25 mg PO BID, coreg 25mg PO BID, Farxiga 5mg PO daily, spironolactone 25mg PO daily    Problem/Plan -2  Problem:  Coronary Artery Disease  - ECG SR unchanged from previous ECG  - Hx of CAD with hx of remote CABG (in 1990s) and subsequent PCI at [facility] [place] but could not recall years  - per OP cardio Dr Macdonald, cath anout 4 years ago with diffuse disease not amendable to PCI  - Reports average functional capacity. CHronic stable angina  - c/w coreg 25mg PO BID, atorvastatin 80mg PO daily (in place of rosuvastatin), imdur 60mg PO daily, ranexa 500mg PO BID  - Will repeat TTE to assess for WMA  -11/26: TTE done on 11/25, pending final read     Problem/Plan -3  Problem: HTN  - BP currently wnl.    Problem/Plan -4  Problem: HLD  - c/w statin  - Will resume Vascepa upon DC    Problem/Plan -5  Problem: Cardiac Risk Stratification  - Patient does not appear to be in decompensated HF  - No hx of tachy bridget arrhythmias  - No hx of severe AS/MS   - Patient is mod risk for mod risk colonoscopy. No contraindication to proceed   - Discussed with OP cardio Dr Zack Carlton at [facility] who is in agreement           AWA Dao DO [facility]  Cardiovascular Medicine  [address] [place] [address], [place] [address]  Office: [phone]  Available via call/text on Microsoft Teams

## 2022-11-26 NOTE — DISCHARGE NOTE PROVIDER - NSDCMRMEDTOKEN_GEN_ALL_CORE_FT
amLODIPine 5 mg oral tablet: 1 tab(s) orally once a day  Breo Ellipta 100 mcg-25 mcg/inh inhalation powder: 1 puff(s) inhaled once a day  carvedilol 25 mg oral tablet: 1 tab(s) orally every 12 hours  clopidogrel 75 mg oral tablet: 1 tab(s) orally once a day  Farxiga 5 mg oral tablet: 1 tab(s) orally once a day  furosemide 20 mg oral tablet: 1 tab(s) orally once a day  hydrALAZINE 25 mg oral tablet: 1 tab(s) orally 2 times a day  isosorbide mononitrate 60 mg oral tablet, extended release: 1 tab(s) orally once a day  loratadine 5 mg/5 mL oral syrup: 10 milliliter(s) orally once a day  mirtazapine 30 mg oral tablet: 1 tab(s) orally once a day  pantoprazole 40 mg oral delayed release tablet: 1 tab(s) orally once a day (before a meal)  pioglitazone 15 mg oral tablet: 1 tab(s) orally once a day  ranolazine 500 mg oral tablet, extended release: 1 tab(s) orally 2 times a day  Restasis 0.05% ophthalmic emulsion: 1 drop(s) to each affected eye every 12 hours  rosuvastatin 40 mg oral tablet: 1 tab(s) orally once a day (at bedtime)  spironolactone 25 mg oral tablet: 1 tab(s) orally once a day  Vascepa 1 g oral capsule: 2 cap(s) orally 2 times a day   amLODIPine 5 mg oral tablet: 1 tab(s) orally once a day  Breo Ellipta 100 mcg-25 mcg/inh inhalation powder: 1 puff(s) inhaled once a day  carvedilol 25 mg oral tablet: 1 tab(s) orally every 12 hours  clopidogrel 75 mg oral tablet: 1 tab(s) orally once a day  Farxiga 5 mg oral tablet: 1 tab(s) orally once a day  isosorbide mononitrate 60 mg oral tablet, extended release: 1 tab(s) orally once a day  pantoprazole 40 mg oral delayed release tablet: 1 tab(s) orally once a day (before a meal)  pioglitazone 15 mg oral tablet: 1 tab(s) orally once a day  ranolazine 500 mg oral tablet, extended release: 1 tab(s) orally 2 times a day  Restasis 0.05% ophthalmic emulsion: 1 drop(s) to each affected eye every 12 hours  rosuvastatin 40 mg oral tablet: 1 tab(s) orally once a day (at bedtime)  spironolactone 25 mg oral tablet: 1 tab(s) orally once a day  Vascepa 1 g oral capsule: 2 cap(s) orally 2 times a day   amLODIPine 5 mg oral tablet: 1 tab(s) orally once a day  Breo Ellipta 100 mcg-25 mcg/inh inhalation powder: 1 puff(s) inhaled once a day  carvedilol 25 mg oral tablet: 1 tab(s) orally every 12 hours  clopidogrel 75 mg oral tablet: 1 tab(s) orally once a day  Farxiga 5 mg oral tablet: 1 tab(s) orally once a day  isosorbide mononitrate 60 mg oral tablet, extended release: 1 tab(s) orally once a day  pantoprazole 40 mg oral delayed release tablet: 1 tab(s) orally once a day (before a meal)  pioglitazone 15 mg oral tablet: 1 tab(s) orally once a day  ranolazine 500 mg oral tablet, extended release: 1 tab(s) orally 2 times a day  Restasis 0.05% ophthalmic emulsion: 1 drop(s) to each affected eye every 12 hours  rosuvastatin 40 mg oral tablet: 1 tab(s) orally once a day (at bedtime)  spironolactone 25 mg oral tablet: 1 tab(s) orally once a day  Transport wheel chair: DERRICK:[id number]  Dx;S72.001A/OQ.  Ht 5 feet 6 inches  Wt: 149.15 pounds  Vascepa 1 g oral capsule: 2 cap(s) orally 2 times a day   Breo Ellipta 100 mcg-25 mcg/inh inhalation powder: 1 puff(s) inhaled once a day  carvedilol 12.5 mg oral tablet: 1 tab(s) orally every 12 hours  clopidogrel 75 mg oral tablet: 1 tab(s) orally once a day  Farxiga 5 mg oral tablet: 1 tab(s) orally once a day  isosorbide mononitrate 60 mg oral tablet, extended release: 1 tab(s) orally once a day  ocular lubricant ophthalmic solution: 1 drop(s) to each affected eye every 4 hours  pantoprazole 40 mg oral delayed release tablet: 1 tab(s) orally 2 times a day  pioglitazone 15 mg oral tablet: 1 tab(s) orally once a day  ranolazine 500 mg oral tablet, extended release: 1 tab(s) orally 2 times a day  Restasis 0.05% ophthalmic emulsion: 1 drop(s) to each affected eye every 12 hours  rosuvastatin 40 mg oral tablet: 1 tab(s) orally once a day (at bedtime)  spironolactone 25 mg oral tablet: 1 tab(s) orally once a day  Vascepa 1 g oral capsule: 2 cap(s) orally 2 times a day

## 2022-11-26 NOTE — DISCHARGE NOTE PROVIDER - HOSPITAL COURSE
Patient is an 82 year old female with past medical history of Coronary Artery Disease s/p 3vCABG, high blood pressure, hyperlipidemia, kidney stones, Chronic Kidney Disease, presented to [facility] with 1.5 month of difficulty with stooling and episode of bright red blood per rectum today and lightheadedness. Pt has been struggling with trace blood with bowel movements for ~1.5mo & was seen 11/8 in the ED for same. No prior colonoscopy/endoscopy for years. Fecal occult blood test was positive for blood in stool. She was given IV pantoprazole. Patients' complete blood count has remained stable. GI states patient can have an outpatient endoscopy. Endoscopy will not be performed over holiday weekend unless emergent. Pt needs cardiac clearance for endoscopy.      Family having trouble getting cardiac clearance for scopes outpatient. Patient is an 82 year old female with past medical history of Coronary Artery Disease s/p 3vCABG, high blood pressure, hyperlipidemia, kidney stones, Chronic Kidney Disease, presented to [facility] with 1.5 month of difficulty with stooling and episode of bright red blood per rectum today and lightheadedness. Pt has been struggling with trace blood with bowel movements for ~1.5mo & was seen 11/8 in the ED for same. No prior colonoscopy/endoscopy for years. Fecal occult blood test was positive for blood in stool. She was given IV pantoprazole. Patients' complete blood count has remained stable. GI states patient can have an outpatient endoscopy. Endoscopy will not be performed over holiday weekend unless emergent. Pt needs cardiac clearance for endoscopy. Cardiology determined the patient is at moderate risk for colonoscopy. There is no contraindication to proceed pending review of new TTE to assess left ventricular ejection fraction, WMA and valvular dysfunction.    Patient is stable and medically cleared for discharge home. Patient is an 82 year old female with past medical history of Coronary Artery Disease s/p 3vCABG, high blood pressure, hyperlipidemia, kidney stones, Chronic Kidney Disease, presented to [facility] with 1.5 month of difficulty with stooling and episode of bright red blood per rectum today and lightheadedness. Pt has been struggling with trace blood with bowel movements for ~1.5mo & was seen 11/8 in the ED for same. No prior colonoscopy/endoscopy for years. Fecal occult blood test was positive for blood in stool. She was given IV pantoprazole. Patients' complete blood count has remained stable. CT scan of abdomen and pelvis do not reveal concern for malignancy. GI states patient can have an outpatient endoscopy. Endoscopy will not be performed over holiday weekend unless emergent. Pt needs cardiac clearance for endoscopy. Cardiology determined the patient is at moderate risk for colonoscopy. There is no contraindication to proceed pending review of new TTE to assess left ventricular ejection fraction, WMA and valvular dysfunction.    Patient is stable and medically cleared for discharge home. 82F past medical history of Coronary Artery Disease s/p 3vCABG, high blood pressure, hyperlipidemia, kidney stones, Chronic Kidney Disease, presented to [facility] with 1.5 month of difficulty with stooling and episode of bright red blood per rectum today and lightheadedness. Pt has been struggling with trace blood with bowel movements for ~1.5mo & was seen 11/8 in the ED for same. No prior colonoscopy/endoscopy for years. Fecal occult blood test was positive for blood in stool. She was given IV pantoprazole. Patients' complete blood count has remained stable. CT scan of abdomen and pelvis do not reveal concern for malignancy. GI states patient can have an outpatient endoscopy. Endoscopy will not be performed over holiday weekend unless emergent. Pt needs cardiac clearance for endoscopy. Cardiology determined the patient is at moderate risk for colonoscopy. There is no contraindication to proceed pending review of new TTE to assess left ventricular ejection fraction, WMA and valvular dysfunction.    Patient is stable and medically cleared for discharge home. 82F past medical history of Coronary Artery Disease s/p 3vCABG, high blood pressure, hyperlipidemia, kidney stones, Chronic Kidney Disease, presented to [facility] with 1.5 month of difficulty with stooling and episode of bright red blood per rectum today and lightheadedness. Pt has been struggling with trace blood with bowel movements for ~1.5mo & was seen 11/8 in the ED for same. No prior colonoscopy/endoscopy for years. Fecal occult blood test was positive for blood in stool. She was given IV pantoprazole. Patients' complete blood count has remained stable. CT scan of abdomen and pelvis do not reveal concern for malignancy. GI states patient can have an outpatient endoscopy. Endoscopy will not be performed over holiday weekend unless emergent. Pt needs cardiac clearance for endoscopy. Cardiology determined the patient is at moderate risk for colonoscopy. There is no contraindication to proceed. pending new TTE to assess left ventricular ejection fraction, WMA and valvular dysfunction. can be done outpatient    Patient is stable and medically cleared for discharge home. 82F past medical history of Coronary Artery Disease s/p 3vCABG, high blood pressure, hyperlipidemia, kidney stones, Chronic Kidney Disease, presented to [facility] with 1.5 month of difficulty with stooling and episode of bright red blood per rectum today and lightheadedness. Pt has been struggling with trace blood with bowel movements for ~1.5mo & was seen 11/8 in the ED for same. No prior colonoscopy/endoscopy for years. Fecal occult blood test was positive for blood in stool. She was given IV pantoprazole. Patients' complete blood count has remained stable. CT scan of abdomen and pelvis do not reveal concern for malignancy. GI states patient can have an outpatient endoscopy. Endoscopy will not be performed over holiday weekend unless emergent. Pt needs cardiac clearance for endoscopy. Cardiology determined the patient is at moderate risk for colonoscopy. There is no contraindication to proceed. Family refused CT head.     Patient is stable and medically cleared for discharge home.

## 2022-11-26 NOTE — DISCHARGE NOTE PROVIDER - NSDCFUADDAPPT_GEN_ALL_CORE_FT
APPTS ARE READY TO BE MADE: [ x] YES    Best Family or Patient Contact (if needed):    Additional Information about above appointments (if needed):    1:   2:   3:     Other comments or requests:    APPTS ARE READY TO BE MADE: [ x] YES    Best Family or Patient Contact (if needed):    Additional Information about above appointments (if needed):    1:   2:   3:     Other comments or requests:       Patient was provided with Dr. Villafuerte, Dr. Macdonald, Dr. Wahl and Dr. Steiner and was advised to call to schedule follow up within specified time frame. At this time patient declined scheduling assistance.

## 2022-11-26 NOTE — PROGRESS NOTE ADULT - SUBJECTIVE AND OBJECTIVE BOX
Andre Reyes, M.D.  Pager: [phone]  Office: [phone]    Patient is a 82y old  Female who presents with a chief complaint of BRBPR (25 Nov 2022 10:39)          SUBJECTIVE / OVERNIGHT EVENTS:    No acute overnight events.    ROS: ( - ) Fever, ( - )Chills,  ( - )Nausea/Vomiting, ( - ) Cough, ( - )Shortness of breath, ( - )Chest Pain    MEDICATIONS  (STANDING):  amLODIPine   Tablet 5 milliGRAM(s) Oral daily  atorvastatin 80 milliGRAM(s) Oral at bedtime  bacitracin   Ointment 1 Application(s) Topical two times a day  budesonide  80 MICROgram(s)/formoterol 4.5 MICROgram(s) Inhaler 2 Puff(s) Inhalation two times a day  carvedilol 25 milliGRAM(s) Oral every 12 hours  dapagliflozin 5 milliGRAM(s) Oral daily  influenza  Vaccine (HIGH DOSE) 0.7 milliLiter(s) IntraMuscular once  isosorbide   mononitrate ER Tablet (IMDUR) 60 milliGRAM(s) Oral daily  mirtazapine 30 milliGRAM(s) Oral at bedtime  pantoprazole  Injectable 40 milliGRAM(s) IV Push every 12 hours  ranolazine 500 milliGRAM(s) Oral two times a day  spironolactone 25 milliGRAM(s) Oral daily    MEDICATIONS  (PRN):  acetaminophen     Tablet .. 650 milliGRAM(s) Oral every 6 hours PRN Temp greater or equal to 38C (100.4F), Mild Pain (1 - 3)  aluminum hydroxide/magnesium hydroxide/simethicone Suspension 30 milliLiter(s) Oral every 4 hours PRN Dyspepsia  artificial  tears Solution 1 Drop(s) Both EYES every 4 hours PRN dry eye  melatonin 3 milliGRAM(s) Oral at bedtime PRN Insomnia  ondansetron Injectable 4 milliGRAM(s) IV Push every 8 hours PRN Nausea and/or Vomiting          T(C): 36.6 (11-26 @ 03:43), Max: 36.9 (11-25 @ 21:56)   HR: 59   BP: 105/64   RR: 18   SpO2: 97%    PHYSICAL EXAM:    CONSTITUTIONAL: NAD, well-developed, well-groomed  EYES: PERRLA; conjunctiva and sclera clear  ENMT: Moist oral mucosa, no pharyngeal injection or exudates; normal dentition  NECK: Supple, no palpable masses; no thyromegaly  RESPIRATORY: Normal respiratory effort; lungs are clear to auscultation bilaterally  CARDIOVASCULAR: Regular rate and rhythm, normal S1 and S2, no murmur/rub/gallop; No lower extremity edema; Peripheral pulses are 2+ bilaterally  ABDOMEN: Nontender to palpation, normoactive bowel sounds, no rebound/guarding; No hepatosplenomegaly  MUSCULOSKELETAL:  no clubbing or cyanosis of digits; no joint swelling or tenderness to palpation  PSYCH: A+O to person, place, and time; affect appropriate  NEUROLOGY: CN 2-12 are intact and symmetric; no gross sensory deficits   SKIN: No rashes; no palpable lesions      LABS:                        11.3   4.02  )-----------( 181      ( 26 Nov 2022 07:05 )             33.5      11-25    140  |  107  |  17  ----------------------------<  76  4.1   |  22  |  1.88<H>    Ca    9.6      25 Nov 2022 06:24         CAPILLARY BLOOD GLUCOSE          RADIOLOGY & ADDITIONAL TESTS:    Imaging Personally Reviewed:  Consultant(s) Notes Reviewed:    Care Discussed with Consultants/Other Providers:   Andre Reyes, M.D.  Pager: [phone]  Office: [phone]    Patient is a 82y old  Female who presents with a chief complaint of BRBPR (25 Nov 2022 10:39)          SUBJECTIVE / OVERNIGHT EVENTS:    No acute overnight events.  no bowel movements  abd discomfort have resolved    ROS: ( - ) Fever, ( - )Chills,  ( - )Nausea/Vomiting, ( - ) Cough, ( - )Shortness of breath, ( - )Chest Pain    MEDICATIONS  (STANDING):  amLODIPine   Tablet 5 milliGRAM(s) Oral daily  atorvastatin 80 milliGRAM(s) Oral at bedtime  bacitracin   Ointment 1 Application(s) Topical two times a day  budesonide  80 MICROgram(s)/formoterol 4.5 MICROgram(s) Inhaler 2 Puff(s) Inhalation two times a day  carvedilol 25 milliGRAM(s) Oral every 12 hours  dapagliflozin 5 milliGRAM(s) Oral daily  influenza  Vaccine (HIGH DOSE) 0.7 milliLiter(s) IntraMuscular once  isosorbide   mononitrate ER Tablet (IMDUR) 60 milliGRAM(s) Oral daily  mirtazapine 30 milliGRAM(s) Oral at bedtime  pantoprazole  Injectable 40 milliGRAM(s) IV Push every 12 hours  ranolazine 500 milliGRAM(s) Oral two times a day  spironolactone 25 milliGRAM(s) Oral daily    MEDICATIONS  (PRN):  acetaminophen     Tablet .. 650 milliGRAM(s) Oral every 6 hours PRN Temp greater or equal to 38C (100.4F), Mild Pain (1 - 3)  aluminum hydroxide/magnesium hydroxide/simethicone Suspension 30 milliLiter(s) Oral every 4 hours PRN Dyspepsia  artificial  tears Solution 1 Drop(s) Both EYES every 4 hours PRN dry eye  melatonin 3 milliGRAM(s) Oral at bedtime PRN Insomnia  ondansetron Injectable 4 milliGRAM(s) IV Push every 8 hours PRN Nausea and/or Vomiting          T(C): 36.6 (11-26 @ 03:43), Max: 36.9 (11-25 @ 21:56)   HR: 59   BP: 105/64   RR: 18   SpO2: 97%    PHYSICAL EXAM:    CONSTITUTIONAL: NAD, well-developed, well-groomed  EYES: PERRLA; conjunctiva and sclera clear  ENMT: Moist oral mucosa, no pharyngeal injection or exudates; normal dentition  NECK: Supple, no palpable masses; no thyromegaly  RESPIRATORY: Normal respiratory effort; lungs are clear to auscultation bilaterally  CARDIOVASCULAR: Regular rate and rhythm, normal S1 and S2, no murmur/rub/gallop; No lower extremity edema; Peripheral pulses are 2+ bilaterally  ABDOMEN: Nontender to palpation, normoactive bowel sounds, no rebound/guarding; No hepatosplenomegaly  MUSCULOSKELETAL:  no clubbing or cyanosis of digits; no joint swelling or tenderness to palpation  PSYCH: A+O to person, place, and time; affect appropriate  NEUROLOGY: CN 2-12 are intact and symmetric; no gross sensory deficits   SKIN: No rashes; no palpable lesions      LABS:                        11.3   4.02  )-----------( 181      ( 26 Nov 2022 07:05 )             33.5      11-25    140  |  107  |  17  ----------------------------<  76  4.1   |  22  |  1.88<H>    Ca    9.6      25 Nov 2022 06:24         CAPILLARY BLOOD GLUCOSE          RADIOLOGY & ADDITIONAL TESTS:    Imaging Personally Reviewed:  Consultant(s) Notes Reviewed:    Care Discussed with Consultants/Other Providers:

## 2022-11-27 DIAGNOSIS — I95.1 ORTHOSTATIC HYPOTENSION: ICD-10-CM

## 2022-11-27 LAB — GLUCOSE BLDC GLUCOMTR-MCNC: 94 MG/DL — SIGNIFICANT CHANGE UP (ref 70–99)

## 2022-11-27 PROCEDURE — 99233 SBSQ HOSP IP/OBS HIGH 50: CPT

## 2022-11-27 RX ORDER — MIRTAZAPINE 45 MG/1
1 TABLET, ORALLY DISINTEGRATING ORAL
Qty: 0 | Refills: 0 | DISCHARGE

## 2022-11-27 RX ORDER — CARVEDILOL PHOSPHATE 80 MG/1
12.5 CAPSULE, EXTENDED RELEASE ORAL EVERY 12 HOURS
Refills: 0 | Status: DISCONTINUED | OUTPATIENT
Start: 2022-11-27 | End: 2022-11-29

## 2022-11-27 RX ORDER — SODIUM CHLORIDE 9 MG/ML
500 INJECTION, SOLUTION INTRAVENOUS
Refills: 0 | Status: DISCONTINUED | OUTPATIENT
Start: 2022-11-27 | End: 2022-11-28

## 2022-11-27 RX ADMIN — Medication 1 APPLICATION(S): at 05:26

## 2022-11-27 RX ADMIN — RANOLAZINE 500 MILLIGRAM(S): 500 TABLET, FILM COATED, EXTENDED RELEASE ORAL at 17:14

## 2022-11-27 RX ADMIN — Medication 30 MILLILITER(S): at 10:47

## 2022-11-27 RX ADMIN — SPIRONOLACTONE 25 MILLIGRAM(S): 25 TABLET, FILM COATED ORAL at 05:20

## 2022-11-27 RX ADMIN — ATORVASTATIN CALCIUM 80 MILLIGRAM(S): 80 TABLET, FILM COATED ORAL at 22:05

## 2022-11-27 RX ADMIN — PANTOPRAZOLE SODIUM 40 MILLIGRAM(S): 20 TABLET, DELAYED RELEASE ORAL at 17:14

## 2022-11-27 RX ADMIN — CARVEDILOL PHOSPHATE 12.5 MILLIGRAM(S): 80 CAPSULE, EXTENDED RELEASE ORAL at 17:14

## 2022-11-27 RX ADMIN — SODIUM CHLORIDE 50 MILLILITER(S): 9 INJECTION, SOLUTION INTRAVENOUS at 14:11

## 2022-11-27 RX ADMIN — MIRTAZAPINE 30 MILLIGRAM(S): 45 TABLET, ORALLY DISINTEGRATING ORAL at 22:06

## 2022-11-27 RX ADMIN — DAPAGLIFLOZIN 5 MILLIGRAM(S): 10 TABLET, FILM COATED ORAL at 12:29

## 2022-11-27 RX ADMIN — RANOLAZINE 500 MILLIGRAM(S): 500 TABLET, FILM COATED, EXTENDED RELEASE ORAL at 05:20

## 2022-11-27 RX ADMIN — CLOPIDOGREL BISULFATE 75 MILLIGRAM(S): 75 TABLET, FILM COATED ORAL at 12:28

## 2022-11-27 RX ADMIN — Medication 1 APPLICATION(S): at 17:01

## 2022-11-27 RX ADMIN — PANTOPRAZOLE SODIUM 40 MILLIGRAM(S): 20 TABLET, DELAYED RELEASE ORAL at 05:20

## 2022-11-27 RX ADMIN — ISOSORBIDE MONONITRATE 60 MILLIGRAM(S): 60 TABLET, EXTENDED RELEASE ORAL at 12:29

## 2022-11-27 RX ADMIN — Medication 0.25 MILLIGRAM(S): at 20:48

## 2022-11-27 RX ADMIN — AMLODIPINE BESYLATE 5 MILLIGRAM(S): 2.5 TABLET ORAL at 05:20

## 2022-11-27 RX ADMIN — Medication 0.25 MILLIGRAM(S): at 14:18

## 2022-11-27 NOTE — PROVIDER CONTACT NOTE (OTHER) - ACTION/TREATMENT ORDERED:
NP aware & assessed patient & reviewed all orders, labs, history & plan. No new orders per NP. Fall Precautions maintained. NP assessed patient & reviewed orders, labs, history & plan. NP ordered to ambulate patient. Patient ambulated with assistance but complained of dizziness & NP notified. Fall Precautions maintained.

## 2022-11-27 NOTE — PROVIDER CONTACT NOTE (OTHER) - BACKGROUND
Patient admitted for Acute GI Bleeding, Heart Failure, Chronic Kidney Disease, Hypertension, Coronary Artery Disease.

## 2022-11-27 NOTE — PROGRESS NOTE ADULT - SUBJECTIVE AND OBJECTIVE BOX
Patient is a 82y Female     Patient is a 82y old  Female who presents with a chief complaint of BRBPR (27 Nov 2022 08:51)      HPI:  82F PMH CAD s/p 3vCABG, HTN, HLD, kidney stones, CKD, p/w 1.5 mo of difficulty with stooling and episode of BRBPR today and lightheadedness.   Pt has been struggling with trace blood with BMs for ~1.5mo & was seen 11/8 in the ED for same.   No prior colonoscopy/endoscopy for years, Family having trouble getting cardiac clearance for scopes outpatient    Denies CP, SOB, abm, N/V, fever, chills, palpitation  (24 Nov 2022 00:18)      PAST MEDICAL & SURGICAL HISTORY:  Hypertension      Kidney stones      Dyslipidemia      Depression      MRSA infection  nasal abscess      Coronary artery disease  s/p PCI with one stent to LAD 2009, CABG      Myocardial infarct      Cataracts, bilateral      Prediabetes      S/P coronary artery bypass graft x 3  RCA, OM &amp; LAD bypassed (SVG x 2, LIMA x 1)          MEDICATIONS  (STANDING):  amLODIPine   Tablet 5 milliGRAM(s) Oral daily  atorvastatin 80 milliGRAM(s) Oral at bedtime  bacitracin   Ointment 1 Application(s) Topical two times a day  budesonide  80 MICROgram(s)/formoterol 4.5 MICROgram(s) Inhaler 2 Puff(s) Inhalation two times a day  carvedilol 25 milliGRAM(s) Oral every 12 hours  clopidogrel Tablet 75 milliGRAM(s) Oral daily  dapagliflozin 5 milliGRAM(s) Oral daily  influenza  Vaccine (HIGH DOSE) 0.7 milliLiter(s) IntraMuscular once  isosorbide   mononitrate ER Tablet (IMDUR) 60 milliGRAM(s) Oral daily  mirtazapine 30 milliGRAM(s) Oral at bedtime  pantoprazole  Injectable 40 milliGRAM(s) IV Push every 12 hours  ranolazine 500 milliGRAM(s) Oral two times a day  spironolactone 25 milliGRAM(s) Oral daily      Allergies    aspirin (Other; Swelling)  latex (Pruritus; Rash)    Intolerances        SOCIAL HISTORY:  Denies ETOh,Smoking,     FAMILY HISTORY:      REVIEW OF SYSTEMS:    CONSTITUTIONAL: No weakness, fevers or chills  EYES/ENT: No visual changes;  No vertigo or throat pain   NECK: No pain or stiffness  RESPIRATORY: No cough, wheezing, hemoptysis; No shortness of breath  CARDIOVASCULAR: No chest pain or palpitations  GASTROINTESTINAL: No abdominal or epigastric pain. No nausea, vomiting, or hematemesis; No diarrhea or constipation. No melena or hematochezia.  GENITOURINARY: No dysuria, frequency or hematuria  NEUROLOGICAL: No numbness or weakness  SKIN: No itching, burning, rashes, or lesions   All other review of systems is negative unless indicated above.    VITAL:  T(C): , Max: 36.9 (11-26-22 @ 20:53)  T(F): , Max: 98.4 (11-26-22 @ 20:53)  HR: 56 (11-27-22 @ 03:48)  BP: 103/58 (11-27-22 @ 03:48)  BP(mean): --  RR: 18 (11-27-22 @ 03:48)  SpO2: 97% (11-27-22 @ 03:48)  Wt(kg): --    I and O's:    11-25 @ 07:01  -  11-26 @ 07:00  --------------------------------------------------------  IN: 360 mL / OUT: 0 mL / NET: 360 mL    11-26 @ 07:01  -  11-27 @ 07:00  --------------------------------------------------------  IN: 538 mL / OUT: 0 mL / NET: 538 mL          PHYSICAL EXAM:    Constitutional: NAD  HEENT: PERRLA,   Neck: No JVD  Respiratory: CTA B/L  Cardiovascular: S1 and S2  Gastrointestinal: BS+, soft, NT/ND  Extremities: No peripheral edema  Neurological: A/O x 3, no focal deficits  Psychiatric: Normal mood, normal affect  : No Vines  Skin: No rashes  Access: Not applicable  Back: No CVA tenderness    LABS:                        11.3   4.02  )-----------( 181      ( 26 Nov 2022 07:05 )             33.5                 RADIOLOGY & ADDITIONAL STUDIES:

## 2022-11-27 NOTE — PROGRESS NOTE ADULT - SUBJECTIVE AND OBJECTIVE BOX
DATE OF SERVICE: 11-27-22 @ 15:08    Patient is a 82y old  Female who presents with a chief complaint of BRBPR (27 Nov 2022 09:57)      INTERVAL HISTORY: no complaints     REVIEW OF SYSTEMS:  CONSTITUTIONAL: No weakness  EYES/ENT: No visual changes;  No throat pain   NECK: No pain or stiffness  RESPIRATORY: No cough, wheezing; No shortness of breath  CARDIOVASCULAR: No chest pain or palpitations  GASTROINTESTINAL: No abdominal  pain. No nausea, vomiting, or hematemesis  GENITOURINARY: No dysuria, frequency or hematuria  NEUROLOGICAL: No stroke like symptoms  SKIN: No rashes    TELEMETRY Personally reviewed: nsr 60  	  MEDICATIONS:  carvedilol 12.5 milliGRAM(s) Oral every 12 hours  isosorbide   mononitrate ER Tablet (IMDUR) 60 milliGRAM(s) Oral daily  ranolazine 500 milliGRAM(s) Oral two times a day  spironolactone 25 milliGRAM(s) Oral daily        PHYSICAL EXAM:  T(C): 36.7 (11-27-22 @ 12:06), Max: 36.9 (11-26-22 @ 20:53)  HR: 61 (11-27-22 @ 12:06) (55 - 61)  BP: 123/67 (11-27-22 @ 12:06) (103/58 - 123/67)  RR: 18 (11-27-22 @ 12:06) (18 - 18)  SpO2: 99% (11-27-22 @ 12:06) (97% - 99%)  Wt(kg): --  I&O's Summary    26 Nov 2022 07:01  -  27 Nov 2022 07:00  --------------------------------------------------------  IN: 538 mL / OUT: 0 mL / NET: 538 mL    27 Nov 2022 07:01  -  27 Nov 2022 15:08  --------------------------------------------------------  IN: 360 mL / OUT: 0 mL / NET: 360 mL          Appearance: In no distress	  HEENT:    PERRL, EOMI	  Cardiovascular:  S1 S2, No JVD  Respiratory: Lungs clear to auscultation	  Gastrointestinal:  Soft, Non-tender, + BS	  Vascularature:  No edema of LE  Psychiatric: Appropriate affect   Neuro: no acute focal deficits                               11.3   4.02  )-----------( 181      ( 26 Nov 2022 07:05 )             33.5               Labs personally reviewed      ASSESSMENT/PLAN: 	  82F PMH CAD s/p 3vCABG, HTN, HLD, kidney stones, CKD, p/w 1.5 mo of difficulty with stooling and episode of BRBPR today and lightheadedness.   Pt has been struggling with trace blood with BMs for ~1.5mo & was seen 11/8 in the ED for same. No prior colonoscopy/endoscopy for years, Family having trouble getting cardiac clearance for scopes outpatient. Followed by outpatient Cardiologist, Dr. Miguel Macdonald.     Problem/Plan -1  Problem: Heart Failure with preserved Ejection Fraction  - Prior TTE from 2019 with EF 63%, mild MR, + WMA, moderate diastolic dysfunction, normal RV size and function  - proBNP 11/7 529  - Patient appears to be on sHF GDMT. Will repeat TTE to assess for LVEF, WMA and valvular dysfunction  - For now will continue with sHF GDMT: Hydralazine 25 mg PO BID, coreg 25mg PO BID, Farxiga 5mg PO daily, spironolactone 25mg PO daily    Problem/Plan -2  Problem:  Coronary Artery Disease  - ECG SR unchanged from previous ECG  - Hx of CAD with hx of remote CABG (in 1990s) and subsequent PCI at [facility] but could not recall years  - per OP cardio Dr Macdonald, cath anout 4 years ago with diffuse disease not amendable to PCI  - Reports average functional capacity. CHronic stable angina  - c/w coreg 25mg PO BID, atorvastatin 80mg PO daily (in place of rosuvastatin), imdur 60mg PO daily, ranexa 500mg PO BID  - Will repeat TTE to assess for WMA  -11/26: TTE done on 11/25, pending final read     Problem/Plan -3  Problem: HTN  - BP currently wnl.    Problem/Plan -4  Problem: HLD  - c/w statin  - Will resume Vascepa upon DC    Problem/Plan -5  Problem: Cardiac Risk Stratification  - Patient does not appear to be in decompensated HF  - No hx of tachy bridget arrhythmias  - No hx of severe AS/MS   - Patient is mod risk for mod risk colonoscopy. No contraindication to proceed   - Discussed with OP cardio Dr Zack Carlton at [facility] who is in agreement             AWA Dao DO [facility]  Cardiovascular Medicine  [address] [place] [address], [address] [facility]  Office: [phone]  Available via call/text on [employer] Teams

## 2022-11-27 NOTE — PROGRESS NOTE ADULT - SUBJECTIVE AND OBJECTIVE BOX
Andre Reyes, M.D.  Pager: [phone]  Office: [phone]    Patient is a 82y old  Female who presents with a chief complaint of BRBPR (26 Nov 2022 17:50)          SUBJECTIVE / OVERNIGHT EVENTS:    No acute overnight events.    ROS: ( - ) Fever, ( - )Chills,  ( - )Nausea/Vomiting, ( - ) Cough, ( - )Shortness of breath, ( - )Chest Pain    MEDICATIONS  (STANDING):  amLODIPine   Tablet 5 milliGRAM(s) Oral daily  atorvastatin 80 milliGRAM(s) Oral at bedtime  bacitracin   Ointment 1 Application(s) Topical two times a day  budesonide  80 MICROgram(s)/formoterol 4.5 MICROgram(s) Inhaler 2 Puff(s) Inhalation two times a day  carvedilol 25 milliGRAM(s) Oral every 12 hours  clopidogrel Tablet 75 milliGRAM(s) Oral daily  dapagliflozin 5 milliGRAM(s) Oral daily  influenza  Vaccine (HIGH DOSE) 0.7 milliLiter(s) IntraMuscular once  isosorbide   mononitrate ER Tablet (IMDUR) 60 milliGRAM(s) Oral daily  mirtazapine 30 milliGRAM(s) Oral at bedtime  pantoprazole  Injectable 40 milliGRAM(s) IV Push every 12 hours  ranolazine 500 milliGRAM(s) Oral two times a day  spironolactone 25 milliGRAM(s) Oral daily    MEDICATIONS  (PRN):  acetaminophen     Tablet .. 650 milliGRAM(s) Oral every 6 hours PRN Temp greater or equal to 38C (100.4F), Mild Pain (1 - 3)  aluminum hydroxide/magnesium hydroxide/simethicone Suspension 30 milliLiter(s) Oral every 4 hours PRN Dyspepsia  artificial  tears Solution 1 Drop(s) Both EYES every 4 hours PRN dry eye  melatonin 3 milliGRAM(s) Oral at bedtime PRN Insomnia  ondansetron Injectable 4 milliGRAM(s) IV Push every 8 hours PRN Nausea and/or Vomiting          T(C): 36.6 (11-27 @ 03:48), Max: 36.9 (11-26 @ 20:53)   HR: 56   BP: 103/58   RR: 18   SpO2: 97%    PHYSICAL EXAM:    CONSTITUTIONAL: NAD, well-developed, well-groomed  EYES: PERRLA; conjunctiva and sclera clear  ENMT: Moist oral mucosa, no pharyngeal injection or exudates; normal dentition  NECK: Supple, no palpable masses; no thyromegaly  RESPIRATORY: Normal respiratory effort; lungs are clear to auscultation bilaterally  CARDIOVASCULAR: Regular rate and rhythm, normal S1 and S2, no murmur/rub/gallop; No lower extremity edema; Peripheral pulses are 2+ bilaterally  ABDOMEN: Nontender to palpation, normoactive bowel sounds, no rebound/guarding; No hepatosplenomegaly  MUSCULOSKELETAL:  no clubbing or cyanosis of digits; no joint swelling or tenderness to palpation  PSYCH: A+O to person, place, and time; affect appropriate  NEUROLOGY: CN 2-12 are intact and symmetric; no gross sensory deficits   SKIN: No rashes; no palpable lesions      LABS:                        11.3   4.02  )-----------( 181      ( 26 Nov 2022 07:05 )             33.5               CAPILLARY BLOOD GLUCOSE          RADIOLOGY & ADDITIONAL TESTS:    Imaging Personally Reviewed:  Consultant(s) Notes Reviewed:    Care Discussed with Consultants/Other Providers:   Andre Reyes, M.D.  Pager: [phone]  Office: [phone]    Patient is a 82y old  Female who presents with a chief complaint of BRBPR (26 Nov 2022 17:50)          SUBJECTIVE / OVERNIGHT EVENTS:      pt denies any bowel movements  pt was reporting dizziness with standing and ambulation    ROS: ( - ) Fever, ( - )Chills,  ( - )Nausea/Vomiting, ( - ) Cough, ( - )Shortness of breath, ( - )Chest Pain    MEDICATIONS  (STANDING):  amLODIPine   Tablet 5 milliGRAM(s) Oral daily  atorvastatin 80 milliGRAM(s) Oral at bedtime  bacitracin   Ointment 1 Application(s) Topical two times a day  budesonide  80 MICROgram(s)/formoterol 4.5 MICROgram(s) Inhaler 2 Puff(s) Inhalation two times a day  carvedilol 25 milliGRAM(s) Oral every 12 hours  clopidogrel Tablet 75 milliGRAM(s) Oral daily  dapagliflozin 5 milliGRAM(s) Oral daily  influenza  Vaccine (HIGH DOSE) 0.7 milliLiter(s) IntraMuscular once  isosorbide   mononitrate ER Tablet (IMDUR) 60 milliGRAM(s) Oral daily  mirtazapine 30 milliGRAM(s) Oral at bedtime  pantoprazole  Injectable 40 milliGRAM(s) IV Push every 12 hours  ranolazine 500 milliGRAM(s) Oral two times a day  spironolactone 25 milliGRAM(s) Oral daily    MEDICATIONS  (PRN):  acetaminophen     Tablet .. 650 milliGRAM(s) Oral every 6 hours PRN Temp greater or equal to 38C (100.4F), Mild Pain (1 - 3)  aluminum hydroxide/magnesium hydroxide/simethicone Suspension 30 milliLiter(s) Oral every 4 hours PRN Dyspepsia  artificial  tears Solution 1 Drop(s) Both EYES every 4 hours PRN dry eye  melatonin 3 milliGRAM(s) Oral at bedtime PRN Insomnia  ondansetron Injectable 4 milliGRAM(s) IV Push every 8 hours PRN Nausea and/or Vomiting          T(C): 36.6 (11-27 @ 03:48), Max: 36.9 (11-26 @ 20:53)   HR: 56   BP: 103/58   RR: 18   SpO2: 97%    PHYSICAL EXAM:    CONSTITUTIONAL: NAD, well-developed, well-groomed  EYES: PERRLA; conjunctiva and sclera clear  ENMT: Moist oral mucosa, no pharyngeal injection or exudates; normal dentition  NECK: Supple, no palpable masses; no thyromegaly  RESPIRATORY: Normal respiratory effort; lungs are clear to auscultation bilaterally  CARDIOVASCULAR: Regular rate and rhythm, normal S1 and S2, no murmur/rub/gallop; No lower extremity edema; Peripheral pulses are 2+ bilaterally  ABDOMEN: Nontender to palpation, normoactive bowel sounds, no rebound/guarding; No hepatosplenomegaly  MUSCULOSKELETAL:  no clubbing or cyanosis of digits; no joint swelling or tenderness to palpation  PSYCH: A+O to person, place, and time; affect appropriate  NEUROLOGY: CN 2-12 are intact and symmetric; no gross sensory deficits   SKIN: No rashes; no palpable lesions      LABS:                        11.3   4.02  )-----------( 181      ( 26 Nov 2022 07:05 )             33.5               CAPILLARY BLOOD GLUCOSE          RADIOLOGY & ADDITIONAL TESTS:    Imaging Personally Reviewed:  Consultant(s) Notes Reviewed:    Care Discussed with Consultants/Other Providers:

## 2022-11-27 NOTE — PROVIDER CONTACT NOTE (OTHER) - RECOMMENDATIONS
Assess patient. Review patient's orders, labs, history & plan. Address patient's lightheadedness and blurry vision.

## 2022-11-28 LAB
RAPID RVP RESULT: SIGNIFICANT CHANGE UP
SARS-COV-2 RNA SPEC QL NAA+PROBE: SIGNIFICANT CHANGE UP

## 2022-11-28 PROCEDURE — 99233 SBSQ HOSP IP/OBS HIGH 50: CPT

## 2022-11-28 RX ORDER — SODIUM CHLORIDE 9 MG/ML
500 INJECTION, SOLUTION INTRAVENOUS
Refills: 0 | Status: DISCONTINUED | OUTPATIENT
Start: 2022-11-28 | End: 2022-11-29

## 2022-11-28 RX ADMIN — RANOLAZINE 500 MILLIGRAM(S): 500 TABLET, FILM COATED, EXTENDED RELEASE ORAL at 06:26

## 2022-11-28 RX ADMIN — CARVEDILOL PHOSPHATE 12.5 MILLIGRAM(S): 80 CAPSULE, EXTENDED RELEASE ORAL at 06:28

## 2022-11-28 RX ADMIN — PANTOPRAZOLE SODIUM 40 MILLIGRAM(S): 20 TABLET, DELAYED RELEASE ORAL at 06:26

## 2022-11-28 RX ADMIN — DAPAGLIFLOZIN 5 MILLIGRAM(S): 10 TABLET, FILM COATED ORAL at 12:00

## 2022-11-28 RX ADMIN — Medication 1 APPLICATION(S): at 22:02

## 2022-11-28 RX ADMIN — ATORVASTATIN CALCIUM 80 MILLIGRAM(S): 80 TABLET, FILM COATED ORAL at 22:01

## 2022-11-28 RX ADMIN — PANTOPRAZOLE SODIUM 40 MILLIGRAM(S): 20 TABLET, DELAYED RELEASE ORAL at 17:29

## 2022-11-28 RX ADMIN — RANOLAZINE 500 MILLIGRAM(S): 500 TABLET, FILM COATED, EXTENDED RELEASE ORAL at 17:29

## 2022-11-28 RX ADMIN — SPIRONOLACTONE 25 MILLIGRAM(S): 25 TABLET, FILM COATED ORAL at 06:28

## 2022-11-28 RX ADMIN — SODIUM CHLORIDE 50 MILLILITER(S): 9 INJECTION, SOLUTION INTRAVENOUS at 12:04

## 2022-11-28 RX ADMIN — ISOSORBIDE MONONITRATE 60 MILLIGRAM(S): 60 TABLET, EXTENDED RELEASE ORAL at 12:02

## 2022-11-28 RX ADMIN — CARVEDILOL PHOSPHATE 12.5 MILLIGRAM(S): 80 CAPSULE, EXTENDED RELEASE ORAL at 17:29

## 2022-11-28 RX ADMIN — Medication 3 MILLIGRAM(S): at 22:01

## 2022-11-28 RX ADMIN — Medication 1 APPLICATION(S): at 12:00

## 2022-11-28 RX ADMIN — CLOPIDOGREL BISULFATE 75 MILLIGRAM(S): 75 TABLET, FILM COATED ORAL at 12:00

## 2022-11-28 RX ADMIN — Medication 0.25 MILLIGRAM(S): at 12:50

## 2022-11-28 RX ADMIN — MIRTAZAPINE 30 MILLIGRAM(S): 45 TABLET, ORALLY DISINTEGRATING ORAL at 22:01

## 2022-11-28 NOTE — CONSULT NOTE ADULT - SUBJECTIVE AND OBJECTIVE BOX
[facility] DEPARTMENT OF OPHTHALMOLOGY - INITIAL ADULT CONSULT  -----------------------------------------------------------------------------------------------------------  Trinity Trivedi MD PGY-3  -----------------------------------------------------------------------------------------------------------    HPI:  82F PMH CAD s/p 3vCABG, HTN, HLD, kidney stones, CKD, p/w 1.5 mo of difficulty with stooling and episode of BRBPR today and lightheadedness.   Pt has been struggling with trace blood with BMs for ~1.5mo & was seen 11/8 in the ED for same.   No prior colonoscopy/endoscopy for years, Family having trouble getting cardiac clearance for scopes outpatient    Denies CP, SOB, abm, N/V, fever, chills, palpitation  (24 Nov 2022 00:18)    Interval History: Ophthalmology consulted for intermittent blurry vision x2 months. Denies other changes in vision, eye pain, tearing, diplopia. Has tried artificial tears in the past with minimal relief. Also reports seeing glares.     PAST MEDICAL & SURGICAL HISTORY:  Hypertension      Kidney stones      Dyslipidemia      Depression      MRSA infection  nasal abscess      Coronary artery disease  s/p PCI with one stent to LAD 2009, CABG      Myocardial infarct      Cataracts, bilateral      Prediabetes      S/P coronary artery bypass graft x 3  RCA, OM &amp; LAD bypassed (SVG x 2, LIMA x 1)        Past Ocular History: denies surg/laser  Ophthalmic Medications: artificial tears  FAMILY HISTORY:   no glc/amd  Social History: no etoh/tobacco    MEDICATIONS  (STANDING):  atorvastatin 80 milliGRAM(s) Oral at bedtime  bacitracin   Ointment 1 Application(s) Topical two times a day  budesonide  80 MICROgram(s)/formoterol 4.5 MICROgram(s) Inhaler 2 Puff(s) Inhalation two times a day  carvedilol 12.5 milliGRAM(s) Oral every 12 hours  clopidogrel Tablet 75 milliGRAM(s) Oral daily  dapagliflozin 5 milliGRAM(s) Oral daily  influenza  Vaccine (HIGH DOSE) 0.7 milliLiter(s) IntraMuscular once  isosorbide   mononitrate ER Tablet (IMDUR) 60 milliGRAM(s) Oral daily  lactated ringers. 500 milliLiter(s) (50 mL/Hr) IV Continuous <Continuous>  LORazepam     Tablet 0.5 milliGRAM(s) Oral once  mirtazapine 30 milliGRAM(s) Oral at bedtime  pantoprazole  Injectable 40 milliGRAM(s) IV Push every 12 hours  ranolazine 500 milliGRAM(s) Oral two times a day  spironolactone 25 milliGRAM(s) Oral daily    MEDICATIONS  (PRN):  acetaminophen     Tablet .. 650 milliGRAM(s) Oral every 6 hours PRN Temp greater or equal to 38C (100.4F), Mild Pain (1 - 3)  aluminum hydroxide/magnesium hydroxide/simethicone Suspension 30 milliLiter(s) Oral every 4 hours PRN Dyspepsia  artificial  tears Solution 1 Drop(s) Both EYES every 4 hours PRN dry eye  LORazepam     Tablet 0.25 milliGRAM(s) Oral two times a day PRN Anxiety  melatonin 3 milliGRAM(s) Oral at bedtime PRN Insomnia  ondansetron Injectable 4 milliGRAM(s) IV Push every 8 hours PRN Nausea and/or Vomiting    Allergies & Intolerances:   aspirin (Other; Swelling)  latex (Pruritus; Rash)    Review of Systems:  Constitutional: No fever, chills  Eyes: +blurry vision. Denies flashes, floaters, FBS, erythema, discharge, double vision, OU  Neuro: No tremors  Cardiovascular: No chest pain, palpitations  Respiratory: No SOB, no cough  GI: No nausea, vomiting, abdominal pain  : No dysuria  Skin: no rash  Psych: no depression  Endocrine: no polyuria, polydipsia  Heme/lymph: no swelling    VITALS: T(C): 36.4 (11-28-22 @ 11:56)  T(F): 97.6 (11-28-22 @ 11:56), Max: 98.9 (11-27-22 @ 22:11)  HR: 62 (11-28-22 @ 17:19) (57 - 64)  BP: 121/71 (11-28-22 @ 17:19) (110/65 - 133/63)  RR:  (18 - 18)  SpO2:  (97% - 99%)  Wt(kg): --  General: AAO x 3, appropriate mood and affect    Ophthalmology Exam:  Visual acuity (cc): 20/25 OD, 20/20 OS  Pupils: PERRL OU, no APD  Ttono: 18, 15  Extraocular movements (EOMs): Full OU, no pain, no diplopia  Confrontational Visual Field (CVF): Full OU  Color Plates: 12/12 OU    Pen Light Exam (PLE)  External: Flat OU  Lids/Lashes/Lacrimal Ducts: Flat OU    Sclera/Conjunctiva: W+Q OU  Cornea: trace spk OU  Anterior Chamber: D+F OU    Iris: Flat OU  Lens: Cl OU    Fundus Exam: dilated with 1% tropicamide and 2.5% phenylephrine  Approval obtained from primary team for dilation  Patient aware that pupils can remained dilated for at least 4-6 hours  Exam performed with 20D lens    Vitreous: wnl OU  Disc, cup/disc: sharp and pink, 0.4 OU  Macula: wnl OU  Vessels: wnl OU  Periphery: wnl OU    Labs/Imaging:  ***   [facility] DEPARTMENT OF OPHTHALMOLOGY - INITIAL ADULT CONSULT  -----------------------------------------------------------------------------------------------------------  Trinity Trivedi MD PGY-3  -----------------------------------------------------------------------------------------------------------    HPI:  82F PMH CAD s/p 3vCABG, HTN, HLD, kidney stones, CKD, p/w 1.5 mo of difficulty with stooling and episode of BRBPR today and lightheadedness.   Pt has been struggling with trace blood with BMs for ~1.5mo & was seen 11/8 in the ED for same.   No prior colonoscopy/endoscopy for years, Family having trouble getting cardiac clearance for scopes outpatient    Denies CP, SOB, abm, N/V, fever, chills, palpitation  (24 Nov 2022 00:18)    Interval History: Ophthalmology consulted for intermittent blurry vision x2 months. Denies other changes in vision, eye pain, tearing, diplopia. Has tried artificial tears in the past with minimal relief. Also reports seeing glares.     PAST MEDICAL & SURGICAL HISTORY:  Hypertension      Kidney stones      Dyslipidemia      Depression      MRSA infection  nasal abscess      Coronary artery disease  s/p PCI with one stent to LAD 2009, CABG      Myocardial infarct      Cataracts, bilateral      Prediabetes      S/P coronary artery bypass graft x 3  RCA, OM &amp; LAD bypassed (SVG x 2, LIMA x 1)        Past Ocular History: denies surg/laser  Ophthalmic Medications: artificial tears  FAMILY HISTORY:   no glc/amd  Social History: no etoh/tobacco    MEDICATIONS  (STANDING):  atorvastatin 80 milliGRAM(s) Oral at bedtime  bacitracin   Ointment 1 Application(s) Topical two times a day  budesonide  80 MICROgram(s)/formoterol 4.5 MICROgram(s) Inhaler 2 Puff(s) Inhalation two times a day  carvedilol 12.5 milliGRAM(s) Oral every 12 hours  clopidogrel Tablet 75 milliGRAM(s) Oral daily  dapagliflozin 5 milliGRAM(s) Oral daily  influenza  Vaccine (HIGH DOSE) 0.7 milliLiter(s) IntraMuscular once  isosorbide   mononitrate ER Tablet (IMDUR) 60 milliGRAM(s) Oral daily  lactated ringers. 500 milliLiter(s) (50 mL/Hr) IV Continuous <Continuous>  LORazepam     Tablet 0.5 milliGRAM(s) Oral once  mirtazapine 30 milliGRAM(s) Oral at bedtime  pantoprazole  Injectable 40 milliGRAM(s) IV Push every 12 hours  ranolazine 500 milliGRAM(s) Oral two times a day  spironolactone 25 milliGRAM(s) Oral daily    MEDICATIONS  (PRN):  acetaminophen     Tablet .. 650 milliGRAM(s) Oral every 6 hours PRN Temp greater or equal to 38C (100.4F), Mild Pain (1 - 3)  aluminum hydroxide/magnesium hydroxide/simethicone Suspension 30 milliLiter(s) Oral every 4 hours PRN Dyspepsia  artificial  tears Solution 1 Drop(s) Both EYES every 4 hours PRN dry eye  LORazepam     Tablet 0.25 milliGRAM(s) Oral two times a day PRN Anxiety  melatonin 3 milliGRAM(s) Oral at bedtime PRN Insomnia  ondansetron Injectable 4 milliGRAM(s) IV Push every 8 hours PRN Nausea and/or Vomiting    Allergies & Intolerances:   aspirin (Other; Swelling)  latex (Pruritus; Rash)    Review of Systems:  Constitutional: No fever, chills  Eyes: +blurry vision. Denies flashes, floaters, FBS, erythema, discharge, double vision, OU  Neuro: No tremors  Cardiovascular: No chest pain, palpitations  Respiratory: No SOB, no cough  GI: No nausea, vomiting, abdominal pain  : No dysuria  Skin: no rash  Psych: no depression  Endocrine: no polyuria, polydipsia  Heme/lymph: no swelling    VITALS: T(C): 36.4 (11-28-22 @ 11:56)  T(F): 97.6 (11-28-22 @ 11:56), Max: 98.9 (11-27-22 @ 22:11)  HR: 62 (11-28-22 @ 17:19) (57 - 64)  BP: 121/71 (11-28-22 @ 17:19) (110/65 - 133/63)  RR:  (18 - 18)  SpO2:  (97% - 99%)  Wt(kg): --  General: AAO x 3, appropriate mood and affect    Ophthalmology Exam:  Visual acuity (cc): 20/25 OD, 20/20 OS  Pupils: PERRL OU, no APD  Ttono: 18, 15  Extraocular movements (EOMs): Full OU, no pain, no diplopia  Confrontational Visual Field (CVF): Full OU  Color Plates: 12/12 OU    Pen Light Exam (PLE)  External: Flat OU  Lids/Lashes/Lacrimal Ducts: Flat OU    Sclera/Conjunctiva: W+Q OU  Cornea: trace spk OU  Anterior Chamber: D+F OU    Iris: Flat OU  Lens: Cl OU    Fundus Exam: dilated with 1% tropicamide and 2.5% phenylephrine  Approval obtained from primary team for dilation  Patient aware that pupils can remained dilated for at least 4-6 hours  Exam performed with 20D lens    Vitreous: wnl OU  Disc, cup/disc: sharp and pink, 0.4 OU  Macula: wnl OU  Vessels: wnl OU  Periphery: wnl OU    Labs/Imaging:  ***   [facility] DEPARTMENT OF OPHTHALMOLOGY - INITIAL ADULT CONSULT  -----------------------------------------------------------------------------------------------------------  Trinity Trivedi MD PGY-3  -----------------------------------------------------------------------------------------------------------    HPI:  82F PMH CAD s/p 3vCABG, HTN, HLD, kidney stones, CKD, p/w 1.5 mo of difficulty with stooling and episode of BRBPR today and lightheadedness.   Pt has been struggling with trace blood with BMs for ~1.5mo & was seen 11/8 in the ED for same.   No prior colonoscopy/endoscopy for years, Family having trouble getting cardiac clearance for scopes outpatient    Denies CP, SOB, abm, N/V, fever, chills, palpitation  (24 Nov 2022 00:18)    Interval History: Ophthalmology consulted for intermittent blurry vision x2 months. Denies other changes in vision, eye pain, tearing, diplopia. Has tried artificial tears in the past with minimal relief. Also reports seeing glares.     PAST MEDICAL & SURGICAL HISTORY:  Hypertension      Kidney stones      Dyslipidemia      Depression      MRSA infection  nasal abscess      Coronary artery disease  s/p PCI with one stent to LAD 2009, CABG      Myocardial infarct      Cataracts, bilateral      Prediabetes      S/P coronary artery bypass graft x 3  RCA, OM &amp; LAD bypassed (SVG x 2, LIMA x 1)        Past Ocular History: denies surg/laser  Ophthalmic Medications: artificial tears  FAMILY HISTORY:   no glc/amd  Social History: no etoh/tobacco    MEDICATIONS  (STANDING):  atorvastatin 80 milliGRAM(s) Oral at bedtime  bacitracin   Ointment 1 Application(s) Topical two times a day  budesonide  80 MICROgram(s)/formoterol 4.5 MICROgram(s) Inhaler 2 Puff(s) Inhalation two times a day  carvedilol 12.5 milliGRAM(s) Oral every 12 hours  clopidogrel Tablet 75 milliGRAM(s) Oral daily  dapagliflozin 5 milliGRAM(s) Oral daily  influenza  Vaccine (HIGH DOSE) 0.7 milliLiter(s) IntraMuscular once  isosorbide   mononitrate ER Tablet (IMDUR) 60 milliGRAM(s) Oral daily  lactated ringers. 500 milliLiter(s) (50 mL/Hr) IV Continuous <Continuous>  LORazepam     Tablet 0.5 milliGRAM(s) Oral once  mirtazapine 30 milliGRAM(s) Oral at bedtime  pantoprazole  Injectable 40 milliGRAM(s) IV Push every 12 hours  ranolazine 500 milliGRAM(s) Oral two times a day  spironolactone 25 milliGRAM(s) Oral daily    MEDICATIONS  (PRN):  acetaminophen     Tablet .. 650 milliGRAM(s) Oral every 6 hours PRN Temp greater or equal to 38C (100.4F), Mild Pain (1 - 3)  aluminum hydroxide/magnesium hydroxide/simethicone Suspension 30 milliLiter(s) Oral every 4 hours PRN Dyspepsia  artificial  tears Solution 1 Drop(s) Both EYES every 4 hours PRN dry eye  LORazepam     Tablet 0.25 milliGRAM(s) Oral two times a day PRN Anxiety  melatonin 3 milliGRAM(s) Oral at bedtime PRN Insomnia  ondansetron Injectable 4 milliGRAM(s) IV Push every 8 hours PRN Nausea and/or Vomiting    Allergies & Intolerances:   aspirin (Other; Swelling)  latex (Pruritus; Rash)    Review of Systems:  Constitutional: No fever, chills  Eyes: +blurry vision. Denies flashes, floaters, FBS, erythema, discharge, double vision, OU  Neuro: No tremors  Cardiovascular: No chest pain, palpitations  Respiratory: No SOB, no cough  GI: No nausea, vomiting, abdominal pain  : No dysuria  Skin: no rash  Psych: no depression  Endocrine: no polyuria, polydipsia  Heme/lymph: no swelling    VITALS: T(C): 36.4 (11-28-22 @ 11:56)  T(F): 97.6 (11-28-22 @ 11:56), Max: 98.9 (11-27-22 @ 22:11)  HR: 62 (11-28-22 @ 17:19) (57 - 64)  BP: 121/71 (11-28-22 @ 17:19) (110/65 - 133/63)  RR:  (18 - 18)  SpO2:  (97% - 99%)  Wt(kg): --  General: AAO x 3, appropriate mood and affect    Ophthalmology Exam:  Visual acuity (cc): 20/25 OD, 20/20 OS  Pupils: PERRL OU, no APD  Ttono: 18, 15  Extraocular movements (EOMs): Full OU, no pain, no diplopia  Confrontational Visual Field (CVF): Full OU  Color Plates: 12/12 OU    Pen Light Exam (PLE)  External: Flat OU  Lids/Lashes/Lacrimal Ducts: Flat OU    Sclera/Conjunctiva: W+Q OU  Cornea: trace spk OU  Anterior Chamber: D+F OU    Iris: Flat OU  Lens: Cl OU    Fundus Exam: dilated with 1% tropicamide and 2.5% phenylephrine  Approval obtained from primary team for dilation  Patient aware that pupils can remained dilated for at least 4-6 hours  Exam performed with 20D lens    Vitreous: wnl OU  Disc, cup/disc: sharp and pink, 0.4 OU  Macula: wnl OU  Vessels: wnl OU  Periphery: wnl OU    Labs/Imaging:  ***   [facility] DEPARTMENT OF OPHTHALMOLOGY - INITIAL ADULT CONSULT  -----------------------------------------------------------------------------------------------------------  Trinity Trivedi MD PGY-3  -----------------------------------------------------------------------------------------------------------    HPI:  82F PMH CAD s/p 3vCABG, HTN, HLD, kidney stones, CKD, p/w 1.5 mo of difficulty with stooling and episode of BRBPR today and lightheadedness.   Pt has been struggling with trace blood with BMs for ~1.5mo & was seen 11/8 in the ED for same.   No prior colonoscopy/endoscopy for years, Family having trouble getting cardiac clearance for scopes outpatient    Denies CP, SOB, abm, N/V, fever, chills, palpitation  (24 Nov 2022 00:18)    Interval History: Ophthalmology consulted for intermittent blurry vision OU x2 months. Denies other changes in vision, eye pain, tearing, diplopia. Has tried eyedrops in the past with minimal relief. Also reports seeing glares. Son at bedside for collateral. Does not remember name of ophthalmologist but pt is using PF Restasis and loteprednol BID for RITESH. Had complicated cataract surgery OS 2019 which needed laser and surgical revision (unclear procedure).     PAST MEDICAL & SURGICAL HISTORY:  Hypertension      Kidney stones      Dyslipidemia      Depression      MRSA infection  nasal abscess      Coronary artery disease  s/p PCI with one stent to LAD 2009, CABG      Myocardial infarct      Cataracts, bilateral      Prediabetes      S/P coronary artery bypass graft x 3  RCA, OM &amp; LAD bypassed (SVG x 2, LIMA x 1)        Past Ocular History: CE/IOL OS, RITESH   Ophthalmic Medications: cyclosporine and loteprednol gtts BID  FAMILY HISTORY:   no glc/amd  Social History: no etoh/tobacco    MEDICATIONS  (STANDING):  atorvastatin 80 milliGRAM(s) Oral at bedtime  bacitracin   Ointment 1 Application(s) Topical two times a day  budesonide  80 MICROgram(s)/formoterol 4.5 MICROgram(s) Inhaler 2 Puff(s) Inhalation two times a day  carvedilol 12.5 milliGRAM(s) Oral every 12 hours  clopidogrel Tablet 75 milliGRAM(s) Oral daily  dapagliflozin 5 milliGRAM(s) Oral daily  influenza  Vaccine (HIGH DOSE) 0.7 milliLiter(s) IntraMuscular once  isosorbide   mononitrate ER Tablet (IMDUR) 60 milliGRAM(s) Oral daily  lactated ringers. 500 milliLiter(s) (50 mL/Hr) IV Continuous <Continuous>  LORazepam     Tablet 0.5 milliGRAM(s) Oral once  mirtazapine 30 milliGRAM(s) Oral at bedtime  pantoprazole  Injectable 40 milliGRAM(s) IV Push every 12 hours  ranolazine 500 milliGRAM(s) Oral two times a day  spironolactone 25 milliGRAM(s) Oral daily    MEDICATIONS  (PRN):  acetaminophen     Tablet .. 650 milliGRAM(s) Oral every 6 hours PRN Temp greater or equal to 38C (100.4F), Mild Pain (1 - 3)  aluminum hydroxide/magnesium hydroxide/simethicone Suspension 30 milliLiter(s) Oral every 4 hours PRN Dyspepsia  artificial  tears Solution 1 Drop(s) Both EYES every 4 hours PRN dry eye  LORazepam     Tablet 0.25 milliGRAM(s) Oral two times a day PRN Anxiety  melatonin 3 milliGRAM(s) Oral at bedtime PRN Insomnia  ondansetron Injectable 4 milliGRAM(s) IV Push every 8 hours PRN Nausea and/or Vomiting    Allergies & Intolerances:   aspirin (Other; Swelling)  latex (Pruritus; Rash)    Review of Systems:  Constitutional: No fever, chills  Eyes: +blurry vision. Denies flashes, floaters, FBS, erythema, discharge, double vision, OU  Neuro: No tremors  Cardiovascular: No chest pain, palpitations  Respiratory: No SOB, no cough  GI: No nausea, vomiting, abdominal pain  : No dysuria  Skin: no rash  Psych: no depression  Endocrine: no polyuria, polydipsia  Heme/lymph: no swelling    VITALS: T(C): 36.4 (11-28-22 @ 11:56)  T(F): 97.6 (11-28-22 @ 11:56), Max: 98.9 (11-27-22 @ 22:11)  HR: 62 (11-28-22 @ 17:19) (57 - 64)  BP: 121/71 (11-28-22 @ 17:19) (110/65 - 133/63)  RR:  (18 - 18)  SpO2:  (97% - 99%)  Wt(kg): --  General: AAO x 3, appropriate mood and affect    Ophthalmology Exam:  Visual acuity (cc): 20/25 OD, 20/20 OS  Pupils: PERRL OU, no APD  Ttono: 18, 15  Extraocular movements (EOMs): Full OU, no pain, no diplopia  Confrontational Visual Field (CVF): Full OU  Color Plates: 12/12 OU    pSLE   External: Flat OU  Lids/Lashes/Lacrimal Ducts: Flat OU    Sclera/Conjunctiva: W+Q OU  Cornea: trace spk OU  Anterior Chamber: D+F OU    Iris: Flat OU  Lens: NS OD, IOL OS    Fundus Exam: dilated with 1% tropicamide and 2.5% phenylephrine  Approval obtained from primary team for dilation  Patient aware that pupils can remained dilated for at least 4-6 hours  Exam performed with 20D lens    Vitreous: wnl OU  Disc, cup/disc: sharp and pink, 0.4 OU  Macula: wnl OU  Vessels: wnl OU  Periphery: wnl OU     [facility] DEPARTMENT OF OPHTHALMOLOGY - INITIAL ADULT CONSULT  -----------------------------------------------------------------------------------------------------------  Trinity Trivedi MD PGY-3  -----------------------------------------------------------------------------------------------------------    HPI:  82F PMH CAD s/p 3vCABG, HTN, HLD, kidney stones, CKD, p/w 1.5 mo of difficulty with stooling and episode of BRBPR today and lightheadedness.   Pt has been struggling with trace blood with BMs for ~1.5mo & was seen 11/8 in the ED for same.   No prior colonoscopy/endoscopy for years, Family having trouble getting cardiac clearance for scopes outpatient    Denies CP, SOB, abm, N/V, fever, chills, palpitation  (24 Nov 2022 00:18)    Interval History: Ophthalmology consulted for intermittent blurry vision OU x2 months. Denies other changes in vision, eye pain, tearing, diplopia. Has tried eyedrops in the past with minimal relief. Also reports seeing glares. Son at bedside for collateral. Does not remember name of ophthalmologist but pt is using PF Restasis and loteprednol BID for RITESH. Had complicated cataract surgery OS 2019 which needed laser and surgical revision (unclear procedure).     PAST MEDICAL & SURGICAL HISTORY:  Hypertension      Kidney stones      Dyslipidemia      Depression      MRSA infection  nasal abscess      Coronary artery disease  s/p PCI with one stent to LAD 2009, CABG      Myocardial infarct      Cataracts, bilateral      Prediabetes      S/P coronary artery bypass graft x 3  RCA, OM &amp; LAD bypassed (SVG x 2, LIMA x 1)        Past Ocular History: CE/IOL OS, RITESH   Ophthalmic Medications: cyclosporine and loteprednol gtts BID  FAMILY HISTORY:   no glc/amd  Social History: no etoh/tobacco    MEDICATIONS  (STANDING):  atorvastatin 80 milliGRAM(s) Oral at bedtime  bacitracin   Ointment 1 Application(s) Topical two times a day  budesonide  80 MICROgram(s)/formoterol 4.5 MICROgram(s) Inhaler 2 Puff(s) Inhalation two times a day  carvedilol 12.5 milliGRAM(s) Oral every 12 hours  clopidogrel Tablet 75 milliGRAM(s) Oral daily  dapagliflozin 5 milliGRAM(s) Oral daily  influenza  Vaccine (HIGH DOSE) 0.7 milliLiter(s) IntraMuscular once  isosorbide   mononitrate ER Tablet (IMDUR) 60 milliGRAM(s) Oral daily  lactated ringers. 500 milliLiter(s) (50 mL/Hr) IV Continuous <Continuous>  LORazepam     Tablet 0.5 milliGRAM(s) Oral once  mirtazapine 30 milliGRAM(s) Oral at bedtime  pantoprazole  Injectable 40 milliGRAM(s) IV Push every 12 hours  ranolazine 500 milliGRAM(s) Oral two times a day  spironolactone 25 milliGRAM(s) Oral daily    MEDICATIONS  (PRN):  acetaminophen     Tablet .. 650 milliGRAM(s) Oral every 6 hours PRN Temp greater or equal to 38C (100.4F), Mild Pain (1 - 3)  aluminum hydroxide/magnesium hydroxide/simethicone Suspension 30 milliLiter(s) Oral every 4 hours PRN Dyspepsia  artificial  tears Solution 1 Drop(s) Both EYES every 4 hours PRN dry eye  LORazepam     Tablet 0.25 milliGRAM(s) Oral two times a day PRN Anxiety  melatonin 3 milliGRAM(s) Oral at bedtime PRN Insomnia  ondansetron Injectable 4 milliGRAM(s) IV Push every 8 hours PRN Nausea and/or Vomiting    Allergies & Intolerances:   aspirin (Other; Swelling)  latex (Pruritus; Rash)    Review of Systems:  Constitutional: No fever, chills  Eyes: +blurry vision. Denies flashes, floaters, FBS, erythema, discharge, double vision, OU  Neuro: No tremors  Cardiovascular: No chest pain, palpitations  Respiratory: No SOB, no cough  GI: No nausea, vomiting, abdominal pain  : No dysuria  Skin: no rash  Psych: no depression  Endocrine: no polyuria, polydipsia  Heme/lymph: no swelling    VITALS: T(C): 36.4 (11-28-22 @ 11:56)  T(F): 97.6 (11-28-22 @ 11:56), Max: 98.9 (11-27-22 @ 22:11)  HR: 62 (11-28-22 @ 17:19) (57 - 64)  BP: 121/71 (11-28-22 @ 17:19) (110/65 - 133/63)  RR:  (18 - 18)  SpO2:  (97% - 99%)  Wt(kg): --  General: AAO x 3, appropriate mood and affect    Ophthalmology Exam:  Visual acuity (cc): 20/25 OD, 20/20 OS  Pupils: PERRL OU, no APD  Ttono: 18, 15  Extraocular movements (EOMs): Full OU, no pain, no diplopia  Confrontational Visual Field (CVF): Full OU  Color Plates: 12/12 OU    pSLE   External: Flat OU  Lids/Lashes/Lacrimal Ducts: Flat OU    Sclera/Conjunctiva: W+Q OU  Cornea: trace spk OU  Anterior Chamber: D+F OU    Iris: Flat OU  Lens: NS OD, IOL OS    Fundus Exam: dilated with 1% tropicamide and 2.5% phenylephrine  Approval obtained from primary team for dilation  Patient aware that pupils can remained dilated for at least 4-6 hours  Exam performed with 20D lens    Vitreous: wnl OU  Disc, cup/disc: sharp and pink, 0.4 OU  Macula: wnl OU  Vessels: wnl OU  Periphery: wnl OU     [facility] DEPARTMENT OF OPHTHALMOLOGY - INITIAL ADULT CONSULT  -----------------------------------------------------------------------------------------------------------  Trinity Trivedi MD PGY-3  -----------------------------------------------------------------------------------------------------------    HPI:  82F PMH CAD s/p 3vCABG, HTN, HLD, kidney stones, CKD, p/w 1.5 mo of difficulty with stooling and episode of BRBPR today and lightheadedness.   Pt has been struggling with trace blood with BMs for ~1.5mo & was seen 11/8 in the ED for same.   No prior colonoscopy/endoscopy for years, Family having trouble getting cardiac clearance for scopes outpatient    Denies CP, SOB, abm, N/V, fever, chills, palpitation  (24 Nov 2022 00:18)    Interval History: Ophthalmology consulted for intermittent blurry vision OU x2 months. Denies other changes in vision, eye pain, tearing, diplopia. Has tried eyedrops in the past with minimal relief. Also reports seeing glares. Son at bedside for collateral. Does not remember name of ophthalmologist but pt is using PF Restasis and loteprednol BID for RITESH. Had complicated cataract surgery OS 2019 which needed laser and surgical revision (unclear procedure).     PAST MEDICAL & SURGICAL HISTORY:  Hypertension      Kidney stones      Dyslipidemia      Depression      MRSA infection  nasal abscess      Coronary artery disease  s/p PCI with one stent to LAD 2009, CABG      Myocardial infarct      Cataracts, bilateral      Prediabetes      S/P coronary artery bypass graft x 3  RCA, OM &amp; LAD bypassed (SVG x 2, LIMA x 1)        Past Ocular History: CE/IOL OS, RITESH   Ophthalmic Medications: cyclosporine and loteprednol gtts BID  FAMILY HISTORY:   no glc/amd  Social History: no etoh/tobacco    MEDICATIONS  (STANDING):  atorvastatin 80 milliGRAM(s) Oral at bedtime  bacitracin   Ointment 1 Application(s) Topical two times a day  budesonide  80 MICROgram(s)/formoterol 4.5 MICROgram(s) Inhaler 2 Puff(s) Inhalation two times a day  carvedilol 12.5 milliGRAM(s) Oral every 12 hours  clopidogrel Tablet 75 milliGRAM(s) Oral daily  dapagliflozin 5 milliGRAM(s) Oral daily  influenza  Vaccine (HIGH DOSE) 0.7 milliLiter(s) IntraMuscular once  isosorbide   mononitrate ER Tablet (IMDUR) 60 milliGRAM(s) Oral daily  lactated ringers. 500 milliLiter(s) (50 mL/Hr) IV Continuous <Continuous>  LORazepam     Tablet 0.5 milliGRAM(s) Oral once  mirtazapine 30 milliGRAM(s) Oral at bedtime  pantoprazole  Injectable 40 milliGRAM(s) IV Push every 12 hours  ranolazine 500 milliGRAM(s) Oral two times a day  spironolactone 25 milliGRAM(s) Oral daily    MEDICATIONS  (PRN):  acetaminophen     Tablet .. 650 milliGRAM(s) Oral every 6 hours PRN Temp greater or equal to 38C (100.4F), Mild Pain (1 - 3)  aluminum hydroxide/magnesium hydroxide/simethicone Suspension 30 milliLiter(s) Oral every 4 hours PRN Dyspepsia  artificial  tears Solution 1 Drop(s) Both EYES every 4 hours PRN dry eye  LORazepam     Tablet 0.25 milliGRAM(s) Oral two times a day PRN Anxiety  melatonin 3 milliGRAM(s) Oral at bedtime PRN Insomnia  ondansetron Injectable 4 milliGRAM(s) IV Push every 8 hours PRN Nausea and/or Vomiting    Allergies & Intolerances:   aspirin (Other; Swelling)  latex (Pruritus; Rash)    Review of Systems:  Constitutional: No fever, chills  Eyes: +blurry vision. Denies flashes, floaters, FBS, erythema, discharge, double vision, OU  Neuro: No tremors  Cardiovascular: No chest pain, palpitations  Respiratory: No SOB, no cough  GI: No nausea, vomiting, abdominal pain  : No dysuria  Skin: no rash  Psych: no depression  Endocrine: no polyuria, polydipsia  Heme/lymph: no swelling    VITALS: T(C): 36.4 (11-28-22 @ 11:56)  T(F): 97.6 (11-28-22 @ 11:56), Max: 98.9 (11-27-22 @ 22:11)  HR: 62 (11-28-22 @ 17:19) (57 - 64)  BP: 121/71 (11-28-22 @ 17:19) (110/65 - 133/63)  RR:  (18 - 18)  SpO2:  (97% - 99%)  Wt(kg): --  General: AAO x 3, appropriate mood and affect    Ophthalmology Exam:  Visual acuity (cc): 20/25 OD, 20/20 OS  Pupils: PERRL OU, no APD  Ttono: 18, 15  Extraocular movements (EOMs): Full OU, no pain, no diplopia  Confrontational Visual Field (CVF): Full OU  Color Plates: 12/12 OU    pSLE   External: Flat OU  Lids/Lashes/Lacrimal Ducts: Flat OU    Sclera/Conjunctiva: W+Q OU  Cornea: trace spk OU  Anterior Chamber: D+F OU    Iris: Flat OU  Lens: NS OD, IOL OS    Fundus Exam: dilated with 1% tropicamide and 2.5% phenylephrine  Approval obtained from primary team for dilation  Patient aware that pupils can remained dilated for at least 4-6 hours  Exam performed with 20D lens    Vitreous: wnl OU  Disc, cup/disc: sharp and pink, 0.4 OU  Macula: wnl OU  Vessels: wnl OU  Periphery: wnl OU

## 2022-11-28 NOTE — PROGRESS NOTE ADULT - SUBJECTIVE AND OBJECTIVE BOX
Patient is a 82y old  Female who presents with a chief complaint of BRBPR (26 Nov 2022 17:50)      SUBJECTIVE / OVERNIGHT EVENTS: Pt continues to report occasional lightheadedness and blurry vision; Orthostatic positive  ROS: otherwise negative    MEDICATIONS  (STANDING):  amLODIPine   Tablet 5 milliGRAM(s) Oral daily  atorvastatin 80 milliGRAM(s) Oral at bedtime  bacitracin   Ointment 1 Application(s) Topical two times a day  budesonide  80 MICROgram(s)/formoterol 4.5 MICROgram(s) Inhaler 2 Puff(s) Inhalation two times a day  carvedilol 25 milliGRAM(s) Oral every 12 hours  clopidogrel Tablet 75 milliGRAM(s) Oral daily  dapagliflozin 5 milliGRAM(s) Oral daily  influenza  Vaccine (HIGH DOSE) 0.7 milliLiter(s) IntraMuscular once  isosorbide   mononitrate ER Tablet (IMDUR) 60 milliGRAM(s) Oral daily  mirtazapine 30 milliGRAM(s) Oral at bedtime  pantoprazole  Injectable 40 milliGRAM(s) IV Push every 12 hours  ranolazine 500 milliGRAM(s) Oral two times a day  spironolactone 25 milliGRAM(s) Oral daily    MEDICATIONS  (PRN):  acetaminophen     Tablet .. 650 milliGRAM(s) Oral every 6 hours PRN Temp greater or equal to 38C (100.4F), Mild Pain (1 - 3)  aluminum hydroxide/magnesium hydroxide/simethicone Suspension 30 milliLiter(s) Oral every 4 hours PRN Dyspepsia  artificial  tears Solution 1 Drop(s) Both EYES every 4 hours PRN dry eye  melatonin 3 milliGRAM(s) Oral at bedtime PRN Insomnia  ondansetron Injectable 4 milliGRAM(s) IV Push every 8 hours PRN Nausea and/or Vomiting      PHYSICAL EXAM:  T(C): 36.4 (11-28-22 @ 11:56), Max: 37.2 (11-27-22 @ 22:11)  T(F): 97.6 (11-28-22 @ 11:56), Max: 98.9 (11-27-22 @ 22:11)  HR: 64 (11-28-22 @ 11:56) (57 - 64)  BP: 133/63 (11-28-22 @ 11:56) (103/62 - 133/63)  RR: 18 (11-28-22 @ 11:56) (18 - 18)  SpO2: 99% (11-28-22 @ 11:56) (97% - 99%)  Wt(kg): --    CONSTITUTIONAL: NAD, well-developed, well-groomed  EYES: PERRLA; conjunctiva and sclera clear  ENMT: Moist oral mucosa, no pharyngeal injection or exudates  NECK: Supple, no palpable masses; no thyromegaly  RESPIRATORY: Normal respiratory effort; lungs are clear to auscultation bilaterally  CARDIOVASCULAR: Regular rate and rhythm, normal S1 and S2, + murmur, No lower extremity edema  ABDOMEN: Nontender to palpation, normoactive bowel sounds, no rebound/guarding; No hepatosplenomegaly  MUSCULOSKELETAL:  no clubbing or cyanosis of digits; no joint swelling or tenderness to palpation  PSYCH: A+O to person, place, and time; affect appropriate  NEUROLOGY: CN 2-12 are intact and symmetric; no gross sensory deficits   SKIN: No rashes; no palpable lesions      LABS: reviewed                                                     CAPILLARY BLOOD GLUCOSE      POCT Blood Glucose.: 94 mg/dL (27 Nov 2022 12:23)

## 2022-11-28 NOTE — CONSULT NOTE ADULT - ASSESSMENT
Assessment and Recommendations:  82y female w/ pmhx/ochx of *** consulted for ***, found to have ***          Outpatient follow-up: Patient should follow-up with his/her ophthalmologist or with [facility] Department of Ophthalmology at the address below     [address] [place] [address]. [address]  [place] [address]  [phone] Assessment and Recommendations:  82y female w/ pmhx/ochx of *** consulted for ***, found to have ***          Outpatient follow-up: Patient should follow-up with his/her ophthalmologist or with [facility] Department of Ophthalmology at the address below     [address] [place] [address]. [address]  [place] [address]  [phone] Assessment and Recommendations:  82y female w/ pmhx/ochx of *** consulted for ***, found to have ***          Outpatient follow-up: Patient should follow-up with his/her ophthalmologist or with [facility] Department of Ophthalmology at the address below     [address] [place] [address]. [address]  [place] [address]  [phone] Assessment and Recommendations:  82y female w/ pmhx/ochx of CAD s/p 3vCABG, HTN, HLD, kidney stones, CKD, CE/IOL OS, RITESH p/w 1.5 mo of difficulty stooling BRBPR admitted for management of GIB. Ophthalmology  consulted for blurry vision OU.    # blurry vision both eyes  - likely combination of dry eye syndrome and cataracts in right eye  - change artificial tears to [id number] standing   - pt can resume cyclosporine and loteprednol gtts BID per her ophthalmologist upon discharge      Outpatient follow-up: Patient should follow-up with his/her ophthalmologist or with [facility] Department of Ophthalmology 1 week at the address below     [address] [place] [address]. [address]  [place] [address]  [phone] Assessment and Recommendations:  82y female w/ pmhx/ochx of CAD s/p 3vCABG, HTN, HLD, kidney stones, CKD, CE/IOL OS, RITESH p/w 1.5 mo of difficulty stooling BRBPR admitted for management of GIB. Ophthalmology  consulted for blurry vision OU.    # blurry vision both eyes  - likely combination of dry eye syndrome and cataracts in right eye  - change artificial tears to [id number] standing   - pt can resume cyclosporine and loteprednol gtts BID per her ophthalmologist upon discharge      Outpatient follow-up: Patient should follow-up with his/her ophthalmologist or with [facility] Department of Ophthalmology 1 week at the address below     [address] [place] [address]. [address]  [place] [address]  [phone] Assessment and Recommendations:  82y female w/ pmhx/ochx of CAD s/p 3vCABG, HTN, HLD, kidney stones, CKD, CE/IOL OS, RITESH p/w 1.5 mo of difficulty stooling BRBPR admitted for management of GIB. Ophthalmology  consulted for blurry vision OU.    # blurry vision both eyes  - likely combination of dry eye syndrome and cataracts in right eye  - change artificial tears to [id number] standing   - pt can resume cyclosporine and loteprednol gtts BID per her ophthalmologist upon discharge      Outpatient follow-up: Patient should follow-up with his/her ophthalmologist or with [facility] Department of Ophthalmology 1 week at the address below     [address] [place] [address]. [address]  [place] [address]  [phone] Assessment and Recommendations:  82y female w/ pmhx/ochx of CAD s/p 3vCABG, HTN, HLD, kidney stones, CKD, CE/IOL OS, RITESH p/w 1.5 mo of difficulty stooling BRBPR admitted for management of GIB. Ophthalmology  consulted for blurry vision OU.    # blurry vision both eyes  - likely combination of dry eye syndrome and cataracts in right eye  - change artificial tears to preservative free [id number] standing (ordered)  - pt can resume cyclosporine and loteprednol gtts BID per her ophthalmologist upon discharge      Outpatient follow-up: Patient should follow-up with his/her ophthalmologist or with [facility] Department of Ophthalmology 1 week at the address below     [address] [place] [address]. [address]  [place] [address]  [phone] Assessment and Recommendations:  82y female w/ pmhx/ochx of CAD s/p 3vCABG, HTN, HLD, kidney stones, CKD, CE/IOL OS, RITESH p/w 1.5 mo of difficulty stooling BRBPR admitted for management of GIB. Ophthalmology  consulted for blurry vision OU.    # blurry vision both eyes  - likely combination of dry eye syndrome and cataracts in right eye  - change artificial tears to preservative free [id number] standing (ordered)  - pt can resume cyclosporine and loteprednol gtts BID per her ophthalmologist upon discharge      Outpatient follow-up: Patient should follow-up with his/her ophthalmologist or with [facility] Department of Ophthalmology 1 week at the address below     [address] [place] [address]. [address]  [place] [address]  [phone] Assessment and Recommendations:  82y female w/ pmhx/ochx of CAD s/p 3vCABG, HTN, HLD, kidney stones, CKD, CE/IOL OS, RITESH p/w 1.5 mo of difficulty stooling BRBPR admitted for management of GIB. Ophthalmology  consulted for blurry vision OU.    # blurry vision both eyes  - likely combination of dry eye syndrome and cataracts in right eye  - change artificial tears to preservative free [id number] standing (ordered)  - pt can resume cyclosporine and loteprednol gtts BID per her ophthalmologist upon discharge      Outpatient follow-up: Patient should follow-up with his/her ophthalmologist or with [facility] Department of Ophthalmology 1 week at the address below     [address] [place] [address]. [address]  [place] [address]  [phone]

## 2022-11-28 NOTE — PROGRESS NOTE ADULT - SUBJECTIVE AND OBJECTIVE BOX
Patient is a 82y Female     Patient is a 82y old  Female who presents with a chief complaint of BRBPR (27 Nov 2022 15:07)      HPI:  82F PMH CAD s/p 3vCABG, HTN, HLD, kidney stones, CKD, p/w 1.5 mo of difficulty with stooling and episode of BRBPR today and lightheadedness.   Pt has been struggling with trace blood with BMs for ~1.5mo & was seen 11/8 in the ED for same.   No prior colonoscopy/endoscopy for years, Family having trouble getting cardiac clearance for scopes outpatient    Denies CP, SOB, abm, N/V, fever, chills, palpitation  (24 Nov 2022 00:18)      PAST MEDICAL & SURGICAL HISTORY:  Hypertension      Kidney stones      Dyslipidemia      Depression      MRSA infection  nasal abscess      Coronary artery disease  s/p PCI with one stent to LAD 2009, CABG      Myocardial infarct      Cataracts, bilateral      Prediabetes      S/P coronary artery bypass graft x 3  RCA, OM &amp; LAD bypassed (SVG x 2, LIMA x 1)          MEDICATIONS  (STANDING):  atorvastatin 80 milliGRAM(s) Oral at bedtime  bacitracin   Ointment 1 Application(s) Topical two times a day  budesonide  80 MICROgram(s)/formoterol 4.5 MICROgram(s) Inhaler 2 Puff(s) Inhalation two times a day  carvedilol 12.5 milliGRAM(s) Oral every 12 hours  clopidogrel Tablet 75 milliGRAM(s) Oral daily  dapagliflozin 5 milliGRAM(s) Oral daily  influenza  Vaccine (HIGH DOSE) 0.7 milliLiter(s) IntraMuscular once  isosorbide   mononitrate ER Tablet (IMDUR) 60 milliGRAM(s) Oral daily  lactated ringers. 500 milliLiter(s) (50 mL/Hr) IV Continuous <Continuous>  mirtazapine 30 milliGRAM(s) Oral at bedtime  pantoprazole  Injectable 40 milliGRAM(s) IV Push every 12 hours  ranolazine 500 milliGRAM(s) Oral two times a day  spironolactone 25 milliGRAM(s) Oral daily      Allergies    aspirin (Other; Swelling)  latex (Pruritus; Rash)    Intolerances        SOCIAL HISTORY:  Denies ETOh,Smoking,     FAMILY HISTORY:      REVIEW OF SYSTEMS:    CONSTITUTIONAL: No weakness, fevers or chills  EYES/ENT: No visual changes;  No vertigo or throat pain   NECK: No pain or stiffness  RESPIRATORY: No cough, wheezing, hemoptysis; No shortness of breath  CARDIOVASCULAR: No chest pain or palpitations  GASTROINTESTINAL: No abdominal or epigastric pain. No nausea, vomiting, or hematemesis; No diarrhea or constipation. No melena or hematochezia.  GENITOURINARY: No dysuria, frequency or hematuria  NEUROLOGICAL: No numbness or weakness  SKIN: No itching, burning, rashes, or lesions   All other review of systems is negative unless indicated above.    VITAL:  T(C): , Max: 37.2 (11-27-22 @ 22:11)  T(F): , Max: 98.9 (11-27-22 @ 22:11)  HR: 60 (11-28-22 @ 04:30)  BP: 132/74 (11-28-22 @ 04:30)  BP(mean): 74 (11-28-22 @ 04:30)  RR: 18 (11-28-22 @ 04:30)  SpO2: 98% (11-28-22 @ 04:30)  Wt(kg): --    I and O's:    11-26 @ 07:01  -  11-27 @ 07:00  --------------------------------------------------------  IN: 538 mL / OUT: 0 mL / NET: 538 mL    11-27 @ 07:01  -  11-28 @ 07:00  --------------------------------------------------------  IN: 900.8 mL / OUT: 0 mL / NET: 900.8 mL          PHYSICAL EXAM:    Constitutional: NAD  HEENT: PERRLA,   Neck: No JVD  Respiratory: CTA B/L  Cardiovascular: S1 and S2  Gastrointestinal: BS+, soft, NT/ND  Extremities: No peripheral edema  Neurological: A/O x 3, no focal deficits  Psychiatric: Normal mood, normal affect  : No Vines  Skin: No rashes  Access: Not applicable  Back: No CVA tenderness    LABS:                RADIOLOGY & ADDITIONAL STUDIES:

## 2022-11-28 NOTE — PROGRESS NOTE ADULT - NS ATTEND AMEND GEN_ALL_CORE FT
Patient care and plan discussed and reviewed with Advanced Care Provider. Plan as outlined above edited by me to reflect our discussion.

## 2022-11-28 NOTE — PROGRESS NOTE ADULT - SUBJECTIVE AND OBJECTIVE BOX
DATE OF SERVICE: 11-28-22 @ 13:14    Patient is a 82y old  Female who presents with a chief complaint of BRBPR (28 Nov 2022 09:06)      INTERVAL HISTORY: Feels ok. Now c/o blurred vision over the weekend and dizziness.    REVIEW OF SYSTEMS:  CONSTITUTIONAL: No weakness  EYES/ENT: No visual changes;  No throat pain   NECK: No pain or stiffness  RESPIRATORY: No cough, wheezing; No shortness of breath  CARDIOVASCULAR: No chest pain or palpitations  GASTROINTESTINAL: No abdominal  pain. No nausea, vomiting, or hematemesis  GENITOURINARY: No dysuria, frequency or hematuria  NEUROLOGICAL: No stroke like symptoms  SKIN: No rashes    TELEMETRY Personally reviewed: SR 50-60s  	  MEDICATIONS:  carvedilol 12.5 milliGRAM(s) Oral every 12 hours  isosorbide   mononitrate ER Tablet (IMDUR) 60 milliGRAM(s) Oral daily  ranolazine 500 milliGRAM(s) Oral two times a day  spironolactone 25 milliGRAM(s) Oral daily        PHYSICAL EXAM:  T(C): 36.4 (11-28-22 @ 11:56), Max: 37.2 (11-27-22 @ 22:11)  HR: 64 (11-28-22 @ 11:56) (57 - 64)  BP: 133/63 (11-28-22 @ 11:56) (103/62 - 133/63)  RR: 18 (11-28-22 @ 11:56) (18 - 18)  SpO2: 99% (11-28-22 @ 11:56) (97% - 99%)  Wt(kg): --  I&O's Summary    27 Nov 2022 07:01  -  28 Nov 2022 07:00  --------------------------------------------------------  IN: 900.8 mL / OUT: 0 mL / NET: 900.8 mL    28 Nov 2022 07:01  -  28 Nov 2022 13:14  --------------------------------------------------------  IN: 360 mL / OUT: 0 mL / NET: 360 mL          Appearance: In no distress	  HEENT:    PERRL, EOMI	  Cardiovascular:  S1 S2, No JVD  Respiratory: Lungs clear to auscultation	  Gastrointestinal:  Soft, Non-tender, + BS	  Vascularature:  No edema of LE  Psychiatric: Appropriate affect   Neuro: no acute focal deficits                     Labs personally reviewed      ASSESSMENT/PLAN: 	    82F PMH CAD s/p 3vCABG, HTN, HLD, kidney stones, CKD, p/w 1.5 mo of difficulty with stooling and episode of BRBPR today and lightheadedness.   Pt has been struggling with trace blood with BMs for ~1.5mo & was seen 11/8 in the ED for same. No prior colonoscopy/endoscopy for years, Family having trouble getting cardiac clearance for scopes outpatient. Followed by outpatient Cardiologist, Dr. Miguel Macdonald.     Problem/Plan -1  Problem: Heart Failure with preserved Ejection Fraction  - Prior TTE from 2019 with EF 63%, mild MR, + WMA, moderate diastolic dysfunction, normal RV size and function  - proBNP 11/7 529  - Patient appears to be on sHF GDMT. Will repeat TTE to assess for LVEF, WMA and valvular dysfunction  - For now will continue with sHF GDMT: Hydralazine 25 mg PO BID, coreg 12.5 mg PO BID    Problem/Plan -2  Problem:  Coronary Artery Disease  - ECG SR unchanged from previous ECG  - Hx of CAD with hx of remote CABG (in 1990s) and [place] [facility] [place]  - per OP cardio Dr Macdonald, cath about 4 years ago with diffuse disease not amendable to PCI  - Reports average functional capacity. Chronic stable angina  - c/w coreg 12.5mg PO BID (dose lowered d/t dizziness), atorvastatin 80mg PO daily (in place of rosuvastatin), imdur 60mg PO daily, ranexa 500mg PO BID  - Will repeat TTE to assess for WMA  -11/26: TTE done on 11/25, pending final read     Problem/Plan -3  Problem: HTN  - BP currently wnl.    Problem/Plan -4  Problem: HLD  - c/w statin  - Will resume Vascepa upon DC    Problem/Plan -5  Problem: Cardiac Risk Stratification  - Patient does not appear to be in decompensated HF  - No hx of tachy bridget arrhythmias  - No hx of severe AS/MS   - Patient is mod risk for mod risk colonoscopy. No contraindication to proceed   - Discussed with OP cardio Dr Zack Carlton at [facility]- who is in agreement     Problem/Plan -6  Problem: Orthostatic Hypotension  - Coreg dose reduced  - s/p IVF  - Encourage PO intake as tolerated  - Please implement compression stocking  - Ambulate as tolerated with assistance  - Repeat orthos daily      SHIN Mackey-CARLOS Younger DO [facility]  Cardiovascular Medicine  [address] [place] [address], [address]  Available through call or text on [employer] TE[employer]  Office: [phone]   DATE OF SERVICE: 11-28-22 @ 13:14    Patient is a 82y old  Female who presents with a chief complaint of BRBPR (28 Nov 2022 09:06)      INTERVAL HISTORY: Feels ok. Now c/o blurred vision over the weekend and dizziness.    REVIEW OF SYSTEMS:  CONSTITUTIONAL: No weakness  EYES/ENT: No visual changes;  No throat pain   NECK: No pain or stiffness  RESPIRATORY: No cough, wheezing; No shortness of breath  CARDIOVASCULAR: No chest pain or palpitations  GASTROINTESTINAL: No abdominal  pain. No nausea, vomiting, or hematemesis  GENITOURINARY: No dysuria, frequency or hematuria  NEUROLOGICAL: No stroke like symptoms  SKIN: No rashes    TELEMETRY Personally reviewed: SR 50-60s  	  MEDICATIONS:  carvedilol 12.5 milliGRAM(s) Oral every 12 hours  isosorbide   mononitrate ER Tablet (IMDUR) 60 milliGRAM(s) Oral daily  ranolazine 500 milliGRAM(s) Oral two times a day  spironolactone 25 milliGRAM(s) Oral daily        PHYSICAL EXAM:  T(C): 36.4 (11-28-22 @ 11:56), Max: 37.2 (11-27-22 @ 22:11)  HR: 64 (11-28-22 @ 11:56) (57 - 64)  BP: 133/63 (11-28-22 @ 11:56) (103/62 - 133/63)  RR: 18 (11-28-22 @ 11:56) (18 - 18)  SpO2: 99% (11-28-22 @ 11:56) (97% - 99%)  Wt(kg): --  I&O's Summary    27 Nov 2022 07:01  -  28 Nov 2022 07:00  --------------------------------------------------------  IN: 900.8 mL / OUT: 0 mL / NET: 900.8 mL    28 Nov 2022 07:01  -  28 Nov 2022 13:14  --------------------------------------------------------  IN: 360 mL / OUT: 0 mL / NET: 360 mL          Appearance: In no distress	  HEENT:    PERRL, EOMI	  Cardiovascular:  S1 S2, No JVD  Respiratory: Lungs clear to auscultation	  Gastrointestinal:  Soft, Non-tender, + BS	  Vascularature:  No edema of LE  Psychiatric: Appropriate affect   Neuro: no acute focal deficits                     Labs personally reviewed      ASSESSMENT/PLAN: 	    82F PMH CAD s/p 3vCABG, HTN, HLD, kidney stones, CKD, p/w 1.5 mo of difficulty with stooling and episode of BRBPR today and lightheadedness.   Pt has been struggling with trace blood with BMs for ~1.5mo & was seen 11/8 in the ED for same. No prior colonoscopy/endoscopy for years, Family having trouble getting cardiac clearance for scopes outpatient. Followed by outpatient Cardiologist, Dr. Miguel Macdonald.     Problem/Plan -1  Problem: Heart Failure with preserved Ejection Fraction  - Prior TTE from 2019 with EF 63%, mild MR, + WMA, moderate diastolic dysfunction, normal RV size and function  - proBNP 11/7 529  - Patient appears to be on sHF GDMT. Will repeat TTE to assess for LVEF, WMA and valvular dysfunction  - For now will continue with sHF GDMT: Hydralazine 25 mg PO BID, coreg 12.5 mg PO BID    Problem/Plan -2  Problem:  Coronary Artery Disease  - ECG SR unchanged from previous ECG  - Hx of CAD with hx of remote CABG (in 1990s) and [place] [facility] [place] not recall years  - per OP cardio Dr Macdonald, cath about 4 years ago with diffuse disease not amendable to PCI  - Reports average functional capacity. Chronic stable angina  - c/w coreg 12.5mg PO BID (dose lowered d/t dizziness), atorvastatin 80mg PO daily (in place of rosuvastatin), imdur 60mg PO daily, ranexa 500mg PO BID      Problem/Plan -3  Problem: HTN  - BP currently wnl.    Problem/Plan -4  Problem: HLD  - c/w statin  - Will resume Vascepa upon DC    Problem/Plan -5  Problem: Cardiac Risk Stratification  - Patient does not appear to be in decompensated HF  - No hx of tachy bridget arrhythmias  - No hx of severe AS/MS   - Patient is mod risk for mod risk colonoscopy. No contraindication to proceed   - Discussed with OP cardio Dr Zack Carlton at [facility]- who is in agreement     Problem/Plan -6  Problem: Orthostatic Hypotension  - Coreg dose reduced  - s/p IVF  - Encourage PO intake as tolerated  - Please implement compression stocking  - Ambulate as tolerated with assistance  - Repeat orthos daily      Amanda Brooks, SHIN-NP   Alfredito Younger DO [facility]  Cardiovascular Medicine  [address] [place] [address], [address]  Available through call or text on [employer] TEAM[employer]  Office: [phone]

## 2022-11-29 VITALS
HEART RATE: 58 BPM | RESPIRATION RATE: 18 BRPM | TEMPERATURE: 97 F | DIASTOLIC BLOOD PRESSURE: 66 MMHG | OXYGEN SATURATION: 98 % | SYSTOLIC BLOOD PRESSURE: 124 MMHG

## 2022-11-29 LAB
TSH SERPL-MCNC: 5.12 UIU/ML — HIGH (ref 0.27–4.2)
VIT B12 SERPL-MCNC: 373 PG/ML — SIGNIFICANT CHANGE UP (ref 232–1245)

## 2022-11-29 PROCEDURE — 85027 COMPLETE CBC AUTOMATED: CPT

## 2022-11-29 PROCEDURE — 85730 THROMBOPLASTIN TIME PARTIAL: CPT

## 2022-11-29 PROCEDURE — 83036 HEMOGLOBIN GLYCOSYLATED A1C: CPT

## 2022-11-29 PROCEDURE — 86901 BLOOD TYPING SEROLOGIC RH(D): CPT

## 2022-11-29 PROCEDURE — 99239 HOSP IP/OBS DSCHRG MGMT >30: CPT

## 2022-11-29 PROCEDURE — 84484 ASSAY OF TROPONIN QUANT: CPT

## 2022-11-29 PROCEDURE — 99285 EMERGENCY DEPT VISIT HI MDM: CPT | Mod: 25

## 2022-11-29 PROCEDURE — 80048 BASIC METABOLIC PNL TOTAL CA: CPT

## 2022-11-29 PROCEDURE — 93356 MYOCRD STRAIN IMG SPCKL TRCK: CPT

## 2022-11-29 PROCEDURE — 93306 TTE W/DOPPLER COMPLETE: CPT

## 2022-11-29 PROCEDURE — 0225U NFCT DS DNA&RNA 21 SARSCOV2: CPT

## 2022-11-29 PROCEDURE — 36415 COLL VENOUS BLD VENIPUNCTURE: CPT

## 2022-11-29 PROCEDURE — 86870 RBC ANTIBODY IDENTIFICATION: CPT

## 2022-11-29 PROCEDURE — 86905 BLOOD TYPING RBC ANTIGENS: CPT

## 2022-11-29 PROCEDURE — 80061 LIPID PANEL: CPT

## 2022-11-29 PROCEDURE — 82272 OCCULT BLD FECES 1-3 TESTS: CPT

## 2022-11-29 PROCEDURE — 86880 COOMBS TEST DIRECT: CPT

## 2022-11-29 PROCEDURE — 80053 COMPREHEN METABOLIC PANEL: CPT

## 2022-11-29 PROCEDURE — 82962 GLUCOSE BLOOD TEST: CPT

## 2022-11-29 PROCEDURE — 87637 SARSCOV2&INF A&B&RSV AMP PRB: CPT

## 2022-11-29 PROCEDURE — 84443 ASSAY THYROID STIM HORMONE: CPT

## 2022-11-29 PROCEDURE — 86922 COMPATIBILITY TEST ANTIGLOB: CPT

## 2022-11-29 PROCEDURE — 94640 AIRWAY INHALATION TREATMENT: CPT

## 2022-11-29 PROCEDURE — 85025 COMPLETE CBC W/AUTO DIFF WBC: CPT

## 2022-11-29 PROCEDURE — 86850 RBC ANTIBODY SCREEN: CPT

## 2022-11-29 PROCEDURE — 82607 VITAMIN B-12: CPT

## 2022-11-29 PROCEDURE — 85610 PROTHROMBIN TIME: CPT

## 2022-11-29 PROCEDURE — 86900 BLOOD TYPING SEROLOGIC ABO: CPT

## 2022-11-29 PROCEDURE — 71045 X-RAY EXAM CHEST 1 VIEW: CPT

## 2022-11-29 RX ORDER — AMLODIPINE BESYLATE 2.5 MG/1
1 TABLET ORAL
Qty: 0 | Refills: 0 | DISCHARGE

## 2022-11-29 RX ORDER — CARVEDILOL PHOSPHATE 80 MG/1
1 CAPSULE, EXTENDED RELEASE ORAL
Qty: 60 | Refills: 0
Start: 2022-11-29 | End: 2022-12-28

## 2022-11-29 RX ORDER — PANTOPRAZOLE SODIUM 20 MG/1
1 TABLET, DELAYED RELEASE ORAL
Qty: 60 | Refills: 0
Start: 2022-11-29 | End: 2022-12-28

## 2022-11-29 RX ADMIN — CLOPIDOGREL BISULFATE 75 MILLIGRAM(S): 75 TABLET, FILM COATED ORAL at 12:39

## 2022-11-29 RX ADMIN — SPIRONOLACTONE 25 MILLIGRAM(S): 25 TABLET, FILM COATED ORAL at 05:35

## 2022-11-29 RX ADMIN — DAPAGLIFLOZIN 5 MILLIGRAM(S): 10 TABLET, FILM COATED ORAL at 12:39

## 2022-11-29 RX ADMIN — Medication 1 DROP(S): at 05:40

## 2022-11-29 RX ADMIN — PANTOPRAZOLE SODIUM 40 MILLIGRAM(S): 20 TABLET, DELAYED RELEASE ORAL at 05:36

## 2022-11-29 RX ADMIN — ISOSORBIDE MONONITRATE 60 MILLIGRAM(S): 60 TABLET, EXTENDED RELEASE ORAL at 12:40

## 2022-11-29 RX ADMIN — CARVEDILOL PHOSPHATE 12.5 MILLIGRAM(S): 80 CAPSULE, EXTENDED RELEASE ORAL at 05:35

## 2022-11-29 RX ADMIN — RANOLAZINE 500 MILLIGRAM(S): 500 TABLET, FILM COATED, EXTENDED RELEASE ORAL at 05:35

## 2022-11-29 NOTE — PROGRESS NOTE ADULT - PROBLEM SELECTOR PROBLEM 2
CAD (coronary artery disease)
Acute GI bleeding
CAD (coronary artery disease)
CAD (coronary artery disease)
Acute GI bleeding
Acute GI bleeding

## 2022-11-29 NOTE — PROGRESS NOTE ADULT - PROBLEM SELECTOR PLAN 3
- hx/o CAD s/p CABG, 3vCABG  - c/w GDMT  - c/w plavix
- hx/o CAD s/p CABG, 3vCABG  - c/w GDMT  - will Plavix given stable
- Pt w/ presumed HFrEF as on Carvedilol, Ranolazine, Farxiga, Pioglitazone, spironolactone  - Reviewed last avalb Echo from Oct 2019, norm LVSF, EF 63%  - repeat echo pending  - Cardiac consult for clearance for possible endocopic procedure  for cardiac clearance prior to scope for GIB    O/p Cardio Miguel Macdonald ([phone])
- Pt w/ presumed HFrEF as on Carvedilol, Ranolazine, Farxiga, Pioglitazone, spironolactone  - Reviewed last avalb Echo from Oct 2019, norm LVSF, EF 63%  - repeat echo pending  - Cardiac consult for clearance for possible endocopic procedure  for cardiac clearance prior to scope for GIB    O/p Cardio Miguel Macdonald ([phone])
- Pt w/ presumed HFrEF as on Carvedilol, Ranolazine, Farxiga, Pioglitazone, spironolactone  - Reviewed last avalb Echo from Oct 2019, norm LVSF, EF 63%  - repeat echo (ordered)  - Cardiac consult for clearance for possible endocopic procedure  for cardiac clearance prior to scope for GIB    O/p Cardio Miguel Macdonald ([phone])
- hx/o CAD s/p CABG, 3vCABG  - c/w GDMT  - c/w plavix

## 2022-11-29 NOTE — PROGRESS NOTE ADULT - PROBLEM SELECTOR PLAN 4
- Pt w/ presumed HFrEF as on Carvedilol, Ranolazine, Farxiga, Pioglitazone, spironolactone  - Reviewed last avalb Echo from Oct 2019, norm LVSF, EF 63%  - repeat echo pending  - Cardiac consult for clearance for possible endocopic procedure  for cardiac clearance prior to scope for GIB    O/p Cardio Miguel Macdonald ([phone])
- c/w carvedilol, spironolactone, amlodipine, isosorbide mononitrate
- Pt w/ presumed HFrEF as on Carvedilol, Ranolazine, Farxiga, Pioglitazone, spironolactone  - Reviewed last avalb Echo from Oct 2019, norm LVSF, EF 63%  - repeat echo pending  - CAppreciate cardiology recs
- c/w carvedilol, spironolactone, amlodipine, isosorbide mononitrate
- c/w carvedilol, spironolactone, amlodipine, isosorbide mononitrate
- Pt w/ presumed HFrEF as on Carvedilol, Ranolazine, Farxiga, Pioglitazone, spironolactone  - Reviewed last avalb Echo from Oct 2019, norm LVSF, EF 63%  - repeat echo pending  - CAppreciate cardiology recs

## 2022-11-29 NOTE — PROGRESS NOTE ADULT - SUBJECTIVE AND OBJECTIVE BOX
Patient is a 82y Female     Patient is a 82y old  Female who presents with a chief complaint of BRBPR (28 Nov 2022 18:42)      HPI:  82F PMH CAD s/p 3vCABG, HTN, HLD, kidney stones, CKD, p/w 1.5 mo of difficulty with stooling and episode of BRBPR today and lightheadedness.   Pt has been struggling with trace blood with BMs for ~1.5mo & was seen 11/8 in the ED for same.   No prior colonoscopy/endoscopy for years, Family having trouble getting cardiac clearance for scopes outpatient    Denies CP, SOB, abm, N/V, fever, chills, palpitation  (24 Nov 2022 00:18)      PAST MEDICAL & SURGICAL HISTORY:  Hypertension      Kidney stones      Dyslipidemia      Depression      MRSA infection  nasal abscess      Coronary artery disease  s/p PCI with one stent to LAD 2009, CABG      Myocardial infarct      Cataracts, bilateral      Prediabetes      S/P coronary artery bypass graft x 3  RCA, OM &amp; LAD bypassed (SVG x 2, LIMA x 1)          MEDICATIONS  (STANDING):  artificial tears (preservative free) Ophthalmic Solution 1 Drop(s) Both EYES every 4 hours  atorvastatin 80 milliGRAM(s) Oral at bedtime  bacitracin   Ointment 1 Application(s) Topical two times a day  budesonide  80 MICROgram(s)/formoterol 4.5 MICROgram(s) Inhaler 2 Puff(s) Inhalation two times a day  carvedilol 12.5 milliGRAM(s) Oral every 12 hours  clopidogrel Tablet 75 milliGRAM(s) Oral daily  dapagliflozin 5 milliGRAM(s) Oral daily  influenza  Vaccine (HIGH DOSE) 0.7 milliLiter(s) IntraMuscular once  isosorbide   mononitrate ER Tablet (IMDUR) 60 milliGRAM(s) Oral daily  lactated ringers. 500 milliLiter(s) (50 mL/Hr) IV Continuous <Continuous>  mirtazapine 30 milliGRAM(s) Oral at bedtime  pantoprazole  Injectable 40 milliGRAM(s) IV Push every 12 hours  ranolazine 500 milliGRAM(s) Oral two times a day  spironolactone 25 milliGRAM(s) Oral daily      Allergies    aspirin (Other; Swelling)  latex (Pruritus; Rash)    Intolerances        SOCIAL HISTORY:  Denies ETOh,Smoking,     FAMILY HISTORY:      REVIEW OF SYSTEMS:    CONSTITUTIONAL: No weakness, fevers or chills  EYES/ENT: No visual changes;  No vertigo or throat pain   NECK: No pain or stiffness  RESPIRATORY: No cough, wheezing, hemoptysis; No shortness of breath  CARDIOVASCULAR: No chest pain or palpitations  GASTROINTESTINAL: No abdominal or epigastric pain. No nausea, vomiting, or hematemesis; No diarrhea or constipation. No melena or hematochezia.  GENITOURINARY: No dysuria, frequency or hematuria  NEUROLOGICAL: No numbness or weakness  SKIN: No itching, burning, rashes, or lesions   All other review of systems is negative unless indicated above.    VITAL:  T(C): , Max: 36.5 (11-29-22 @ 03:37)  T(F): , Max: 97.7 (11-29-22 @ 03:37)  HR: 58 (11-29-22 @ 05:31)  BP: 124/66 (11-29-22 @ 05:31)  BP(mean): --  RR: 18 (11-29-22 @ 05:31)  SpO2: 98% (11-29-22 @ 05:31)  Wt(kg): --    I and O's:    11-26 @ 07:01  -  11-27 @ 07:00  --------------------------------------------------------  IN: 538 mL / OUT: 0 mL / NET: 538 mL    11-27 @ 07:01  -  11-28 @ 07:00  --------------------------------------------------------  IN: 900.8 mL / OUT: 0 mL / NET: 900.8 mL    11-28 @ 07:01  -  11-29 @ 05:52  --------------------------------------------------------  IN: 1186.6 mL / OUT: 0 mL / NET: 1186.6 mL          PHYSICAL EXAM:    Constitutional: NAD  HEENT: PERRLA,   Neck: No JVD  Respiratory: CTA B/L  Cardiovascular: S1 and S2  Gastrointestinal: BS+, soft, NT/ND  Extremities: No peripheral edema  Neurological: A/O x 3, no focal deficits  Psychiatric: Normal mood, normal affect  : No Vines  Skin: No rashes  Access: Not applicable  Back: No CVA tenderness    LABS:                RADIOLOGY & ADDITIONAL STUDIES:

## 2022-11-29 NOTE — PROGRESS NOTE ADULT - PROBLEM SELECTOR PLAN 7
Cr 1.87  - Monitor renal function   - Avoid nephrotoxic med
DVT ppx: hold Plavix   Diet; DASH
Cr 1.87, stable  - Monitor renal function   - Avoid nephrotoxic med
Cr 1.87, stable  - Monitor renal function   - Avoid nephrotoxic med
DVT ppx: hold Plavix   Diet; DASH
DVT ppx: hold Plavix   Diet; DASH

## 2022-11-29 NOTE — PROGRESS NOTE ADULT - PROBLEM SELECTOR PLAN 8
DVT ppx: hold Plavix   Diet; DASH

## 2022-11-29 NOTE — PROGRESS NOTE ADULT - PROBLEM SELECTOR PROBLEM 6
Hyperlipidemia
Hyperlipidemia
Stage 4 chronic kidney disease
Hyperlipidemia

## 2022-11-29 NOTE — PROGRESS NOTE ADULT - PROBLEM SELECTOR PLAN 6
- c/w statin, vascepa
Cr 1.87  - Monitor renal function   - Avoid nephrotoxic med
Cr 1.87  - Monitor renal function   - Avoid nephrotoxic med
- c/w statin, vascepa
Cr 1.87  - Monitor renal function   - Avoid nephrotoxic med
- c/w statin, vascepa

## 2022-11-29 NOTE — PROGRESS NOTE ADULT - PROBLEM SELECTOR PLAN 2
- hx/o CAD s/p CABG, 3vCABG  - c/w GDMT  - will Plavix given stable
GI eval appreciated: given stable hemoglobin, outpatient followup
GI eval appreciated: given stable hemoglobin, outpatient followup
p/w BRBPR, reports lightheadedness   - On Plavix will hold  - IV protonix 40mg q12  - GI eval appreciated  - if hgb remains stable patient. I d/w the patient and her son about outpatient colonoscopy. they are agreeable to outpt follow up but wanted to stay for observation in the hospital for one more night with plan for discharge in the morning if she remains stable.  Hgb trend: 11.3 <-- , 10.9 <-- , 11.1 <--
- hx/o CAD s/p CABG, 3vCABG  - c/w GDMT  - Hold Plavix given GIB
- hx/o CAD s/p CABG, 3vCABG  - c/w GDMT  - Hold Plavix given GIB

## 2022-11-29 NOTE — PROGRESS NOTE ADULT - PROBLEM SELECTOR PROBLEM 5
Hyperlipidemia
HTN (hypertension)
Hyperlipidemia
HTN (hypertension)
Hyperlipidemia
HTN (hypertension)

## 2022-11-29 NOTE — PROGRESS NOTE ADULT - SUBJECTIVE AND OBJECTIVE BOX
Patient is a 82y old  Female who presents with a chief complaint of BRBPR (26 Nov 2022 17:50)      SUBJECTIVE / OVERNIGHT EVENTS: No acute events. Dizziness improved. Wants to pursue [facility] as outpatient  ROS: otherwise negative    MEDICATIONS  (STANDING):  amLODIPine   Tablet 5 milliGRAM(s) Oral daily  atorvastatin 80 milliGRAM(s) Oral at bedtime  bacitracin   Ointment 1 Application(s) Topical two times a day  budesonide  80 MICROgram(s)/formoterol 4.5 MICROgram(s) Inhaler 2 Puff(s) Inhalation two times a day  carvedilol 25 milliGRAM(s) Oral every 12 hours  clopidogrel Tablet 75 milliGRAM(s) Oral daily  dapagliflozin 5 milliGRAM(s) Oral daily  influenza  Vaccine (HIGH DOSE) 0.7 milliLiter(s) IntraMuscular once  isosorbide   mononitrate ER Tablet (IMDUR) 60 milliGRAM(s) Oral daily  mirtazapine 30 milliGRAM(s) Oral at bedtime  pantoprazole  Injectable 40 milliGRAM(s) IV Push every 12 hours  ranolazine 500 milliGRAM(s) Oral two times a day  spironolactone 25 milliGRAM(s) Oral daily    MEDICATIONS  (PRN):  acetaminophen     Tablet .. 650 milliGRAM(s) Oral every 6 hours PRN Temp greater or equal to 38C (100.4F), Mild Pain (1 - 3)  aluminum hydroxide/magnesium hydroxide/simethicone Suspension 30 milliLiter(s) Oral every 4 hours PRN Dyspepsia  artificial  tears Solution 1 Drop(s) Both EYES every 4 hours PRN dry eye  melatonin 3 milliGRAM(s) Oral at bedtime PRN Insomnia  ondansetron Injectable 4 milliGRAM(s) IV Push every 8 hours PRN Nausea and/or Vomiting      PHYSICAL EXAM:  T(C): 36.3 (11-29-22 @ 05:31), Max: 36.5 (11-29-22 @ 03:37)  T(F): 97.3 (11-29-22 @ 05:31), Max: 97.7 (11-29-22 @ 03:37)  HR: 58 (11-29-22 @ 05:31) (53 - 62)  BP: 124/66 (11-29-22 @ 05:31) (118/55 - 137/65)  RR: 18 (11-29-22 @ 05:31) (18 - 18)  SpO2: 98% (11-29-22 @ 05:31) (98% - 98%)  Wt(kg): --    CONSTITUTIONAL: NAD, well-developed, well-groomed  EYES: PERRLA; conjunctiva and sclera clear  ENMT: Moist oral mucosa, no pharyngeal injection or exudates  NECK: Supple, no palpable masses; no thyromegaly  RESPIRATORY: Normal respiratory effort; lungs are clear to auscultation bilaterally  CARDIOVASCULAR: Regular rate and rhythm, normal S1 and S2, + murmur, No lower extremity edema  ABDOMEN: Nontender to palpation, normoactive bowel sounds, no rebound/guarding; No hepatosplenomegaly  MUSCULOSKELETAL:  no clubbing or cyanosis of digits; no joint swelling or tenderness to palpation  PSYCH: A+O to person, place, and time; affect appropriate  NEUROLOGY: CN 2-12 are intact and symmetric; no gross sensory deficits   SKIN: No rashes; no palpable lesions      LABS: reviewed                                                     CAPILLARY BLOOD GLUCOSE                                                CAPILLARY BLOOD GLUCOSE      POCT Blood Glucose.: 94 mg/dL (27 Nov 2022 12:23)

## 2022-11-29 NOTE — PROGRESS NOTE ADULT - PROBLEM SELECTOR PROBLEM 3
CAD (coronary artery disease)
Heart failure
Heart failure
CAD (coronary artery disease)
Heart failure
CAD (coronary artery disease)

## 2022-11-29 NOTE — PROGRESS NOTE ADULT - PROBLEM SELECTOR PROBLEM 1
Acute GI bleeding
Acute GI bleeding
Orthostatic hypotension
Acute GI bleeding
Orthostatic hypotension
Orthostatic hypotension

## 2022-11-29 NOTE — PROGRESS NOTE ADULT - PROBLEM SELECTOR PROBLEM 7
Stage 4 chronic kidney disease
Stage 4 chronic kidney disease
Prophylactic measure
Stage 4 chronic kidney disease
Prophylactic measure
Prophylactic measure

## 2022-11-29 NOTE — PROGRESS NOTE ADULT - PROBLEM SELECTOR PLAN 5
- c/w carvedilol, spironolactone, isosorbide mononitrate
- c/w statin, vascepa
- c/w carvedilol, spironolactone, isosorbide mononitrate
- c/w carvedilol, spironolactone, isosorbide mononitrate

## 2022-11-29 NOTE — PROGRESS NOTE ADULT - ASSESSMENT
conservative gi magnemnt  no acute complaints  continue current rx  risk>beneift endoscpi evlatuion  ct negative, possible op capsule
pt states that she recelty retired,  son at bedise, small blood in stool on admissoin  ricardo diverticualar or hemorrhoidal  pt in er 3 x for dyspepsia  on ppi  no malignancy or pathology on ct scan  can have op endosopic evalaution vs conservative magnemnt  son wants her to "stay in the hosptial  availabel to discuss with team   no endosopic evalation over holiday weekend unless emergent.
spoke wiith son  pt with symptoms isnce 2019  I think pt has signficant risk and little benefit for endospic evalation  no mass or lesion  likely hemorhrhoid vs diverticualr bleed  not candidate for colon resection if has cancer  may benefit from conservative magnemnt  conside rtrazozone for bloating which is main issue  h.pylori stool  would recommend op evalaiton
conservative gi magnemnt  no acute gi complaints  continue current regimen
no acute gi compalints  continue current regimen  no acut rx/ds
pt with dyspepsia  ct negatvie  pt seen earlier and somulent  can have op workp  if inpt egd/colon would be next weeks  d/w son,  recommend op follow up
82F PMH CAD s/p 3vCABG, HTN, HLD, kidney stones, CKD, p/w 1.5 mo of difficulty stooling and episode of BRBPR today admitted for management of GIB     

## 2022-11-29 NOTE — PROGRESS NOTE ADULT - NSPROGADDITIONALINFOA_GEN_ALL_CORE
D/W son at bedside
d/c planning
D/W son at bedside  Stable for discharge to home today with outpatient followup with GI next week    Discharge time: 38 minutes    [facility] Division of Hospital Medicine  Irasema Keating MD  Pager (M-F, 8A-5P): [phone]  Other Times:  [phone]

## 2022-11-29 NOTE — PROGRESS NOTE ADULT - PROBLEM SELECTOR PLAN 1
p/w BRBPR, reports lightheadedness   - On Plavix will hold  - IV protonix 40mg q12  - GI eval appreciated  - if hgb remains stable patient maybe able to follow up for an outpatient colonoscopy  Hgb trend: 10.9 <-- , 11.1 <-- , 10.5 <-- , 11.9 <--
Cont gentle hydration  Antihypertensives adjusted  CTH ordered for c/o dizziness  F/u RVP   Ophtho c/s for c/o blurry vision
Improved with gentle hydration  Antihypertensives adjusted  CTH ordered for c/o dizziness, pt to f/u as outpatient  RVP negative  Ophtho c/s appreciated
will give gentle hydration  will stop amlodipine  will decrease coreg to 12.5mg bid
p/w BRBPR, reports lightheadedness   - On Plavix will hold  - IV protonix 40mg q12  - GI eval appreciated  - if hgb remains stable patient. I d/w the patient and her son about outpatient colonoscopy. they are agreeable to outpt follow up but wanted to stay for observation in the hospital for one more night with plan for discharge in the morning if she remains stable.  Hgb trend: 11.3 <-- , 10.9 <-- , 11.1 <-- , 10.5 <-- , 11.9 <--
p/w BRBPR, reports lightheadedness   - Stool occult (+)  - VSS   - Hg 11.9  - Monitor H&H, Transfuse to keep Hg >8 ( hx/o cardiac dz)  - On Plavix will hold  - Gentle hydration w/500ml NS bolus iso cardiac hx  - IV protonix 40mg q12  - No abm pain, fever, leukocytosis, abx not indicated   - GI eval appreciated

## 2022-11-29 NOTE — PROGRESS NOTE ADULT - PROVIDER SPECIALTY LIST ADULT
Cardiology
Gastroenterology
Hospitalist
Cardiology
Cardiology
Gastroenterology
Gastroenterology
Hospitalist
Hospitalist
Cardiology
Gastroenterology
Hospitalist

## 2022-12-21 NOTE — PATIENT PROFILE ADULT - BRADEN SENSORY
Called pharmacy to verify if PA is needed as med is listed under alternative. Per pharmacy a PA is needed or an alternative sent over. Attempted to call pt to discuss PA versus alternative med. Left vm asking pt to call clinic back.     [employer] message sent to patient as well.     Thanks,  Inna RN  [facility]             
PREMARIN 0.3 MG tablet (Discontinued) 30 tablet 0 2/5/2020 4/5/2020     PA is needed for medication. Please go to [email or web address]     KEY: [id number]       Or     Covered alternatives include:    Premarin 0.3 MG Tablets    Alendronate Sodium    Estradiol    Fluoxetine HCL    Paroxetine HCL    Venlafaxine HCL ER         
Patient read mychart. Has not responded to message or called back. Closing encounter until we hear from pt.     Thanks,  SUNDAR Keith  [facility]     
(4) no impairment

## 2022-12-22 LAB
CRP SERPL-MCNC: <3 MG/L
ERYTHROCYTE [SEDIMENTATION RATE] IN BLOOD BY WESTERGREN METHOD: 50 MM/HR
FOLATE SERPL-MCNC: 9.9 NG/ML
T PALLIDUM AB SER QL IA: NEGATIVE
T4 SERPL-MCNC: 7.5 UG/DL
TSH SERPL-ACNC: 3.55 UIU/ML
VIT B12 SERPL-MCNC: 639 PG/ML

## 2022-12-23 LAB
ANA SER IF-ACNC: NEGATIVE
THYROGLOB AB SERPL-ACNC: <20 IU/ML
THYROPEROXIDASE AB SERPL IA-ACNC: 19.4 IU/ML

## 2022-12-27 LAB — VIT B1 SERPL-MCNC: 75.7 NMOL/L

## 2023-01-06 ENCOUNTER — APPOINTMENT (OUTPATIENT)
Dept: OPHTHALMOLOGY | Facility: CLINIC | Age: 83
End: 2023-01-06

## 2023-01-20 ENCOUNTER — APPOINTMENT (OUTPATIENT)
Dept: OPHTHALMOLOGY | Facility: CLINIC | Age: 83
End: 2023-01-20

## 2023-01-26 ENCOUNTER — APPOINTMENT (OUTPATIENT)
Dept: NEUROLOGY | Facility: CLINIC | Age: 83
End: 2023-01-26
Payer: MEDICARE

## 2023-01-26 VITALS
HEART RATE: 61 BPM | SYSTOLIC BLOOD PRESSURE: 148 MMHG | BODY MASS INDEX: 20.42 KG/M2 | WEIGHT: 104 LBS | DIASTOLIC BLOOD PRESSURE: 75 MMHG | HEIGHT: 60 IN

## 2023-01-26 PROCEDURE — 99215 OFFICE O/P EST HI 40 MIN: CPT

## 2023-01-26 NOTE — PHYSICAL EXAM
[General Appearance - Alert] : alert [FreeTextEntry1] : Flat affect [Total Score ___ / 30] : the patient achieved a score of [unfilled] /30 [Cranial Nerves Optic (II)] : visual acuity intact bilaterally,  visual fields full to confrontation, pupils equal round and reactive to light [Cranial Nerves Oculomotor (III)] : extraocular motion intact [Cranial Nerves Vestibulocochlear (VIII)] : hearing was intact bilaterally [Cranial Nerves Accessory (XI - Cranial And Spinal)] : head turning and shoulder shrug symmetric [Motor Tone] : muscle tone was normal in all four extremities [Motor Strength] : muscle strength was normal in all four extremities [Sensation Tactile Decrease] : light touch was intact [Abnormal Walk] : normal gait [Coordination - Dysmetria Impaired Finger-to-Nose Bilateral] : not present [1+] : Patella left 1+

## 2023-01-26 NOTE — DISCUSSION/SUMMARY
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  Patient reveals that she just started retaking mirtazapine 4 days ago.  Patient's blood work shows unremarkable findings with the exception of ESR which was slightly elevated and encephalopathy panel that had clotted.  We will send off both labs and patient was advised that she needs more time for the Remeron to come to effect.  Patient will be referred to geriatric psychiatry for management of her anxiety.  Patient will follow-up with me in 3 months.\par \par I spent the time noted on the day of this patient encounter preparing for, providing and documenting the above E/M service and counseling and educate patient on differential, workup, disease course, and treatment/management. Education was provided to the patient during this encounter. All questions and concerns were answered and addressed in detail. The patient verbalized understanding and agreed to plan. Patient was advised to continue to monitor for neurologic symptoms and to notify my office or go to the nearest emergency room if there are any changes. Any orders/referrals and communications were provided as well. \par Side effects of the above medications were discussed in detail including but not limited to applicable black box warning and teratogenicity as appropriate. \par Patient was advised to bring previous records to my office. \par \par \par

## 2023-01-26 NOTE — HISTORY OF PRESENT ILLNESS
[FreeTextEntry1] : 82-year-old woman who is here for initial consultation of forgetfulness especially with conversations.  Patient has retired from her lifelong work as a nurse and has symptoms of depression.  Patient is currently on mirtazapine 30 mg at night for many years.  Patient was started on vraylar (no history of bipolar) but she had stopped it after a month.  Patient denies any patient's or personality change and she does not get lost in familiar areas.\par \par Patient has family history of Alzheimer's in her mother who started experiencing symptoms in her 70s.\par \par Interval history January 26, 2023: Patient saw the neuropsychologist and has the report pending.  Patient's blood work were unremarkable with exception of the encephalopathy antibody evaluation and ESR which was slightly elevated.  We will repeat those labs.\par \par Patient was on Remeron in the past and was doing well in terms of her anxiety control however she stopped it many months ago and now she has uncontrolled anxiety.  Patient just started the Remeron 4 days ago and she was advised that the medication will take a few weeks to come to effect.  Patient was complaining about insomnia and we went over her sleep hygiene and it seems that she needs to stick to a better sleep schedule i.e. going to sleep at the same time every day.  Patient was also advised to not have screen time 2 hours prior to sleeping.  Patient states that she was not able to follow that recommendation.

## 2023-03-04 ENCOUNTER — EMERGENCY (EMERGENCY)
Facility: HOSPITAL | Age: 83
LOS: 1 days | Discharge: HOME | End: 2023-03-04
Attending: EMERGENCY MEDICINE
Payer: MEDICARE

## 2023-03-04 VITALS
DIASTOLIC BLOOD PRESSURE: 81 MMHG | HEIGHT: 59 IN | WEIGHT: 100.09 LBS | OXYGEN SATURATION: 98 % | RESPIRATION RATE: 20 BRPM | HEART RATE: 52 BPM | SYSTOLIC BLOOD PRESSURE: 164 MMHG | TEMPERATURE: 98 F

## 2023-03-04 LAB
ALBUMIN SERPL ELPH-MCNC: 4.5 G/DL — SIGNIFICANT CHANGE UP (ref 3.3–5)
ALP SERPL-CCNC: 51 U/L — SIGNIFICANT CHANGE UP (ref 40–120)
ALT FLD-CCNC: 9 U/L — LOW (ref 10–45)
ANION GAP SERPL CALC-SCNC: 9 MMOL/L — SIGNIFICANT CHANGE UP (ref 5–17)
AST SERPL-CCNC: 20 U/L — SIGNIFICANT CHANGE UP (ref 10–40)
BASOPHILS # BLD AUTO: 0 K/UL — SIGNIFICANT CHANGE UP (ref 0–0.2)
BASOPHILS NFR BLD AUTO: 0 % — SIGNIFICANT CHANGE UP (ref 0–2)
BILIRUB SERPL-MCNC: 0.3 MG/DL — SIGNIFICANT CHANGE UP (ref 0.2–1.2)
BUN SERPL-MCNC: 16 MG/DL — SIGNIFICANT CHANGE UP (ref 7–23)
CALCIUM SERPL-MCNC: 9.7 MG/DL — SIGNIFICANT CHANGE UP (ref 8.4–10.5)
CHLORIDE SERPL-SCNC: 105 MMOL/L — SIGNIFICANT CHANGE UP (ref 96–108)
CO2 SERPL-SCNC: 24 MMOL/L — SIGNIFICANT CHANGE UP (ref 22–31)
CREAT SERPL-MCNC: 1.38 MG/DL — HIGH (ref 0.5–1.3)
EGFR: 39 ML/MIN/1.73M2 — LOW
EOSINOPHIL # BLD AUTO: 0.03 K/UL — SIGNIFICANT CHANGE UP (ref 0–0.5)
EOSINOPHIL NFR BLD AUTO: 0.9 % — SIGNIFICANT CHANGE UP (ref 0–6)
GLUCOSE SERPL-MCNC: 126 MG/DL — HIGH (ref 70–99)
HCT VFR BLD CALC: 32 % — LOW (ref 34.5–45)
HGB BLD-MCNC: 10.7 G/DL — LOW (ref 11.5–15.5)
LYMPHOCYTES # BLD AUTO: 0.91 K/UL — LOW (ref 1–3.3)
LYMPHOCYTES # BLD AUTO: 26.8 % — SIGNIFICANT CHANGE UP (ref 13–44)
MAGNESIUM SERPL-MCNC: 2.4 MG/DL — SIGNIFICANT CHANGE UP (ref 1.6–2.6)
MANUAL SMEAR VERIFICATION: SIGNIFICANT CHANGE UP
MCHC RBC-ENTMCNC: 31 PG — SIGNIFICANT CHANGE UP (ref 27–34)
MCHC RBC-ENTMCNC: 33.4 GM/DL — SIGNIFICANT CHANGE UP (ref 32–36)
MCV RBC AUTO: 92.8 FL — SIGNIFICANT CHANGE UP (ref 80–100)
MONOCYTES # BLD AUTO: 0.4 K/UL — SIGNIFICANT CHANGE UP (ref 0–0.9)
MONOCYTES NFR BLD AUTO: 11.6 % — SIGNIFICANT CHANGE UP (ref 2–14)
NEUTROPHILS # BLD AUTO: 2.07 K/UL — SIGNIFICANT CHANGE UP (ref 1.8–7.4)
NEUTROPHILS NFR BLD AUTO: 60.7 % — SIGNIFICANT CHANGE UP (ref 43–77)
PHOSPHATE SERPL-MCNC: 3.3 MG/DL — SIGNIFICANT CHANGE UP (ref 2.5–4.5)
PLAT MORPH BLD: NORMAL — SIGNIFICANT CHANGE UP
PLATELET # BLD AUTO: 153 K/UL — SIGNIFICANT CHANGE UP (ref 150–400)
POTASSIUM SERPL-MCNC: 4.5 MMOL/L — SIGNIFICANT CHANGE UP (ref 3.5–5.3)
POTASSIUM SERPL-SCNC: 4.5 MMOL/L — SIGNIFICANT CHANGE UP (ref 3.5–5.3)
PROT SERPL-MCNC: 7.3 G/DL — SIGNIFICANT CHANGE UP (ref 6–8.3)
RBC # BLD: 3.45 M/UL — LOW (ref 3.8–5.2)
RBC # FLD: 15.7 % — HIGH (ref 10.3–14.5)
RBC BLD AUTO: SIGNIFICANT CHANGE UP
SODIUM SERPL-SCNC: 138 MMOL/L — SIGNIFICANT CHANGE UP (ref 135–145)
WBC # BLD: 3.41 K/UL — LOW (ref 3.8–10.5)
WBC # FLD AUTO: 3.41 K/UL — LOW (ref 3.8–10.5)

## 2023-03-04 PROCEDURE — 99283 EMERGENCY DEPT VISIT LOW MDM: CPT

## 2023-03-04 PROCEDURE — 85025 COMPLETE CBC W/AUTO DIFF WBC: CPT

## 2023-03-04 PROCEDURE — 80053 COMPREHEN METABOLIC PANEL: CPT

## 2023-03-04 PROCEDURE — 36415 COLL VENOUS BLD VENIPUNCTURE: CPT

## 2023-03-04 PROCEDURE — 73630 X-RAY EXAM OF FOOT: CPT

## 2023-03-04 PROCEDURE — 83036 HEMOGLOBIN GLYCOSYLATED A1C: CPT

## 2023-03-04 PROCEDURE — 99284 EMERGENCY DEPT VISIT MOD MDM: CPT | Mod: FS

## 2023-03-04 PROCEDURE — 83735 ASSAY OF MAGNESIUM: CPT

## 2023-03-04 PROCEDURE — 84100 ASSAY OF PHOSPHORUS: CPT

## 2023-03-04 PROCEDURE — 73630 X-RAY EXAM OF FOOT: CPT | Mod: 26,50

## 2023-03-04 RX ORDER — CYCLOBENZAPRINE HYDROCHLORIDE 10 MG/1
5 TABLET, FILM COATED ORAL ONCE
Refills: 0 | Status: DISCONTINUED | OUTPATIENT
Start: 2023-03-04 | End: 2023-03-04

## 2023-03-04 RX ORDER — ACETAMINOPHEN 500 MG
650 TABLET ORAL ONCE
Refills: 0 | Status: COMPLETED | OUTPATIENT
Start: 2023-03-04 | End: 2023-03-04

## 2023-03-04 RX ORDER — CYCLOBENZAPRINE HYDROCHLORIDE 10 MG/1
1 TABLET, FILM COATED ORAL
Qty: 7 | Refills: 0
Start: 2023-03-04 | End: 2023-03-10

## 2023-03-04 NOTE — ED PROVIDER NOTE - NSFOLLOWUPINSTRUCTIONS_ED_ALL_ED_FT
Keep continue your current medications as prescribed.    Take Tylenol (2 tablets of 500mg every 8hours) as needed for pain.    Flexeril as prescribed for pain at night.    Follow up with your primary Dr. for reevaluation, call Monday for appointment.    Return for any concerns, fever, vomiting, weakness, or worsening symptoms.

## 2023-03-04 NOTE — ED PROVIDER NOTE - NS ED ATTENDING STATEMENT MOD
This was a shared visit with the JOVI. I reviewed and verified the documentation and independently performed the documented:

## 2023-03-04 NOTE — ED PROVIDER NOTE - CLINICAL SUMMARY MEDICAL DECISION MAKING FREE TEXT BOX
Bilateral lower extremities tingling.  Pain seems to be worse at night.  Symptoms are consistent with neuropathy.  Very unlikely to be peripheral arterial disease because patient's symptoms are not worse with walking or ambulation.  Will check electrolytes and reassess.  Will likely refer back to primary care for this.

## 2023-03-04 NOTE — ED PROVIDER NOTE - OBJECTIVE STATEMENT
Attendin-year-old female presents with bilateral foot tingling for about 1 week.  She states the symptoms are bilateral and equal.  Symptoms are worse at night when she is at rest.  They are not worse with exertion or with walking.  She actually notices it less when she ambulates.  She went to her primary care doctor for this a few days ago and he started her on Neurontin but she has not begun this yet.  Of note patient started taking Lexapro for her anxiety about 1 week ago.  She states she does not believe she is diabetic however she does take Actos.  She is a former smoker but quit over 30 years ago.  There is no fever or chills.  No weakness in the lower extremities.  does not use compression stockings.

## 2023-03-04 NOTE — ED ADULT NURSE NOTE - OBJECTIVE STATEMENT
Patient presents to Ed with c/o b/l feet tingling. Patient reports she has been experiencing her feet tingling which has  worsened and causes her to not sleep at night. Patient also c/o intermittent blurred vision. Patient denies chest pain or   sob no nausea vomiting cough fever chills headache or dizziness, will continue to monitor patient.

## 2023-03-04 NOTE — ED PROVIDER NOTE - PATIENT PORTAL LINK FT
You can access the FollowMyHealth Patient Portal offered by [facility] by registering at the following website: [email or web address]. By joining [employer]’s FollowMyHealth portal, you will also be able to view your health information using other applications (apps) compatible with our system.

## 2023-03-05 LAB
A1C WITH ESTIMATED AVERAGE GLUCOSE RESULT: 5.9 % — HIGH (ref 4–5.6)
ESTIMATED AVERAGE GLUCOSE: 123 MG/DL — HIGH (ref 68–114)

## 2023-03-21 ENCOUNTER — APPOINTMENT (OUTPATIENT)
Dept: GERIATRICS | Facility: CLINIC | Age: 83
End: 2023-03-21

## 2023-03-27 NOTE — ED ADULT NURSE NOTE - CHPI ED NUR SEVERITY2
well developed, well nourished , in no acute distress , ambulating with a cane , normal communication ability 
PAIN SCALE 0 OF 10.

## 2023-04-17 NOTE — DISCHARGE NOTE PROVIDER - CARE PROVIDERS DIRECT ADDRESSES
Continue Regimen: Clindamycin.1% solution as needed. Render In Strict Bullet Format?: No Detail Level: Zone ,DirectAddress_Unknown,[email or web address],DirectAddress_Unknown ,DirectAddress_Unknown,DirectAddress_[email or web address]

## 2023-08-30 NOTE — ED PROVIDER NOTE - TOBACCO USE
Walking - Indoors allowed/No heavy lifting/straining/Walking - Outdoors allowed/Follow Instructions Provided by your Surgical Team
Never smoker

## 2023-12-11 NOTE — ED PROVIDER NOTE - SCRIBE NAME
Subjective:       Patient ID: Delmy Siddiqui is a 30 y.o. female.    Chief Complaint: Follow-up (B/p)    HPI    Delmy Siddiqui is a 30 y.o. female , English speaking, with a history of:  GERD  Anxiety  White coat syndrome        [Local Patient]  Originally from [facility]  Lives in: [place] [address]       Patient comes to the clinic TO FOLLOW-UP ON HER BLOOD PRESSURE.      She was seen at a gynecology visit where she was found to have very elevated blood pressure reading and she was recommended to follow-up with her PCP.    She has been taking her blood pressure at home for the past month almost daily and her blood pressure numbers ranged between 110-120/70-80.    No elevated blood pressure readings at home.    Patient denies cephalalgia, headaches, palpitations or other symptoms.    Anxiety still better, no panic attacks, she is sleeping well.    She continued Lexapro.    She watches her diet, and has a healthy lifestyle.  She exercises almost 5-6 times per week, placed in his every Monday, runs and does resistance training at least once a week.      Since last seen by me:      11/22/23 Anxiety - virtual visit / f/u    Changes in health or medications: No    Specialists visits and recommendations:        H/o ER visits:   NO    H/o Hospitalizations:  NO    H/o falls: None     Life events / lifestyle:   Nothing new      Most recent laboratories reviewed:    Recent Labs   Lab 03/29/23  0749   WBC 5.97   Hemoglobin 14.1   Hematocrit 40.8   MCV 92   Platelets 182       Recent Labs   Lab 03/29/23  0749   Glucose 93   Sodium 140   Potassium 3.9   BUN 11   Creatinine 0.7   Total Bilirubin 0.5   AST 14   ALT 11       Recent Labs   Lab 03/29/23  0749   Hemoglobin A1C 4.6       Recent Labs   Lab 03/29/23  0749   Cholesterol 137   Triglycerides 42   HDL 65   LDL Cholesterol 63.6   Non-HDL Cholesterol 72             Recent Labs   Lab 04/07/23  1128   HIV 1/2 Ag/Ab Non-reactive   Hepatitis C Ab Non-reactive  "        Current medications:    Current Outpatient Medications:     drospirenone, contraceptive, (SLYND) 4 mg (28) Tab, Take 1 tablet (4 mg total) by mouth once daily., Disp: 90 tablet, Rfl: 3    EScitalopram oxalate (LEXAPRO) 10 MG tablet, Take 1 tablet (10 mg total) by mouth once daily., Disp: 90 tablet, Rfl: 1    pantoprazole (PROTONIX) 40 MG tablet, TAKE 1 TABLET(40 MG) BY MOUTH EVERY DAY, Disp: 30 tablet, Rfl: 2    traZODone (DESYREL) 50 MG tablet, Take 1/4 of the tablet every night before bed time., Disp: 5 tablet, Rfl: 0      Healthcare Maintenance:  Colonoscopy:  N/A  EGD 2/28/2023 - WNL  Vaccinations: Pneumonia N/a, Zoster n/a, Tetanus 2014  COVID vaccination: completed x 3  Depression screening: PHQ2 score = 0     Annual visit approx. Month: March ROS 11-point review of systems done. Negative except for detailed in the HPI.        Objective:      BP (!) 143/89   Pulse (!) 129   Ht 5' 3" (1.6 m)   Wt 61.6 kg (135 lb 12.9 oz)   LMP 11/15/2023 (Exact Date)   BMI 24.06 kg/m²     Physical Exam   Physical Exam  Vitals and nursing note reviewed.   Constitutional:       Appearance: Normal appearance.   HENT:      Head: Normocephalic and atraumatic.      Right Ear: Tympanic membrane normal.      Left Ear: Tympanic membrane normal.      Nose: Nose normal.      Mouth/Throat:      Mouth: Mucous membranes are moist.      Pharynx: Oropharynx is clear.   Eyes:      Extraocular Movements: Extraocular movements intact.      Conjunctiva/sclera: Conjunctivae normal.      Pupils: Pupils are equal, round, and reactive to light.   Cardiovascular:      Rate and Rhythm: Normal rate and regular rhythm.      Pulses: Normal pulses.      Heart sounds: Normal heart sounds.   Pulmonary:      Effort: Pulmonary effort is normal.      Breath sounds: Normal breath sounds.   Abdominal:      General: Bowel sounds are normal. There is no distension.      Palpations: Abdomen is soft.      Tenderness: There is no abdominal tenderness. "   Musculoskeletal:         General: Normal range of motion.      Cervical back: Normal range of motion and neck supple.   Skin:     General: Skin is warm.   Neurological:      General: No focal deficit present.      Mental Status: She is alert and oriented to person, place, and time. Mental status is at baseline.   Psychiatric:         Mood and Affect: Mood normal.           Assessment:       1. Elevated blood pressure reading  -     Ambulatory referral/consult to Internal Medicine    2. White coat syndrome with diagnosis of hypertension  Assessment & Plan:  Consistently elevated BP during doctors visits with normal BP at home (at least 2 m follow up)  Will continue to observe  Continue anxiety management      3. Anxiety  Assessment & Plan:  Improved  Back to her baseline, functional, sleeping well  No panic attackes.  Will continue Lexapro (started September 2023).         Plan:       Continue healthy lifestyle  Continue Lexapro    Problem list updated, medications reconciled, education provided  Lifestyle recommendations given  AVS printed, explained, and given to the patient.  RTC in March for AWV, las already ordered.              NO GUEVARA MD, MPH  Internal Medicine  [facility]  [facility]           Sushil Monahan

## 2023-12-12 ENCOUNTER — APPOINTMENT (OUTPATIENT)
Dept: NEUROLOGY | Facility: CLINIC | Age: 83
End: 2023-12-12
Payer: MEDICARE

## 2023-12-12 VITALS
WEIGHT: 109 LBS | DIASTOLIC BLOOD PRESSURE: 64 MMHG | HEIGHT: 60 IN | HEART RATE: 54 BPM | BODY MASS INDEX: 21.4 KG/M2 | SYSTOLIC BLOOD PRESSURE: 115 MMHG

## 2023-12-12 DIAGNOSIS — E78.5 HYPERLIPIDEMIA, UNSPECIFIED: ICD-10-CM

## 2023-12-12 DIAGNOSIS — I25.10 ATHEROSCLEROTIC HEART DISEASE OF NATIVE CORONARY ARTERY W/OUT ANGINA PECTORIS: ICD-10-CM

## 2023-12-12 DIAGNOSIS — R41.3 OTHER AMNESIA: ICD-10-CM

## 2023-12-12 DIAGNOSIS — G62.9 POLYNEUROPATHY, UNSPECIFIED: ICD-10-CM

## 2023-12-12 DIAGNOSIS — I10 ESSENTIAL (PRIMARY) HYPERTENSION: ICD-10-CM

## 2023-12-12 DIAGNOSIS — K21.9 GASTRO-ESOPHAGEAL REFLUX DISEASE W/OUT ESOPHAGITIS: ICD-10-CM

## 2023-12-12 PROCEDURE — 99215 OFFICE O/P EST HI 40 MIN: CPT

## 2023-12-12 RX ORDER — PREGABALIN 25 MG/1
25 CAPSULE ORAL DAILY
Refills: 0 | Status: ACTIVE | COMMUNITY
Start: 2023-12-12

## 2023-12-12 RX ORDER — MIRTAZAPINE 30 MG/1
30 TABLET, FILM COATED ORAL
Refills: 0 | Status: DISCONTINUED | COMMUNITY
End: 2023-12-12

## 2023-12-12 RX ORDER — PANTOPRAZOLE 20 MG/1
20 TABLET, DELAYED RELEASE ORAL
Refills: 0 | Status: ACTIVE | COMMUNITY
Start: 2023-12-12

## 2023-12-12 RX ORDER — SPIRONOLACTONE 25 MG/1
25 TABLET ORAL DAILY
Refills: 0 | Status: ACTIVE | COMMUNITY
Start: 2023-12-12

## 2023-12-12 RX ORDER — CARVEDILOL 12.5 MG/1
12.5 TABLET, FILM COATED ORAL TWICE DAILY
Refills: 0 | Status: ACTIVE | COMMUNITY
Start: 2023-12-12

## 2023-12-12 RX ORDER — AMLODIPINE BESYLATE 5 MG/1
5 TABLET ORAL
Qty: 90 | Refills: 3 | Status: ACTIVE | COMMUNITY
Start: 2023-12-12

## 2023-12-12 RX ORDER — LORAZEPAM 2 MG/1
2 TABLET ORAL DAILY
Qty: 2 | Refills: 0 | Status: ACTIVE | COMMUNITY
Start: 2023-12-12 | End: 1900-01-01

## 2023-12-12 RX ORDER — ROSUVASTATIN CALCIUM 40 MG/1
40 TABLET, FILM COATED ORAL DAILY
Refills: 0 | Status: ACTIVE | COMMUNITY
Start: 2023-12-12

## 2023-12-12 RX ORDER — CLOPIDOGREL 75 MG/1
75 TABLET, FILM COATED ORAL DAILY
Qty: 30 | Refills: 0 | Status: ACTIVE | COMMUNITY
Start: 2023-12-12

## 2023-12-29 ENCOUNTER — OUTPATIENT (OUTPATIENT)
Dept: OUTPATIENT SERVICES | Facility: HOSPITAL | Age: 83
LOS: 1 days | End: 2023-12-29
Payer: MEDICARE

## 2023-12-29 ENCOUNTER — APPOINTMENT (OUTPATIENT)
Dept: MRI IMAGING | Facility: CLINIC | Age: 83
End: 2023-12-29
Payer: MEDICARE

## 2023-12-29 DIAGNOSIS — R41.3 OTHER AMNESIA: ICD-10-CM

## 2023-12-29 PROCEDURE — 70551 MRI BRAIN STEM W/O DYE: CPT | Mod: 26,MH

## 2023-12-29 PROCEDURE — 76377 3D RENDER W/INTRP POSTPROCES: CPT | Mod: 26

## 2023-12-29 PROCEDURE — 70551 MRI BRAIN STEM W/O DYE: CPT

## 2023-12-29 PROCEDURE — 76377 3D RENDER W/INTRP POSTPROCES: CPT

## 2024-01-01 NOTE — CONSULT NOTE ADULT - SUBJECTIVE AND OBJECTIVE BOX
DATE OF SERVICE: 11-24-22 @ 11:10    CHIEF COMPLAINT:Patient is a 82y old  Female who presents with a chief complaint of BRBPR (24 Nov 2022 09:05)      HISTORY OF PRESENT ILLNESS  82F PMH CAD s/p 3vCABG, HTN, HLD, kidney stones, CKD, p/w 1.5 mo of difficulty with stooling and episode of BRBPR today and lightheadedness.   Pt has been struggling with trace blood with BMs for ~1.5mo & was seen 11/8 in the ED for same. No prior colonoscopy/endoscopy for years, Family having trouble getting cardiac clearance for scopes outpatient.        PAST MEDICAL & SURGICAL HISTORY:  Hypertension      Kidney stones      Dyslipidemia      Depression      MRSA infection  nasal abscess      Coronary artery disease  s/p PCI with one stent to LAD 2009, CABG      Myocardial infarct      Cataracts, bilateral      Prediabetes      S/P coronary artery bypass graft x 3  RCA, OM &amp; LAD bypassed (SVG x 2, LIMA x 1)              MEDICATIONS:  amLODIPine   Tablet 5 milliGRAM(s) Oral daily  carvedilol 25 milliGRAM(s) Oral every 12 hours  isosorbide   mononitrate ER Tablet (IMDUR) 60 milliGRAM(s) Oral daily  ranolazine 500 milliGRAM(s) Oral two times a day  spironolactone 25 milliGRAM(s) Oral daily      budesonide  80 MICROgram(s)/formoterol 4.5 MICROgram(s) Inhaler 2 Puff(s) Inhalation two times a day    acetaminophen     Tablet .. 650 milliGRAM(s) Oral every 6 hours PRN  melatonin 3 milliGRAM(s) Oral at bedtime PRN  mirtazapine 30 milliGRAM(s) Oral daily  ondansetron Injectable 4 milliGRAM(s) IV Push every 8 hours PRN    aluminum hydroxide/magnesium hydroxide/simethicone Suspension 30 milliLiter(s) Oral every 4 hours PRN  pantoprazole  Injectable 40 milliGRAM(s) IV Push every 12 hours    atorvastatin 80 milliGRAM(s) Oral at bedtime  dapagliflozin 5 milliGRAM(s) Oral daily    artificial  tears Solution 1 Drop(s) Both EYES every 4 hours PRN  influenza  Vaccine (HIGH DOSE) 0.7 milliLiter(s) IntraMuscular once      FAMILY HISTORY:      Non-contributory    SOCIAL HISTORY:    [ ] Tobacco  [ ] Drugs  [ ] Alcohol    Allergies    aspirin (Other; Swelling)  latex (Pruritus; Rash)    Intolerances    	    REVIEW OF SYSTEMS:  CONSTITUTIONAL: No fever  EYES: No eye pain, visual disturbances, or discharge  ENMT:  No difficulty hearing, tinnitus  NECK: No pain or stiffness  RESPIRATORY: No cough, wheezing,  CARDIOVASCULAR: No chest pain, palpitations, passing out, dizziness, or leg swelling  GASTROINTESTINAL:  No nausea, vomiting, diarrhea or constipation. No melena.  GENITOURINARY: No dysuria, hematuria  NEUROLOGICAL: No stroke like symptoms  SKIN: No burning or lesions   ENDOCRINE: No heat or cold intolerance  MUSCULOSKELETAL: No joint pain or swelling  PSYCHIATRIC: No  anxiety, mood swings  HEME/LYMPH: No bleeding gums  ALLERGY AND IMMUNOLOGIC: No hives or eczema	    All other ROS negative    PHYSICAL EXAM:  T(C): 36.7 (11-24-22 @ 05:20), Max: 36.9 (11-23-22 @ 17:18)  HR: 67 (11-24-22 @ 05:20) (57 - 84)  BP: 146/64 (11-24-22 @ 05:20) (104/57 - 156/79)  RR: 18 (11-24-22 @ 05:20) (17 - 20)  SpO2: 98% (11-24-22 @ 05:20) (98% - 99%)  Wt(kg): --  I&O's Summary      Appearance: Normal	  HEENT:   Normal oral mucosa, EOMI	  Cardiovascular:  S1 S2, No JVD,    Respiratory: Lungs clear to auscultation	  Psychiatry: Alert  Gastrointestinal:  Soft, Non-tender, + BS	  Skin: No rashes   Neurologic: Non-focal  Extremities:  No edema  Vascular: Peripheral pulses palpable    	    	  	  CARDIAC MARKERS:  Labs personally reviewed by me                                  10.5   4.15  )-----------( 161      ( 24 Nov 2022 07:43 )             32.3     11-24    141  |  107  |  18  ----------------------------<  152<H>  3.9   |  22  |  1.80<H>    Ca    9.1      24 Nov 2022 07:43    TPro  6.5  /  Alb  3.8  /  TBili  0.2  /  DBili  x   /  AST  21  /  ALT  9<L>  /  AlkPhos  45  11-24          EKG: Personally reviewed by me - NSR with twi V1-V2  Radiology: Personally reviewed by me -     < from: Xray Chest 1 View AP/PA (11.23.22 @ 18:25) >  The heart size is normal. Small  sternal wire fracture at the third most   superior suture. This was demonstrated on previous radiographs. The   remaining midline sternotomy wires are intact. Cardiomediastinal clips   are visualized.  The lungs are clear. No focal consolidations  There is no pneumothorax or pleural effusion.  Calcific tendinosis right shoulder.    IMPRESSION:  Clear lungs.    < end of copied text >  < from: CT Abdomen and Pelvis No Cont (11.08.22 @ 00:57) >  IMPRESSION:  No colitis or diverticulitis.    No bowel obstruction, free fluid, free air or abscess.    Nonobstructing bilateral renal stones. No hydronephrosis.    Distended urinary bladder.    Please refer to detailed findings otherwise described above.    < end of copied text >  < from: Transthoracic Echocardiogram (10.19.19 @ 08:23) >  EF (Modified Varghese Rule): 63 %Doppler Peak Velocity  (m/sec): AoV=1.9  ------------------------------------------------------------------------  Observations:  Mitral Valve: Posterior mitral annular calcification,  otherwise normal mitral valve. Mild mitral regurgitation.  Mean transmitral valve gradient equals 4 mm Hg, consistent  with mild mitral stenosis. (HRabout 60s bpm)  Aortic Valve/Aorta: Calcified trileaflet aortic valve with  normal opening. Peak transaortic valve gradient equals 14  mm Hg, mean transaortic valve gradient equals 6 mm Hg.  Minimal aortic regurgitation. Peak left ventricular outflow  tract gradient equals 7 mm Hg.  Aortic Root: 3.1 cm.  LVOT diameter: 2 cm.  Left Atrium: Severely dilated left atrium.  LA volume index  = 53 cc/m2.  Left Ventricle: Overall preserved left ventricular systolic  function with mild segmental wall motion abnormalities. The  basal inferior and basal inferoseptal segments are  akinetic. Remaining segments are normal/hyperkinetic. Mild  left ventricular enlargement. Moderate diastolic  dysfunction (Stage II).  Right Heart: Normal right atrium. Normal rightventricular  size and function. Normal tricuspid valve. Mild tricuspid  regurgitation. Normal pulmonic valve. Mild pulmonic  regurgitation.  Pericardium/Pleura: Normal pericardium with no pericardial  effusion.  Hemodynamic: Estimated right atrial pressure is 8 mm Hg.  Estimated right ventricular systolic pressure equals 33 mm  Hg, assuming right atrial pressure equals 8 mm Hg,  consistent with normal pulmonary pressures.  ------------------------------------------------------------------------  Conclusions:  1. Posterior mitral annular calcification, otherwise normal  mitral valve. Mild mitral regurgitation. Mean transmitral  valve gradient equals 4 mm Hg, consistent with mild mitral  stenosis. (HRabout 60s bpm)  2. Calcified trileaflet aortic valve with normal opening.  Minimal aortic regurgitation.  3. Overall preserved left ventricular systolic function  with mild segmental wall motion abnormalities. The basal  inferior and basal inferoseptal segments are akinetic.  Remaining segmentsare normal/hyperkinetic.  4. Moderate diastolic dysfunction (Stage II).  5. Normal right ventricular size and function.  *** No previous Echo exam.      Assessment /Plan:     82F PMH CAD s/p 3vCABG, HTN, HLD, kidney stones, CKD, p/w 1.5 mo of difficulty with stooling and episode of BRBPR today and lightheadedness.   Pt has been struggling with trace blood with BMs for ~1.5mo & was seen 11/8 in the ED for same. No prior colonoscopy/endoscopy for years, Family having trouble getting cardiac clearance for scopes outpatient. Followed by outpatient Cardiologist, Dr. Miguel Macdonald.     Problem/Plan -1  Problem: Heart Failure with preserved Ejection Fraction  - Prior TTE from 2019 with EF 63%, mild MR, + WMA, moderate diastolic dysfunction, normal RV size and function  - proBNP 11/7 529  - Patient appears to be on sHF GDMT. Will repeat TTE to assess for LVEF, WMA and valvular dysfunction  - For now will continue with sHF GDMT: Hydralazine 25 mg PO BID, coreg 25mg PO BID, Farxiga 5mg PO daily, spironolactone 25mg PO daily    Problem/Plan -2  Problem:  Coronary Artery Disease  - ECG   - Hx of CAD s/p CABG  - c/w coreg 25mg PO BID, atorvastatin 80mg PO daily (in place of rosuvastatin), imdur 60mg PO daily, ranexa 500mg PO BID  - Will repeat TTE to assess for WMA    Problem/Plan -3  Problem: HTN  - BP currently wnl.    Problem/Plan -4  Problem: HLD  - c/w statin  - Will resume Vascepa upon DC    Problem/Plan -5  Problem: Cardiac Risk Stratification  - Patient does not appear to be in decompensated HF  - No hx of tachy bridget arrhythmias  - No hx of severe AS/MS   - Patient is mod risk for mod risk colonoscopy. No contraindication to proceed pending review of TTE.    Differential diagnosis and plan of care discussed with patient after the evaluation. Counseling on diet, nutritional counseling, weight management, exercise and medication compliance was done.   Advanced care planning/advanced directives discussed with patient/family. DNR status including forceful chest compressions to attempt to restart the heart, ventilator support/artificial breathing, electric shock, artificial nutrition, health care proxy, Molst form all discussed with pt. Pt wishes to consider. More than fifteen minutes spent on discussing advanced directives.     Amanda Brooks [facility]  Alfredito Younger DO [facility]  Cardiovascular Medicine  [address], [address]  Available for call or text via [employer] TE[employer]  Office [phone]   DATE OF SERVICE: 11-24-22 @ 11:10    CHIEF COMPLAINT:Patient is a 82y old  Female who presents with a chief complaint of BRBPR (24 Nov 2022 09:05)      HISTORY OF PRESENT ILLNESS  82F PMH CAD s/p 3vCABG, HTN, HLD, kidney stones, CKD, p/w 1.5 mo of difficulty with stooling and episode of BRBPR today and lightheadedness.   Pt has been struggling with trace blood with BMs for ~1.5mo & was seen 11/8 in the ED for same. No prior colonoscopy/endoscopy for years, Family having trouble getting cardiac clearance for scopes outpatient.        PAST MEDICAL & SURGICAL HISTORY:  Hypertension      Kidney stones      Dyslipidemia      Depression      MRSA infection  nasal abscess      Coronary artery disease  s/p PCI with one stent to LAD 2009, CABG      Myocardial infarct      Cataracts, bilateral      Prediabetes      S/P coronary artery bypass graft x 3  RCA, OM &amp; LAD bypassed (SVG x 2, LIMA x 1)              MEDICATIONS:  amLODIPine   Tablet 5 milliGRAM(s) Oral daily  carvedilol 25 milliGRAM(s) Oral every 12 hours  isosorbide   mononitrate ER Tablet (IMDUR) 60 milliGRAM(s) Oral daily  ranolazine 500 milliGRAM(s) Oral two times a day  spironolactone 25 milliGRAM(s) Oral daily      budesonide  80 MICROgram(s)/formoterol 4.5 MICROgram(s) Inhaler 2 Puff(s) Inhalation two times a day    acetaminophen     Tablet .. 650 milliGRAM(s) Oral every 6 hours PRN  melatonin 3 milliGRAM(s) Oral at bedtime PRN  mirtazapine 30 milliGRAM(s) Oral daily  ondansetron Injectable 4 milliGRAM(s) IV Push every 8 hours PRN    aluminum hydroxide/magnesium hydroxide/simethicone Suspension 30 milliLiter(s) Oral every 4 hours PRN  pantoprazole  Injectable 40 milliGRAM(s) IV Push every 12 hours    atorvastatin 80 milliGRAM(s) Oral at bedtime  dapagliflozin 5 milliGRAM(s) Oral daily    artificial  tears Solution 1 Drop(s) Both EYES every 4 hours PRN  influenza  Vaccine (HIGH DOSE) 0.7 milliLiter(s) IntraMuscular once      FAMILY HISTORY:      Non-contributory    SOCIAL HISTORY:    [ -] Tobacco- former smoker but quit a long time ago  [ -] Drugs  [ -] Alcohol    Allergies    aspirin (Other; Swelling)  latex (Pruritus; Rash)    Intolerances    	    REVIEW OF SYSTEMS:  CONSTITUTIONAL: No fever  EYES: No eye pain, visual disturbances, or discharge  ENMT:  No difficulty hearing, tinnitus  NECK: No pain or stiffness  RESPIRATORY: No cough, wheezing,  CARDIOVASCULAR: No chest pain, palpitations, passing out, dizziness, or leg swelling  GASTROINTESTINAL:  No nausea, vomiting, diarrhea or constipation. No melena.  GENITOURINARY: No dysuria, hematuria  NEUROLOGICAL: No stroke like symptoms  SKIN: No burning or lesions   ENDOCRINE: No heat or cold intolerance  MUSCULOSKELETAL: No joint pain or swelling  PSYCHIATRIC: No  anxiety, mood swings  HEME/LYMPH: No bleeding gums  ALLERGY AND IMMUNOLOGIC: No hives or eczema	    All other ROS negative    PHYSICAL EXAM:  T(C): 36.7 (11-24-22 @ 05:20), Max: 36.9 (11-23-22 @ 17:18)  HR: 67 (11-24-22 @ 05:20) (57 - 84)  BP: 146/64 (11-24-22 @ 05:20) (104/57 - 156/79)  RR: 18 (11-24-22 @ 05:20) (17 - 20)  SpO2: 98% (11-24-22 @ 05:20) (98% - 99%)  Wt(kg): --  I&O's Summary      Appearance: Normal	  HEENT:   Normal oral mucosa, EOMI	  Cardiovascular:  S1 S2, No JVD,  + murmur  Respiratory: Lungs clear to auscultation	  Psychiatry: Alert  Gastrointestinal:  Soft, Non-tender, + BS	  Skin: No rashes   Neurologic: Non-focal  Extremities:  No edema  Vascular: Peripheral pulses palpable    	    	  	  CARDIAC MARKERS:  Labs personally reviewed by me                                  10.5   4.15  )-----------( 161      ( 24 Nov 2022 07:43 )             32.3     11-24    141  |  107  |  18  ----------------------------<  152<H>  3.9   |  22  |  1.80<H>    Ca    9.1      24 Nov 2022 07:43    TPro  6.5  /  Alb  3.8  /  TBili  0.2  /  DBili  x   /  AST  21  /  ALT  9<L>  /  AlkPhos  45  11-24          EKG: Personally reviewed by me - NSR with twi V1-V2 (unchanged from prior)  Radiology: Personally reviewed by me -     < from: Xray Chest 1 View AP/PA (11.23.22 @ 18:25) >  The heart size is normal. Small  sternal wire fracture at the third most   superior suture. This was demonstrated on previous radiographs. The   remaining midline sternotomy wires are intact. Cardiomediastinal clips   are visualized.  The lungs are clear. No focal consolidations  There is no pneumothorax or pleural effusion.  Calcific tendinosis right shoulder.    IMPRESSION:  Clear lungs.    < end of copied text >  < from: CT Abdomen and Pelvis No Cont (11.08.22 @ 00:57) >  IMPRESSION:  No colitis or diverticulitis.    No bowel obstruction, free fluid, free air or abscess.    Nonobstructing bilateral renal stones. No hydronephrosis.    Distended urinary bladder.    Please refer to detailed findings otherwise described above.    < end of copied text >  < from: Transthoracic Echocardiogram (10.19.19 @ 08:23) >  EF (Modified Varghese Rule): 63 %Doppler Peak Velocity  (m/sec): AoV=1.9  ------------------------------------------------------------------------  Observations:  Mitral Valve: Posterior mitral annular calcification,  otherwise normal mitral valve. Mild mitral regurgitation.  Mean transmitral valve gradient equals 4 mm Hg, consistent  with mild mitral stenosis. (HRabout 60s bpm)  Aortic Valve/Aorta: Calcified trileaflet aortic valve with  normal opening. Peak transaortic valve gradient equals 14  mm Hg, mean transaortic valve gradient equals 6 mm Hg.  Minimal aortic regurgitation. Peak left ventricular outflow  tract gradient equals 7 mm Hg.  Aortic Root: 3.1 cm.  LVOT diameter: 2 cm.  Left Atrium: Severely dilated left atrium.  LA volume index  = 53 cc/m2.  Left Ventricle: Overall preserved left ventricular systolic  function with mild segmental wall motion abnormalities. The  basal inferior and basal inferoseptal segments are  akinetic. Remaining segments are normal/hyperkinetic. Mild  left ventricular enlargement. Moderate diastolic  dysfunction (Stage II).  Right Heart: Normal right atrium. Normal rightventricular  size and function. Normal tricuspid valve. Mild tricuspid  regurgitation. Normal pulmonic valve. Mild pulmonic  regurgitation.  Pericardium/Pleura: Normal pericardium with no pericardial  effusion.  Hemodynamic: Estimated right atrial pressure is 8 mm Hg.  Estimated right ventricular systolic pressure equals 33 mm  Hg, assuming right atrial pressure equals 8 mm Hg,  consistent with normal pulmonary pressures.  ------------------------------------------------------------------------  Conclusions:  1. Posterior mitral annular calcification, otherwise normal  mitral valve. Mild mitral regurgitation. Mean transmitral  valve gradient equals 4 mm Hg, consistent with mild mitral  stenosis. (HRabout 60s bpm)  2. Calcified trileaflet aortic valve with normal opening.  Minimal aortic regurgitation.  3. Overall preserved left ventricular systolic function  with mild segmental wall motion abnormalities. The basal  inferior and basal inferoseptal segments are akinetic.  Remaining segmentsare normal/hyperkinetic.  4. Moderate diastolic dysfunction (Stage II).  5. Normal right ventricular size and function.  *** No previous Echo exam.      Assessment /Plan:     82F PMH CAD s/p 3vCABG, HTN, HLD, kidney stones, CKD, p/w 1.5 mo of difficulty with stooling and episode of BRBPR today and lightheadedness.   Pt has been struggling with trace blood with BMs for ~1.5mo & was seen 11/8 in the ED for same. No prior colonoscopy/endoscopy for years, Family having trouble getting cardiac clearance for scopes outpatient. Followed by outpatient Cardiologist, Dr. Miguel Macdonald.     Problem/Plan -1  Problem: Heart Failure with preserved Ejection Fraction  - Prior TTE from 2019 with EF 63%, mild MR, + WMA, moderate diastolic dysfunction, normal RV size and function  - proBNP 11/7 529  - Patient appears to be on sHF GDMT. Will repeat TTE to assess for LVEF, WMA and valvular dysfunction  - For now will continue with sHF GDMT: Hydralazine 25 mg PO BID, coreg 25mg PO BID, Farxiga 5mg PO daily, spironolactone 25mg PO daily    Problem/Plan -2  Problem:  Coronary Artery Disease  - ECG SR unchanged from previous ECG  - Hx of CAD with hx of remote CABG (in 1990s) and [place] [facility] [place]  - Reports average functional capacity. Denies CP or SOB.   - c/w coreg 25mg PO BID, atorvastatin 80mg PO daily (in place of rosuvastatin), imdur 60mg PO daily, ranexa 500mg PO BID  - Will repeat TTE to assess for WMA    Problem/Plan -3  Problem: HTN  - BP currently wnl.    Problem/Plan -4  Problem: HLD  - c/w statin  - Will resume Vascepa upon DC    Problem/Plan -5  Problem: Cardiac Risk Stratification  - Patient does not appear to be in decompensated HF  - No hx of tachy bridget arrhythmias  - No hx of severe AS/MS   - Patient is mod risk for mod risk colonoscopy. No contraindication to proceed pending review of TTE.    Differential diagnosis and plan of care discussed with patient after the evaluation. Counseling on diet, nutritional counseling, weight management, exercise and medication compliance was done.   Advanced care planning/advanced directives discussed with patient/family. DNR status including forceful chest compressions to attempt to restart the heart, ventilator support/artificial breathing, electric shock, artificial nutrition, health care proxy, Molst form all discussed with pt. Pt wishes to consider. More than fifteen minutes spent on discussing advanced directives.     Amanda Brooks [facility]  Alfredito Younger DO [facility]  Cardiovascular Medicine  [address] [place] [address] Dr, [address]  Available for call or text via [employer]  Office [phone]   DATE OF SERVICE: 11-24-22 @ 11:10    CHIEF COMPLAINT:Patient is a 82y old  Female who presents with a chief complaint of BRBPR (24 Nov 2022 09:05)      HISTORY OF PRESENT ILLNESS  82F PMH CAD s/p 3vCABG, HTN, HLD, kidney stones, CKD, p/w 1.5 mo of difficulty with stooling and episode of BRBPR today and lightheadedness.   Pt has been struggling with trace blood with BMs for ~1.5mo & was seen 11/8 in the ED for same. No prior colonoscopy/endoscopy for years, Family having trouble getting cardiac clearance for scopes outpatient.        PAST MEDICAL & SURGICAL HISTORY:  Hypertension      Kidney stones      Dyslipidemia      Depression      MRSA infection  nasal abscess      Coronary artery disease  s/p PCI with one stent to LAD 2009, CABG      Myocardial infarct      Cataracts, bilateral      Prediabetes      S/P coronary artery bypass graft x 3  RCA, OM &amp; LAD bypassed (SVG x 2, LIMA x 1)              MEDICATIONS:  amLODIPine   Tablet 5 milliGRAM(s) Oral daily  carvedilol 25 milliGRAM(s) Oral every 12 hours  isosorbide   mononitrate ER Tablet (IMDUR) 60 milliGRAM(s) Oral daily  ranolazine 500 milliGRAM(s) Oral two times a day  spironolactone 25 milliGRAM(s) Oral daily      budesonide  80 MICROgram(s)/formoterol 4.5 MICROgram(s) Inhaler 2 Puff(s) Inhalation two times a day    acetaminophen     Tablet .. 650 milliGRAM(s) Oral every 6 hours PRN  melatonin 3 milliGRAM(s) Oral at bedtime PRN  mirtazapine 30 milliGRAM(s) Oral daily  ondansetron Injectable 4 milliGRAM(s) IV Push every 8 hours PRN    aluminum hydroxide/magnesium hydroxide/simethicone Suspension 30 milliLiter(s) Oral every 4 hours PRN  pantoprazole  Injectable 40 milliGRAM(s) IV Push every 12 hours    atorvastatin 80 milliGRAM(s) Oral at bedtime  dapagliflozin 5 milliGRAM(s) Oral daily    artificial  tears Solution 1 Drop(s) Both EYES every 4 hours PRN  influenza  Vaccine (HIGH DOSE) 0.7 milliLiter(s) IntraMuscular once      FAMILY HISTORY:      Non-contributory    SOCIAL HISTORY:    [ -] Tobacco- former smoker but quit a long time ago  [ -] Drugs  [ -] Alcohol    Allergies    aspirin (Other; Swelling)  latex (Pruritus; Rash)    Intolerances    	    REVIEW OF SYSTEMS:  CONSTITUTIONAL: No fever  EYES: No eye pain, visual disturbances, or discharge  ENMT:  No difficulty hearing, tinnitus  NECK: No pain or stiffness  RESPIRATORY: No cough, wheezing,  CARDIOVASCULAR: No chest pain, palpitations, passing out, dizziness, or leg swelling  GASTROINTESTINAL:  No nausea, vomiting, diarrhea or constipation. No melena.  GENITOURINARY: No dysuria, hematuria  NEUROLOGICAL: No stroke like symptoms  SKIN: No burning or lesions   ENDOCRINE: No heat or cold intolerance  MUSCULOSKELETAL: No joint pain or swelling  PSYCHIATRIC: No  anxiety, mood swings  HEME/LYMPH: No bleeding gums  ALLERGY AND IMMUNOLOGIC: No hives or eczema	    All other ROS negative    PHYSICAL EXAM:  T(C): 36.7 (11-24-22 @ 05:20), Max: 36.9 (11-23-22 @ 17:18)  HR: 67 (11-24-22 @ 05:20) (57 - 84)  BP: 146/64 (11-24-22 @ 05:20) (104/57 - 156/79)  RR: 18 (11-24-22 @ 05:20) (17 - 20)  SpO2: 98% (11-24-22 @ 05:20) (98% - 99%)  Wt(kg): --  I&O's Summary      Appearance: Normal	  HEENT:   Normal oral mucosa, EOMI	  Cardiovascular:  S1 S2, No JVD,  + murmur  Respiratory: Lungs clear to auscultation	  Psychiatry: Alert  Gastrointestinal:  Soft, Non-tender, + BS	  Skin: No rashes   Neurologic: Non-focal  Extremities:  No edema  Vascular: Peripheral pulses palpable    	    	  	  CARDIAC MARKERS:  Labs personally reviewed by me                                  10.5   4.15  )-----------( 161      ( 24 Nov 2022 07:43 )             32.3     11-24    141  |  107  |  18  ----------------------------<  152<H>  3.9   |  22  |  1.80<H>    Ca    9.1      24 Nov 2022 07:43    TPro  6.5  /  Alb  3.8  /  TBili  0.2  /  DBili  x   /  AST  21  /  ALT  9<L>  /  AlkPhos  45  11-24          EKG: Personally reviewed by me - NSR with twi V1-V2 (unchanged from prior)  Radiology: Personally reviewed by me -     < from: Xray Chest 1 View AP/PA (11.23.22 @ 18:25) >  The heart size is normal. Small  sternal wire fracture at the third most   superior suture. This was demonstrated on previous radiographs. The   remaining midline sternotomy wires are intact. Cardiomediastinal clips   are visualized.  The lungs are clear. No focal consolidations  There is no pneumothorax or pleural effusion.  Calcific tendinosis right shoulder.    IMPRESSION:  Clear lungs.    < end of copied text >  < from: CT Abdomen and Pelvis No Cont (11.08.22 @ 00:57) >  IMPRESSION:  No colitis or diverticulitis.    No bowel obstruction, free fluid, free air or abscess.    Nonobstructing bilateral renal stones. No hydronephrosis.    Distended urinary bladder.    Please refer to detailed findings otherwise described above.    < end of copied text >  < from: Transthoracic Echocardiogram (10.19.19 @ 08:23) >  EF (Modified Varghese Rule): 63 %Doppler Peak Velocity  (m/sec): AoV=1.9  ------------------------------------------------------------------------  Observations:  Mitral Valve: Posterior mitral annular calcification,  otherwise normal mitral valve. Mild mitral regurgitation.  Mean transmitral valve gradient equals 4 mm Hg, consistent  with mild mitral stenosis. (HRabout 60s bpm)  Aortic Valve/Aorta: Calcified trileaflet aortic valve with  normal opening. Peak transaortic valve gradient equals 14  mm Hg, mean transaortic valve gradient equals 6 mm Hg.  Minimal aortic regurgitation. Peak left ventricular outflow  tract gradient equals 7 mm Hg.  Aortic Root: 3.1 cm.  LVOT diameter: 2 cm.  Left Atrium: Severely dilated left atrium.  LA volume index  = 53 cc/m2.  Left Ventricle: Overall preserved left ventricular systolic  function with mild segmental wall motion abnormalities. The  basal inferior and basal inferoseptal segments are  akinetic. Remaining segments are normal/hyperkinetic. Mild  left ventricular enlargement. Moderate diastolic  dysfunction (Stage II).  Right Heart: Normal right atrium. Normal rightventricular  size and function. Normal tricuspid valve. Mild tricuspid  regurgitation. Normal pulmonic valve. Mild pulmonic  regurgitation.  Pericardium/Pleura: Normal pericardium with no pericardial  effusion.  Hemodynamic: Estimated right atrial pressure is 8 mm Hg.  Estimated right ventricular systolic pressure equals 33 mm  Hg, assuming right atrial pressure equals 8 mm Hg,  consistent with normal pulmonary pressures.  ------------------------------------------------------------------------  Conclusions:  1. Posterior mitral annular calcification, otherwise normal  mitral valve. Mild mitral regurgitation. Mean transmitral  valve gradient equals 4 mm Hg, consistent with mild mitral  stenosis. (HRabout 60s bpm)  2. Calcified trileaflet aortic valve with normal opening.  Minimal aortic regurgitation.  3. Overall preserved left ventricular systolic function  with mild segmental wall motion abnormalities. The basal  inferior and basal inferoseptal segments are akinetic.  Remaining segmentsare normal/hyperkinetic.  4. Moderate diastolic dysfunction (Stage II).  5. Normal right ventricular size and function.  *** No previous Echo exam.      Assessment /Plan:     82F PMH CAD s/p 3vCABG, HTN, HLD, kidney stones, CKD, p/w 1.5 mo of difficulty with stooling and episode of BRBPR today and lightheadedness.   Pt has been struggling with trace blood with BMs for ~1.5mo & was seen 11/8 in the ED for same. No prior colonoscopy/endoscopy for years, Family having trouble getting cardiac clearance for scopes outpatient. Followed by outpatient Cardiologist, Dr. Miguel Macdonald.     Problem/Plan -1  Problem: Heart Failure with preserved Ejection Fraction  - Prior TTE from 2019 with EF 63%, mild MR, + WMA, moderate diastolic dysfunction, normal RV size and function  - proBNP 11/7 529  - Patient appears to be on sHF GDMT. Will repeat TTE to assess for LVEF, WMA and valvular dysfunction  - For now will continue with sHF GDMT: Hydralazine 25 mg PO BID, coreg 25mg PO BID, Farxiga 5mg PO daily, spironolactone 25mg PO daily    Problem/Plan -2  Problem:  Coronary Artery Disease  - ECG SR unchanged from previous ECG  - Hx of CAD with hx of remote CABG (in 1990s) and subsequent PCI at [facility] [place] but could not recall years  - per OP cardio Dr Macdonald, cath anout 4 years ago with diffuse disease not amendable to PCI  - Reports average functional capacity. CHronic stable angina  - c/w coreg 25mg PO BID, atorvastatin 80mg PO daily (in place of rosuvastatin), imdur 60mg PO daily, ranexa 500mg PO BID  - Will repeat TTE to assess for WMA  -     Problem/Plan -3  Problem: HTN  - BP currently wnl.    Problem/Plan -4  Problem: HLD  - c/w statin  - Will resume Vascepa upon DC    Problem/Plan -5  Problem: Cardiac Risk Stratification  - Patient does not appear to be in decompensated HF  - No hx of tachy bridget arrhythmias  - No hx of severe AS/MS   - Patient is mod risk for mod risk colonoscopy. No contraindication to proceed pending review of TTE.      Dr Younger discussed with OP cardio Dr Miguel Macdonald at [facility], pt with known chronic stable angina, managed medically as diffuse disease not amenable to PCI          Differential diagnosis and plan of care discussed with patient after the evaluation. Counseling on diet, nutritional counseling, weight management, exercise and medication compliance was done.   Advanced care planning/advanced directives discussed with patient/family. DNR status including forceful chest compressions to attempt to restart the heart, ventilator support/artificial breathing, electric shock, artificial nutrition, health care proxy, Molst form all discussed with pt. Pt wishes to consider. More than fifteen minutes spent on discussing advanced directives.     Amanda Brooks [facility]  Alfredito Younger DO [facility]  Cardiovascular Medicine  [address], [address]  Available for call or text via [employer]  Office [phone]   Negative

## 2024-01-23 RX ORDER — DULOXETINE HYDROCHLORIDE 20 MG/1
20 CAPSULE, DELAYED RELEASE PELLETS ORAL DAILY
Qty: 30 | Refills: 0 | Status: ACTIVE | COMMUNITY
Start: 2024-01-23 | End: 1900-01-01

## 2024-03-22 NOTE — ED PROVIDER NOTE - CROS ED PSYCH ALL NEG
3/22/2024    TELEHEALTH EVALUATION -- Audio/Visual (During COVID-19 public health emergency)    HPI:    China Kline (: 1994) has requested an audio/video evaluation for the following concern(s):    Patient is here for adhd, she is doing well on current dose, patient said she does feel it is working. Patient said that the last week and half she has had hard time falling and staying sleep. Patient said her periods are off.     Review of Systems   Constitutional:  Negative for diaphoresis, fatigue and fever.   Respiratory:  Negative for cough, shortness of breath and stridor.    All other systems reviewed and are negative.      PHYSICAL EXAMINATION:  [ INSTRUCTIONS:  \"[x]\" Indicates a positive item  \"[]\" Indicates a negative item  -- DELETE ALL ITEMS NOT EXAMINED]  Vital Signs: (As obtained by patient/caregiver or practitioner observation)        2023    12:50 PM 10/6/2021    11:20 AM   Patient-Reported Vitals   Patient-Reported Weight 155 121.2   Patient-Reported Height 5’1 5'1   Patient-Reported Systolic  112 mmHg   Patient-Reported Diastolic  72 mmHg   Patient-Reported Pulse 111 104   Patient-Reported Temperature 98.4 98.1   Patient-Reported SpO2 98 99   Patient-Reported LMP  10/3/21         Constitutional: [x] Appears well-developed and well-nourished [x] No apparent distress      [] Abnormal-   Mental status  [x] Alert and awake  [x] Oriented to person/place/time [x]Able to follow commands      Eyes:  EOM    [x]  Normal  [] Abnormal-  Sclera  []  Normal  [] Abnormal -         Discharge []  None visible  [] Abnormal -    HENT:   [x] Normocephalic, atraumatic.  [] Abnormal   [] Mouth/Throat: Mucous membranes are moist.     External Ears [x] Normal  [] Abnormal-     Neck: [x] No visualized mass     Pulmonary/Chest: [x] Respiratory effort normal.  [x] No visualized signs of difficulty breathing or respiratory distress        [] Abnormal-      Musculoskeletal:   [x] Normal gait with no signs of 
negative...

## 2024-04-18 ENCOUNTER — APPOINTMENT (OUTPATIENT)
Dept: NEUROLOGY | Facility: CLINIC | Age: 84
End: 2024-04-18

## 2024-08-09 NOTE — ED ADULT NURSE NOTE - OBJECTIVE STATEMENT
Pt c/o "1 episode of bright red blood noted in toilet after a loose BM today. No abd pain/n/v/dizziness/CP/weakness. I am also still having difficulties with not being able to eat full meals, feeling bloated with eating. Seen here on 11/8/22 for same bloating complaint." back

## 2024-08-23 NOTE — ED ADULT TRIAGE NOTE - RESPIRATORY RATE (BREATHS/MIN)
08/23/24 0905   Activity/Group Checklist   Group Community meeting  (Practicing grounding techniques)   Attendance Attended   Attendance Duration (min) 31-45   Interactions Interacted appropriately   Affect/Mood Appropriate   Goals Achieved Identified feelings;Discussed coping strategies;Able to listen to others;Able to engage in interactions;Able to self-disclose;Able to recieve feedback;Able to give feedback to another        22

## 2024-10-14 DIAGNOSIS — E11.9 TYPE 2 DIABETES MELLITUS W/OUT COMPLICATIONS: ICD-10-CM

## 2024-10-14 RX ORDER — EMPAGLIFLOZIN 10 MG/1
10 TABLET, FILM COATED ORAL DAILY
Refills: 0 | Status: ACTIVE | COMMUNITY
Start: 2024-10-14

## 2024-10-21 NOTE — ED PROVIDER NOTE - OBJECTIVE STATEMENT
79F w CAD, HLD, HTN, renal stones, pre-diabetes presents for epigastric and RUQ pain. Chart review reveals patient had a cardiac workup last month and was discharged. States she has gas, worse after eating. Denies chest pain, SOB, diarrhea. States she was treated for a presumed PNA w/ abx 10 days for 5 days and now has diarrhea. Visual

## 2024-11-26 ENCOUNTER — NON-APPOINTMENT (OUTPATIENT)
Age: 84
End: 2024-11-26

## 2024-11-26 ENCOUNTER — APPOINTMENT (OUTPATIENT)
Dept: NEUROLOGY | Facility: CLINIC | Age: 84
End: 2024-11-26
Payer: MEDICARE

## 2024-11-26 VITALS
SYSTOLIC BLOOD PRESSURE: 138 MMHG | BODY MASS INDEX: 20.42 KG/M2 | WEIGHT: 104 LBS | DIASTOLIC BLOOD PRESSURE: 66 MMHG | HEART RATE: 58 BPM | HEIGHT: 60 IN

## 2024-11-26 DIAGNOSIS — R41.3 OTHER AMNESIA: ICD-10-CM

## 2024-11-26 DIAGNOSIS — G62.9 POLYNEUROPATHY, UNSPECIFIED: ICD-10-CM

## 2024-11-26 PROCEDURE — G2211 COMPLEX E/M VISIT ADD ON: CPT

## 2024-11-26 PROCEDURE — 99214 OFFICE O/P EST MOD 30 MIN: CPT

## 2024-11-26 RX ORDER — PIOGLITAZONE HYDROCHLORIDE 15 MG/1
15 TABLET ORAL DAILY
Refills: 0 | Status: ACTIVE | COMMUNITY
Start: 2024-11-26

## 2024-11-26 RX ORDER — EZETIMIBE 10 MG/1
10 TABLET ORAL DAILY
Qty: 90 | Refills: 3 | Status: ACTIVE | COMMUNITY
Start: 2024-11-26

## 2025-02-04 DIAGNOSIS — R41.3 OTHER AMNESIA: ICD-10-CM

## 2025-02-04 DIAGNOSIS — G62.9 POLYNEUROPATHY, UNSPECIFIED: ICD-10-CM
